# Patient Record
Sex: FEMALE | Race: BLACK OR AFRICAN AMERICAN | NOT HISPANIC OR LATINO | Employment: FULL TIME
[De-identification: names, ages, dates, MRNs, and addresses within clinical notes are randomized per-mention and may not be internally consistent; named-entity substitution may affect disease eponyms.]

---

## 2024-03-01 ENCOUNTER — TRANSCRIBE ORDERS (OUTPATIENT)
Dept: OTHER | Age: 54
End: 2024-03-01

## 2024-03-01 DIAGNOSIS — N87.9 CERVICAL DYSPLASIA: Primary | ICD-10-CM

## 2024-03-01 DIAGNOSIS — N95.0 POST-MENOPAUSAL BLEEDING: ICD-10-CM

## 2024-03-04 ENCOUNTER — PATIENT OUTREACH (OUTPATIENT)
Dept: ONCOLOGY | Facility: CLINIC | Age: 54
End: 2024-03-04
Payer: COMMERCIAL

## 2024-03-04 NOTE — PROGRESS NOTES
"New Patient Hematology / Oncology Nurse Navigator Note     Referral Date: 3/1/24    Referring provider:   Brianna Salgado MD   [address] [address]   [phone] (Work)   [phone] (Fax)     Referring Clinic/Organization: [facility]      Referred to: GynOn    Requested provider (if applicable): First available - did not specify     Evaluation for : Cervical dysplasia, High concern for cervical cancer (3/1 path still pending)     Clinical History (per Nurse review of records provided):    3/1/24 Office Visit with OBGYN:  \"Follow-Up   - High concern for cervical cancer regardless of results of colposcopy. Very limited evaluation. Vagina full of blood after placement of speculum. Biopsies taken from sites that appeared abnormal. Discussed by concern with patient and her daughter. Gyn-onc referral placed. Aware of expected phone call  - Given appearance of cervix, friable tissue. ?necrotic tissue, ppx abx.   - Would not attempt office procedures, 500 ml EBL. Advised holding NSAIDs for 6-12 hours. Silver nitrate and lugols solution used if patient presents with bleeding. Bleeding precautions discussed  - Probed into smoking cessation. States she'll be ready soon, but not now. Counseled on tobacco cessation clinic \"  1/26/24 PAP/HPV-- BOOKMARKED  2/12/24 Pelvic US:  Impression:   1. Abnormal thickness of the endometrial stripe for a postmenopausal patient measuring 9 mm. Correlation with tissue sampling is recommended.   2. Conspicuous hypoechoic thickening of the cervix with hyperemia on color flow Doppler evaluation. Recommend correlation with cervical cancer screening. -- BOOKMARKED      Clinical Assessment / Barriers to Care (Per Nurse):  Pt lives in [place], [place]    Records Location: Care Everywhere     Records Needed:   Records from [facility] per protocol (including 3/1 path which is still in process)    Additional testing needed prior to consult:   Path from 3/1 colpo still " pending    Referral updates and Plan:   Referral received, chart reviewed, scheduling instructions updated, patient contacted and routed to NPS for urgent scheduling.     Mellisa Reddy, BSN, RN, PHN, OCN  Hematology/Oncology Nurse Navigator  [facility]  [phone]

## 2024-03-11 NOTE — TELEPHONE ENCOUNTER
RECORDS STATUS - ALL OTHER DIAGNOSIS      RECORDS RECEIVED FROM: [facility] [place] [place]   DATE RECEIVED: 3/11   NOTES STATUS DETAILS   OFFICE NOTE from referring provider CE - [facility] [place] [place] Dr. Brianna Salgado: 3/1/24   MEDICATION LIST CE [facility] [place] [place]   LABS     PATHOLOGY REPORTS [facility] [place] [place], report in CE, slides requested 3/11  [employer] 3/1/24: [id number]   ANYTHING RELATED TO DIAGNOSIS Epic/CE 24   IMAGING (NEED IMAGES & REPORT)     ULTRASOUND PACS 24: [facility] [place] [place]

## 2024-03-11 NOTE — PROGRESS NOTES
Path finalized showing:  Final Diagnosis:   A. Endometrium, biopsy - Multiple superficial fragments of squamous epithelium with severe   dysplasia. Essentially no endometrial tissue present for evaluation.   B. Cervix, 12 o'clock, biopsy -   High-grade squamous intraepithelial lesion (NAJMA 3, severe   dysplasia). Sampling includes ectocervix and endocervix. Transformation zone identified.   Negative for dysplasia.   C. Cervix, 1 o'clock, biopsy -   Squamous cell carcinoma, at least in situ, extending to the   resection margin. See Comment.   D. Endocervix, curettage -   High-grade squamous intraepithelial lesion (NAJMA 3, severe dysplasia).     Intake team already working to obtain slides per protocol     Mellisa Reddy, KATHERINEN, RN, PHN, OCN  Hematology/Oncology Nurse NavigFormerly [facility], [facility]  [phone]

## 2024-03-13 NOTE — TELEPHONE ENCOUNTER
Action March 13, 2024 3:05 PM    Action Taken Sl[place] [facility] [place] and taken to 5th floor path lab for review.

## 2024-03-13 NOTE — PROGRESS NOTES
Patient called NPS with questions regarding her upcoming appointment.     Returned call. Discussed what to expect at her appointment tomorrow with Dr. Edward. No further questions or concerns.     Mellisa Reddy, BSN, RN, PHN, OCN  Hematology/Oncology Nurse Navigator  [facility]  [phone]

## 2024-03-14 ENCOUNTER — ONCOLOGY VISIT (OUTPATIENT)
Dept: ONCOLOGY | Facility: CLINIC | Age: 54
End: 2024-03-14
Attending: OBSTETRICS & GYNECOLOGY
Payer: COMMERCIAL

## 2024-03-14 ENCOUNTER — PRE VISIT (OUTPATIENT)
Dept: ONCOLOGY | Facility: CLINIC | Age: 54
End: 2024-03-14
Payer: COMMERCIAL

## 2024-03-14 ENCOUNTER — LAB REQUISITION (OUTPATIENT)
Dept: LAB | Facility: CLINIC | Age: 54
End: 2024-03-14
Payer: COMMERCIAL

## 2024-03-14 ENCOUNTER — LAB (OUTPATIENT)
Dept: LAB | Facility: CLINIC | Age: 54
End: 2024-03-14
Attending: OBSTETRICS & GYNECOLOGY
Payer: COMMERCIAL

## 2024-03-14 VITALS
RESPIRATION RATE: 20 BRPM | TEMPERATURE: 98.3 F | WEIGHT: 186.7 LBS | HEIGHT: 60 IN | BODY MASS INDEX: 36.66 KG/M2 | SYSTOLIC BLOOD PRESSURE: 122 MMHG | DIASTOLIC BLOOD PRESSURE: 85 MMHG | OXYGEN SATURATION: 99 % | HEART RATE: 103 BPM

## 2024-03-14 DIAGNOSIS — N95.0 POST-MENOPAUSAL BLEEDING: ICD-10-CM

## 2024-03-14 DIAGNOSIS — C53.8 MALIGNANT NEOPLASM OF OVERLAPPING SITES OF CERVIX (H): ICD-10-CM

## 2024-03-14 DIAGNOSIS — N87.9 CERVICAL DYSPLASIA: ICD-10-CM

## 2024-03-14 DIAGNOSIS — C53.1 MALIGNANT NEOPLASM OF EXOCERVIX (H): ICD-10-CM

## 2024-03-14 DIAGNOSIS — C53.1 MALIGNANT NEOPLASM OF EXOCERVIX (H): Primary | ICD-10-CM

## 2024-03-14 LAB
ALBUMIN SERPL BCG-MCNC: 3.6 G/DL (ref 3.5–5.2)
ALP SERPL-CCNC: 108 U/L (ref 40–150)
ALT SERPL W P-5'-P-CCNC: 19 U/L (ref 0–50)
ANION GAP SERPL CALCULATED.3IONS-SCNC: 10 MMOL/L (ref 7–15)
AST SERPL W P-5'-P-CCNC: 34 U/L (ref 0–45)
BASOPHILS # BLD AUTO: 0.1 10E3/UL (ref 0–0.2)
BASOPHILS NFR BLD AUTO: 1 %
BILIRUB SERPL-MCNC: 0.4 MG/DL
BUN SERPL-MCNC: 7.7 MG/DL (ref 6–20)
CALCIUM SERPL-MCNC: 9.3 MG/DL (ref 8.6–10)
CHLORIDE SERPL-SCNC: 98 MMOL/L (ref 98–107)
CREAT SERPL-MCNC: 0.62 MG/DL (ref 0.51–0.95)
DEPRECATED HCO3 PLAS-SCNC: 31 MMOL/L (ref 22–29)
EGFRCR SERPLBLD CKD-EPI 2021: >90 ML/MIN/1.73M2
EOSINOPHIL # BLD AUTO: 0.4 10E3/UL (ref 0–0.7)
EOSINOPHIL NFR BLD AUTO: 6 %
ERYTHROCYTE [DISTWIDTH] IN BLOOD BY AUTOMATED COUNT: 19.6 % (ref 10–15)
GLUCOSE SERPL-MCNC: 108 MG/DL (ref 70–99)
HCT VFR BLD AUTO: 35.5 % (ref 35–47)
HGB BLD-MCNC: 11.5 G/DL (ref 11.7–15.7)
IMM GRANULOCYTES # BLD: 0 10E3/UL
IMM GRANULOCYTES NFR BLD: 0 %
LYMPHOCYTES # BLD AUTO: 1.9 10E3/UL (ref 0.8–5.3)
LYMPHOCYTES NFR BLD AUTO: 28 %
MCH RBC QN AUTO: 29.2 PG (ref 26.5–33)
MCHC RBC AUTO-ENTMCNC: 32.4 G/DL (ref 31.5–36.5)
MCV RBC AUTO: 90 FL (ref 78–100)
MONOCYTES # BLD AUTO: 0.5 10E3/UL (ref 0–1.3)
MONOCYTES NFR BLD AUTO: 7 %
NEUTROPHILS # BLD AUTO: 3.9 10E3/UL (ref 1.6–8.3)
NEUTROPHILS NFR BLD AUTO: 58 %
NRBC # BLD AUTO: 0 10E3/UL
NRBC BLD AUTO-RTO: 0 /100
PLATELET # BLD AUTO: 309 10E3/UL (ref 150–450)
POTASSIUM SERPL-SCNC: 2.9 MMOL/L (ref 3.4–5.3)
PROT SERPL-MCNC: 7.8 G/DL (ref 6.4–8.3)
RBC # BLD AUTO: 3.94 10E6/UL (ref 3.8–5.2)
SODIUM SERPL-SCNC: 139 MMOL/L (ref 135–145)
WBC # BLD AUTO: 6.7 10E3/UL (ref 4–11)

## 2024-03-14 PROCEDURE — 88321 CONSLTJ&REPRT SLD PREP ELSWR: CPT | Performed by: PATHOLOGY

## 2024-03-14 PROCEDURE — 99205 OFFICE O/P NEW HI 60 MIN: CPT | Performed by: OBSTETRICS & GYNECOLOGY

## 2024-03-14 PROCEDURE — 36415 COLL VENOUS BLD VENIPUNCTURE: CPT | Performed by: PATHOLOGY

## 2024-03-14 PROCEDURE — 80053 COMPREHEN METABOLIC PANEL: CPT | Performed by: PATHOLOGY

## 2024-03-14 PROCEDURE — 99213 OFFICE O/P EST LOW 20 MIN: CPT | Performed by: OBSTETRICS & GYNECOLOGY

## 2024-03-14 PROCEDURE — 85025 COMPLETE CBC W/AUTO DIFF WBC: CPT | Performed by: PATHOLOGY

## 2024-03-14 RX ORDER — ERGOCALCIFEROL 1.25 MG/1
CAPSULE, LIQUID FILLED ORAL
COMMUNITY

## 2024-03-14 RX ORDER — NAPROXEN 500 MG/1
TABLET ORAL
COMMUNITY
Start: 2022-10-06 | End: 2024-05-09

## 2024-03-14 RX ORDER — CHLORTHALIDONE 25 MG/1
TABLET ORAL
COMMUNITY
Start: 2024-01-23 | End: 2024-05-09

## 2024-03-14 ASSESSMENT — PAIN SCALES - GENERAL: PAINLEVEL: MODERATE PAIN (4)

## 2024-03-14 NOTE — NURSING NOTE
"Oncology Rooming Note    March 14, 2024 1:41 PM   Michelle Mills is a 53 year old female who presents for:    Chief Complaint   Patient presents with    Oncology Clinic Visit     Cervical dysplasia     Initial Vitals: /85 (BP Location: Right arm, Patient Position: Sitting, Cuff Size: Adult Large)   Pulse 103   Temp 98.3  F (36.8  C) (Oral)   Resp 20   Ht 1.52 m (4' 11.84\")   Wt 84.7 kg (186 lb 11.2 oz)   SpO2 99%   BMI 36.65 kg/m   Estimated body mass index is 36.65 kg/m  as calculated from the following:    Height as of this encounter: 1.52 m (4' 11.84\").    Weight as of this encounter: 84.7 kg (186 lb 11.2 oz). Body surface area is 1.89 meters squared.  Moderate Pain (4) Comment: Data Unavailable   No LMP recorded. Patient is postmenopausal.  Allergies reviewed: Yes  Medications reviewed: Yes    Medications: Medication refills not needed today.  [facility] [employer]  [address]    Frailty Screening:   Is the patient here for a new oncology consult visit in cancer care? 1. Yes. Over the past month, have you experienced difficulty or required a caregiver to assist with:   1. Balance, walking or general mobility (including any falls)? YES-arthritis in legs; otherwise no difficulty   2. Completion of self-care tasks such as bathing, dressing, toileting, grooming/hygiene?  NO  3. Concentration or memory that affects your daily life?  NO       Clinical concerns: none    Domonique Gonzalez"

## 2024-03-14 NOTE — LETTER
3/14/2024         RE: Michelle Mills  [address] [facility] [address]        Dear Colleague,    Thank you for referring your patient, Michelle Mills, to the [facility]. Please see a copy of my visit note below.    GYNECOLOGIC  ONCOLOGY CONSULT    Referring provider:    Brianna Salgado MD  [address]  [place] [address]   RE: Michelle Mills  : 1970  HELGA: 3/14/2024    CC: Squamous Cell Carcinoma of Cervix    HPI: Ms Michelle Mills is a 53 year old female who presents for consultation regarding new diagnosis of cervical cancer.    She presents with her daughter today. She works as  and is able to attend appointments in the afternoon. Reports irregular vaginal bleeding for about 6 months or maybe longer, she thinks she went through menopause about 3 years ago. She also noted increasing vaginal discharge. Reports more fatigue. She was treated for vaginal bacterial infection but the discharge continues to be a problem. She has significant vaginal bleeding during the recent cervical biopsy and she is concerned that a pelvic exam will trigger another bleeding episode. Minimal pelvic pain, no back pain or lower extremity edema or pain.    She reported these symptoms to her primary care routine visit on 24 and was then referred [place] [facility] [place] [facility].      Work up to date  24 noted a friable cervix  Pap: ASC-US, hrHPV 16 positive    24 Pelvic US - endometrial stripe 9 mm    3/1/24 Colposcopy - anterior cervix enlarged, cx biopsy taken and endometrial biopsy  Pathology cervix at 1 o'clock squamous cell carcinoma extending to resection margin  ECC: High grade squamous intraepithelial lesion  EMBx: fragments of HSIL    OBGYN history and Health Maintenance:    Last Pap Smear: see above  Last Mammogram: never  Last Colonoscopy:  never         Past Medical History:   Diagnosis Date     HTN (hypertension)        No past surgical history on  "file.       Current Outpatient Medications   Medication Sig Dispense Refill     chlorthalidone (HYGROTON) 25 MG tablet 1 tablet in the morning with food Orally       naproxen (NAPROSYN) 500 MG tablet 1 tablet with food or milk as needed, do not take along with aleve, aspirin, ibuprofen or motrin Orally every 12 hrs for 15 days       vitamin D2 (ERGOCALCIFEROL) 43657 units (1250 mcg) capsule 1 capsule with meals Orally Once weekly for 90 days           Allergies   Allergen Reactions     Codeine      Other Reaction(s): Unknown       Social History:  Social History     Tobacco Use     Smoking status: Every Day     Types: Cigarettes     Smokeless tobacco: Never   Substance Use Topics     Alcohol use: Not on file       Family History:   The patient's family history is notable for no breast or ovarian cancer.  No family history on file.      Physical Exam:     /85 (BP Location: Right arm, Patient Position: Sitting, Cuff Size: Adult Large)   Pulse 103   Temp 98.3  F (36.8  C) (Oral)   Resp 20   Ht 1.52 m (4' 11.84\")   Wt 84.7 kg (186 lb 11.2 oz)   SpO2 99%   BMI 36.65 kg/m    Body mass index is 36.65 kg/m .    General: Alert and oriented, no acute distress  Psych: Mood stable  Cardiovascular: RRR, no murmors  Pulmonary: Lungs clear . Normal respiratory effort  GI: No distention. No masses. No hernia.   Lymph: No enlarge lymph nodes in neck or groin  : Normal external genitalia. 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     She is a smoker    Recently " established a primary health care    Plan:     1.)    Cervical cancer - I have reviewed recent pathology and today's exam which together support a new diagnosis of cervical cancer. Due to the size of the tumor and location, primary surgical management is not recommended.    We discussed the recommendation would include pelvic radiation therapy which includes cisplatin chemotherapy potentiation. Ahead of the visit will obtain pathology review, PET CT scan for staging and referral to radiation oncology for consult.    Will discuss additional support services to help her with transportation, okay to request social work support       2.) Labs and/or tests ordered include:  CBC, CMP, evaluate renal function and risk of anemia.    3.) Vagina discharge is due to the cancer and will improve once treatment starts     4.) Will discuss smoking cessation program in follow up visits    Katiuska Edward M.D., MPH,  F.A.C.O.G.  Professor  Department of Ob/Gyn and Women's Health  Division of Gynecologic Oncology  [facility]  [phone]       Time: total time spent today, 60 , including preparation, review of outside records, face to face counseling, and documentation.

## 2024-03-14 NOTE — PATIENT INSTRUCTIONS
PET scan and follow up with Dr Edward  Rad/Onc new patient consult    Scheduling will reach out and assist with these appointments

## 2024-03-19 LAB
PATH REPORT.COMMENTS IMP SPEC: ABNORMAL
PATH REPORT.COMMENTS IMP SPEC: YES
PATH REPORT.FINAL DX SPEC: ABNORMAL
PATH REPORT.GROSS SPEC: ABNORMAL
PATH REPORT.RELEVANT HX SPEC: ABNORMAL
PATH REPORT.RELEVANT HX SPEC: ABNORMAL
PATH REPORT.SITE OF ORIGIN SPEC: ABNORMAL

## 2024-03-21 ENCOUNTER — PATIENT OUTREACH (OUTPATIENT)
Dept: ONCOLOGY | Facility: CLINIC | Age: 54
End: 2024-03-21
Payer: COMMERCIAL

## 2024-03-21 DIAGNOSIS — C53.1 MALIGNANT NEOPLASM OF EXOCERVIX (H): Primary | ICD-10-CM

## 2024-03-21 NOTE — PROGRESS NOTES
MD reviewed pathology and discussed this with patient     MD reviewed the plan with patient   PET/MRI/RADOnc appt     MD answered all questions and patient agrees to the plan    Patient appreciative of the call     Xiao Boateng RN

## 2024-03-21 NOTE — TELEPHONE ENCOUNTER
RECORDS STATUS - ALL OTHER DIAGNOSIS      RECORDS RECEIVED FROM: [facility] (Please see pre visit 3/14 - Dr. Edward for Previous records gathering)   DATE RECEIVED: 3/21

## 2024-03-22 ENCOUNTER — ANCILLARY PROCEDURE (OUTPATIENT)
Dept: PET IMAGING | Facility: CLINIC | Age: 54
End: 2024-03-22
Attending: OBSTETRICS & GYNECOLOGY
Payer: COMMERCIAL

## 2024-03-22 DIAGNOSIS — C53.8 MALIGNANT NEOPLASM OF OVERLAPPING SITES OF CERVIX (H): ICD-10-CM

## 2024-03-22 LAB — GLUCOSE SERPL-MCNC: 137 MG/DL (ref 70–99)

## 2024-03-22 RX ORDER — IOPAMIDOL 755 MG/ML
50-125 INJECTION, SOLUTION INTRAVASCULAR ONCE
Status: COMPLETED | OUTPATIENT
Start: 2024-03-22 | End: 2024-03-22

## 2024-03-22 RX ORDER — FUROSEMIDE 10 MG/ML
40 INJECTION INTRAMUSCULAR; INTRAVENOUS ONCE
Status: COMPLETED | OUTPATIENT
Start: 2024-03-22 | End: 2024-03-22

## 2024-03-22 RX ADMIN — FUROSEMIDE 40 MG: 10 INJECTION INTRAMUSCULAR; INTRAVENOUS at 08:01

## 2024-03-22 RX ADMIN — IOPAMIDOL 100 ML: 755 INJECTION, SOLUTION INTRAVASCULAR at 08:01

## 2024-03-22 NOTE — DISCHARGE INSTRUCTIONS

## 2024-03-22 NOTE — PROGRESS NOTES
RADIATION ONCOLOGY CONSULTATION  DATE OF VISIT: Mar 26, 2024    Michelle Mills  MRN: [id number]    PROBLEM:    was seen for initial consultation in the Department of Radiation Oncology on 3/22/2024 at the request of Dr. Edward for cervical cancer      HISTORY OF PRESENT ILLNESS:   53F postmenopausal patient presented for Pap ASCUS with HPV 16 positivity, and postmenopausal bleeding. Her last Pap smear over a decade ago showed no abnormalities, and she has no hx of STDs. The patient has vaginal discharge, daily smoker, consumes alcohol x4 beers weekly, and is on HTN medication.    US showed a 9mm endometrial stripe. Upon examination prior to an attempted colposcopy, the perineal area appeared normal but the anterior cervix was enlarged and vascular, with significant bleeding noted upon speculum insertion, leading to an aborted colposcopy due to poor visualization on 3/1/2024. However, biopsies were obtained from the cervix and endocervical curettage and endometrial biopsy were performed, revealing superficial fragments of squamous cell carcinoma, papillary variant in the endometrium, high-grade squamous intraepithelial lesion (NAJMA-3) with glandular involvement at the 12 o'clock position of the cervix, and squamous cell carcinoma in situ at the 1 o'clock position.    MR of the pelvis and a PET CT ordered, PET-CT performed 3/22/2024, indicated a large cervical mass without denilson involvement or distant metastases, classifying the disease as at least FIGO stage IB3 >4cm.     During the interview, the patient was accompanied to the clinic today by her daughter. She initiated the conversation by expressing hesitancy regarding a repeat speculum exam due to significant bleeding and discomfort from prior exams. Consequently, we communicated with her gynecologic oncologist, Dr. Edward, regarding her previous examination. Dr. Edward noted a 5 cm cervical lesion, involvement of the left parametrium, and no vaginal  involvement. The patient reports that she is currently experiencing daily vaginal discharge, no bleeding in the last 2 weeks except during speculum exams, normal urination without urgency or frequency, and normal bowel movements. She has no history of diabetes or recent diarrhea. She denies experiencing fevers, chills, pelvic pain, or abdominal fullness/discomfort. Despite living 10 to 15 minutes away from this location, she expressed a desire for assistance with transportation to and from appointments due to her reliance on ride-sharing services.    We reviewed her medical history. She noted a significant history of cancer in her extended family but no history of cancer in her immediate family. However, she does have a cousin with cervical cancer and a granddaughter with lymphoma. She has had no prior surgeries except for a tubal ligation. Her obstetric history is G7, P7, with no history of hormone replacement therapy. Additionally, she does not have a pacemaker, has not undergone any prior radiation treatments, and does not have any autoimmune diseases. Currently, she is taking vitamin D and blood pressure medications. She has a history of arthritis in her knees, which is not rheumatoid.    CHEMOTHERAPY HISTORY: No     IMPLANTABLE CARDIAC DEVICE: No     MEDICATIONS:  Current Outpatient Medications   Medication Sig Dispense Refill    chlorthalidone (HYGROTON) 25 MG tablet 1 tablet in the morning with food Orally      naproxen (NAPROSYN) 500 MG tablet 1 tablet with food or milk as needed, do not take along with aleve, aspirin, ibuprofen or motrin Orally every 12 hrs for 15 days      vitamin D2 (ERGOCALCIFEROL) 69647 units (1250 mcg) capsule 1 capsule with meals Orally Once weekly for 90 days          ALLERGIES:   Allergies as of 03/26/2024 - Reviewed 03/14/2024   Allergen Reaction Noted    Codeine  03/14/2024     SOCIAL HISTORY:      Social History     Socioeconomic History    Marital status: Single     Spouse name:  Not on file    Number of children: Not on file    Years of education: Not on file    Highest education level: Not on file   Occupational History    Not on file   Tobacco Use    Smoking status: Every Day     Types: Cigarettes    Smokeless tobacco: Never   Substance and Sexual Activity    Alcohol use: Not on file    Drug use: Not on file    Sexual activity: Not on file   Other Topics Concern    Not on file   Social History Narrative    Not on file     Social Determinants of Health     Financial Resource Strain: Not on file   Food Insecurity: Not on file   Transportation Needs: Not on file   Physical Activity: Not on file   Stress: Not on file   Social Connections: Not on file   Interpersonal Safety: Not on file   Housing Stability: Not on file     FAMILY HISTORY:   No family history on file.    REVIEW OF SYMPTOMS:  A full 12-point review of systems was performed. All pertinent positives and negatives are noted in HPI.    FUNCTIONAL STATUS:  ECO    PHYSICAL EXAMINATION:    Wt 86.6 kg (191 lb)   BMI 37.50 kg/m    Exam:  Constitutional: healthy, alert, and no distress  Cardiovascular: WWP  Respiratory: normal WOB  Gastrointestinal: Abdomen soft, non-tender. BS normal. No masses, organomegaly  : Deferred  Musculoskeletal: extremities normal- no gross deformities noted, gait normal, and normal muscle tone  Skin: no suspicious lesions or rashes  Neurologic: Gait normal. Reflexes normal and symmetric. Sensation grossly WNL.  Psychiatric: mentation appears normal and affect normal/bright    IMAGING/PATH:   Imaging: per HPI, personally reviewed and in agreement    Path: per HPI, personally reviewed and in agreement    Labs: per HPI, personally reviewed and in agreement    ASSESSMENT AND PLAN:   Michelle Mills is a 53 year old woman with new diagnosis of squamous cell carcinoma of the cervix, FIGO IB3 but may be upstaged per MRI, PET-CT without evidence of positive nodes or distant metastases.  Her last gynecologic exam  was notable for left parametrial involvement, no vaginal involvement, 5 cm cervical lesion.  As such, we recommend external beam pelvic radiation to a dose of 45 Gy in 25 treatments (180 cGy/Fx)) to the pelvis and regional nodes with a simultaneous integrated boost of 52 Gray to the left parametria, with concurrent weekly cisplatin to be administered by gynecologic oncology.     The risks, benefits, alternatives, and logistics to radiation therapy were discussed in detail. Side effects of radiation include, but are not limited to, fatigue, skin reaction, drop in blood counts that could lead to infection or bleeding, nausea/vomiting, urinary or bowel symptoms, bowel/bladder obstruction or perforation, lymphedema, vaginal stenosis and dryness. She is aware that the side effects of radiation therapy may be severe and permanent, although we expect that such risks would be outweighed by the benefit of treatment. The patient will require brachytherapy boost following EBRT, interval MRI pelvis will be performed 5 weeks into RT to assess interval response. Further details regarding brachytherapy plan pending MRI pelvis, but we anticipate either tandem and ring brachytherapy versus hybrid applicator.     She was given the opportunity to ask questions, which were answered.    We appreciate the opportunity to participate in Ms. Mills's care.  Please call with questions.    Paola Oneal MD MS PGY2   Discussed with Dr. Henry    Physician Attestation  Ms. Mills was seen and examined by me. I agree with the findings and plan of care as documented in the note. Note above by Dr. Oneal was reviewed and edited by me and reflects our mutual findings and plan of care.    The overall time I spent on direct patient care including self review of records, labs, and radiographic studies, as well as, direct face-to-face interaction with the patient and coordination of care with other providers was 65 minutes.    Dede Henry,  MD  Department of Radiation Oncology  [facility]

## 2024-03-26 ENCOUNTER — PRE VISIT (OUTPATIENT)
Dept: RADIATION ONCOLOGY | Facility: CLINIC | Age: 54
End: 2024-03-26
Payer: COMMERCIAL

## 2024-03-26 ENCOUNTER — OFFICE VISIT (OUTPATIENT)
Dept: RADIATION ONCOLOGY | Facility: CLINIC | Age: 54
End: 2024-03-26
Attending: OBSTETRICS & GYNECOLOGY
Payer: COMMERCIAL

## 2024-03-26 VITALS — WEIGHT: 191 LBS | BODY MASS INDEX: 37.5 KG/M2

## 2024-03-26 DIAGNOSIS — C53.8 MALIGNANT NEOPLASM OF OVERLAPPING SITES OF CERVIX (H): ICD-10-CM

## 2024-03-26 PROCEDURE — 99213 OFFICE O/P EST LOW 20 MIN: CPT | Performed by: RADIOLOGY

## 2024-03-26 PROCEDURE — 99205 OFFICE O/P NEW HI 60 MIN: CPT | Mod: GC | Performed by: RADIOLOGY

## 2024-03-26 ASSESSMENT — ENCOUNTER SYMPTOMS
COUGH: 1
SHORTNESS OF BREATH: 1
CARDIOVASCULAR NEGATIVE: 1
EYES NEGATIVE: 1
GASTROINTESTINAL NEGATIVE: 1
PSYCHIATRIC NEGATIVE: 1
NEUROLOGICAL NEGATIVE: 1

## 2024-03-26 NOTE — PROGRESS NOTES
HPI    INITIAL PATIENT ASSESSMENT    Diagnosis: Cancer    Prior radiation therapy: None    Prior chemotherapy: None    Prior hormonal therapy:No    Pain Eval:  Denies    Psychosocial  Living arrangements: Lives with family  Fall Risk: independent   referral needs: Yes: Referral placed    Advanced Directive: No  Implantable Cardiac Device? No    Onset of menarche: 11  LMP: No LMP recorded. Patient is postmenopausal.  Onset of menopause: Now  Abnormal vaginal bleeding/discharge: No      Review of Systems   Constitutional:  Positive for malaise/fatigue.   HENT: Negative.     Eyes: Negative.    Respiratory:  Positive for cough and shortness of breath.    Cardiovascular: Negative.    Gastrointestinal: Negative.    Genitourinary: Negative.    Musculoskeletal:  Positive for joint pain.        Arthritidis   Skin: Negative.    Neurological: Negative.    Endo/Heme/Allergies: Negative.    Psychiatric/Behavioral: Negative.

## 2024-03-26 NOTE — LETTER
3/26/2024         RE: Michelle Mills  [address]  [facility] [address]        Dear Colleague,    Thank you for referring your patient, Michelle Mills, to the [facility] RADIATION ONCOLOGY. Please see a copy of my visit note below.    RADIATION ONCOLOGY CONSULTATION  DATE OF VISIT: Mar 26, 2024    Michelle Mills  MRN: [id number]    PROBLEM:    was seen for initial consultation in the Department of Radiation Oncology on 3/22/2024 at the request of Dr. Edward for cervical cancer      HISTORY OF PRESENT ILLNESS:   53F postmenopausal patient presented for Pap ASCUS with HPV 16 positivity, and postmenopausal bleeding. Her last Pap smear over a decade ago showed no abnormalities, and she has no hx of STDs. The patient has vaginal discharge, daily smoker, consumes alcohol x4 beers weekly, and is on HTN medication.    US showed a 9mm endometrial stripe. Upon examination prior to an attempted colposcopy, the perineal area appeared normal but the anterior cervix was enlarged and vascular, with significant bleeding noted upon speculum insertion, leading to an aborted colposcopy due to poor visualization on 3/1/2024. However, biopsies were obtained from the cervix and endocervical curettage and endometrial biopsy were performed, revealing superficial fragments of squamous cell carcinoma, papillary variant in the endometrium, high-grade squamous intraepithelial lesion (NAJMA-3) with glandular involvement at the 12 o'clock position of the cervix, and squamous cell carcinoma in situ at the 1 o'clock position.    MR of the pelvis and a PET CT ordered, PET-CT performed 3/22/2024, indicated a large cervical mass without denilson involvement or distant metastases, classifying the disease as at least FIGO stage IB3 >4cm.     During the interview, the patient was accompanied to the clinic today by her daughter. She initiated the conversation by expressing hesitancy regarding a repeat speculum exam due to  significant bleeding and discomfort from prior exams. Consequently, we communicated with her gynecologic oncologist, Dr. Edward, regarding her previous examination. Dr. Edward noted a 5 cm cervical lesion, involvement of the left parametrium, and no vaginal involvement. The patient reports that she is currently experiencing daily vaginal discharge, no bleeding in the last 2 weeks except during speculum exams, normal urination without urgency or frequency, and normal bowel movements. She has no history of diabetes or recent diarrhea. She denies experiencing fevers, chills, pelvic pain, or abdominal fullness/discomfort. Despite living 10 to 15 minutes away from this location, she expressed a desire for assistance with transportation to and from appointments due to her reliance on ride-sharing services.    We reviewed her medical history. She noted a significant history of cancer in her extended family but no history of cancer in her immediate family. However, she does have a cousin with cervical cancer and a granddaughter with lymphoma. She has had no prior surgeries except for a tubal ligation. Her obstetric history is G7, P7, with no history of hormone replacement therapy. Additionally, she does not have a pacemaker, has not undergone any prior radiation treatments, and does not have any autoimmune diseases. Currently, she is taking vitamin D and blood pressure medications. She has a history of arthritis in her knees, which is not rheumatoid.    CHEMOTHERAPY HISTORY: No     IMPLANTABLE CARDIAC DEVICE: No     MEDICATIONS:  Current Outpatient Medications   Medication Sig Dispense Refill    chlorthalidone (HYGROTON) 25 MG tablet 1 tablet in the morning with food Orally      naproxen (NAPROSYN) 500 MG tablet 1 tablet with food or milk as needed, do not take along with aleve, aspirin, ibuprofen or motrin Orally every 12 hrs for 15 days      vitamin D2 (ERGOCALCIFEROL) 32305 units (1250 mcg) capsule 1 capsule with meals  Orally Once weekly for 90 days          ALLERGIES:   Allergies as of 2024 - Reviewed 2024   Allergen Reaction Noted    Codeine  2024     SOCIAL HISTORY:      Social History     Socioeconomic History    Marital status: Single     Spouse name: Not on file    Number of children: Not on file    Years of education: Not on file    Highest education level: Not on file   Occupational History    Not on file   Tobacco Use    Smoking status: Every Day     Types: Cigarettes    Smokeless tobacco: Never   Substance and Sexual Activity    Alcohol use: Not on file    Drug use: Not on file    Sexual activity: Not on file   Other Topics Concern    Not on file   Social History Narrative    Not on file     Social Determinants of Health     Financial Resource Strain: Not on file   Food Insecurity: Not on file   Transportation Needs: Not on file   Physical Activity: Not on file   Stress: Not on file   Social Connections: Not on file   Interpersonal Safety: Not on file   Housing Stability: Not on file     FAMILY HISTORY:   No family history on file.    REVIEW OF SYMPTOMS:  A full 12-point review of systems was performed. All pertinent positives and negatives are noted in HPI.    FUNCTIONAL STATUS:  ECO    PHYSICAL EXAMINATION:    Wt 86.6 kg (191 lb)   BMI 37.50 kg/m    Exam:  Constitutional: healthy, alert, and no distress  Cardiovascular: WWP  Respiratory: normal WOB  Gastrointestinal: Abdomen soft, non-tender. BS normal. No masses, organomegaly  : Deferred  Musculoskeletal: extremities normal- no gross deformities noted, gait normal, and normal muscle tone  Skin: no suspicious lesions or rashes  Neurologic: Gait normal. Reflexes normal and symmetric. Sensation grossly WNL.  Psychiatric: mentation appears normal and affect normal/bright    IMAGING/PATH:   Imaging: per HPI, personally reviewed and in agreement    Path: per HPI, personally reviewed and in agreement    Labs: per HPI, personally reviewed and in  agreement    ASSESSMENT AND PLAN:   Michelle Mills is a 53 year old woman with new diagnosis of cervical cancer, FIGO IB3 but may be upstaged per MRI, PET-CT without evidence of positive nodes or distant metastases.  Her last gynecologic exam was notable for left parametrial involvement, no vaginal involvement, 5 cm cervical lesion.  As such, we recommend external beam pelvic radiation to a dose of 45 Gy in 25 treatments (180 cGy/Fx)) to the pelvis and regional nodes with a simultaneous integrated boost of 52 Gray to the left parametria, with concurrent weekly cisplatin to be administered by gynecologic oncology.     The risks, benefits, alternatives, and logistics to radiation therapy were discussed in detail. Side effects of radiation include, but are not limited to, fatigue, skin reaction, drop in blood counts that could lead to infection or bleeding, nausea/vomiting, urinary or bowel symptoms, bowel/bladder obstruction or perforation, lymphedema, vaginal stenosis and dryness. She is aware that the side effects of radiation therapy may be severe and permanent, although we expect that such risks would be outweighed by the benefit of treatment. The patient will require brachytherapy boost following EBRT, interval MRI pelvis will be performed 5 weeks into RT to assess interval response. Further details regarding brachytherapy plan pending MRI pelvis, but we anticipate either tandem and ring brachytherapy versus hybrid applicator.     She was given the opportunity to ask questions, which were answered.    We appreciate the opportunity to participate in Ms. Mills's care.  Please call with questions.    Paola Oneal MD MS PGY2   Discussed with Dr. Henry    Physician Attestation  Ms. Mills was seen and examined by me. I agree with the findings and plan of care as documented in the note. Note above by Dr. Oneal was reviewed and edited by me and reflects our mutual findings and plan of care.    The overall time I  spent on direct patient care including self review of records, labs, and radiographic studies, as well as, direct face-to-face interaction with the patient and coordination of care with other providers was 65 minutes.    Dede Henry MD  Department of Radiation Oncology  [facility]              HPI    INITIAL PATIENT ASSESSMENT    Diagnosis: Cancer    Prior radiation therapy: None    Prior chemotherapy: None    Prior hormonal therapy:No    Pain Eval:  Denies    Psychosocial  Living arrangements: Lives with family  Fall Risk: independent   referral needs: Yes: Referral placed    Advanced Directive: No  Implantable Cardiac Device? No    Onset of menarche: 11  LMP: No LMP recorded. Patient is postmenopausal.  Onset of menopause: Now  Abnormal vaginal bleeding/discharge: No      Review of Systems   Constitutional:  Positive for malaise/fatigue.   HENT: Negative.     Eyes: Negative.    Respiratory:  Positive for cough and shortness of breath.    Cardiovascular: Negative.    Gastrointestinal: Negative.    Genitourinary: Negative.    Musculoskeletal:  Positive for joint pain.        Arthritidis   Skin: Negative.    Neurological: Negative.    Endo/Heme/Allergies: Negative.    Psychiatric/Behavioral: Negative.       Again, thank you for allowing me to participate in the care of your patient.        Sincerely,        Dede Henry MD

## 2024-03-28 ENCOUNTER — PATIENT OUTREACH (OUTPATIENT)
Dept: CARE COORDINATION | Facility: CLINIC | Age: 54
End: 2024-03-28
Payer: COMMERCIAL

## 2024-03-28 ENCOUNTER — HOSPITAL ENCOUNTER (OUTPATIENT)
Dept: MRI IMAGING | Facility: CLINIC | Age: 54
Discharge: HOME | End: 2024-03-28
Attending: RADIOLOGY | Admitting: RADIOLOGY
Payer: COMMERCIAL

## 2024-03-28 DIAGNOSIS — C53.1 MALIGNANT NEOPLASM OF EXOCERVIX (H): ICD-10-CM

## 2024-03-28 PROCEDURE — 255N000002 HC RX 255 OP 636: Performed by: RADIOLOGY

## 2024-03-28 PROCEDURE — 72197 MRI PELVIS W/O & W/DYE: CPT | Mod: 26 | Performed by: STUDENT IN AN ORGANIZED HEALTH CARE EDUCATION/TRAINING PROGRAM

## 2024-03-28 PROCEDURE — 72197 MRI PELVIS W/O & W/DYE: CPT

## 2024-03-28 PROCEDURE — A9585 GADOBUTROL INJECTION: HCPCS | Performed by: RADIOLOGY

## 2024-03-28 RX ORDER — GADOBUTROL 604.72 MG/ML
10 INJECTION INTRAVENOUS ONCE
Status: COMPLETED | OUTPATIENT
Start: 2024-03-28 | End: 2024-03-28

## 2024-03-28 RX ADMIN — GADOBUTROL 10 ML: 604.72 INJECTION INTRAVENOUS at 13:09

## 2024-03-28 NOTE — PROGRESS NOTES
Social Work - Telephone/Scienionhart message  [facility]  Data:   Patient Name: Michelle Mills  Goes By: Michelle    /Age: 1970 (53 year old)      Referral Source: [facility]     Reason for Referral: Transportation    Intervention: Left voice message for patient on 2024 .   Plan:  will await patient's return phone call/message and provide assistance at that time.      CARLITO Bianchi,SW  Hematology/Oncology Social Worker  Phone:[phone] Pager: [phone]

## 2024-04-03 NOTE — PROGRESS NOTES
GYNECOLOGIC  ONCOLOGY CONSULT    Referring provider:    Brianna Salgado MD  [address] [address]   RE: Michelle Mills  : 1970  HELGA: 3/14/2024    CC: Squamous Cell Carcinoma of Cervix    HPI: Ms Michelle Mills is a 53 year old female who presents for consultation regarding new diagnosis of cervical cancer.    She presents with her daughter today. She works as  and is able to attend appointments in the afternoon. Reports irregular vaginal bleeding for about 6 months or maybe longer, she thinks she went through menopause about 3 years ago. She also noted increasing vaginal discharge. Reports more fatigue. She was treated for vaginal bacterial infection but the discharge continues to be a problem. She has significant vaginal bleeding during the recent cervical biopsy and she is concerned that a pelvic exam will trigger another bleeding episode. Minimal pelvic pain, no back pain or lower extremity edema or pain.    She reported these symptoms to her primary care routine visit on 24 and was then [place] [facility]      Work up to date  24 noted a friable cervix  Pap: ASC-US, hrHPV 16 positive    24 Pelvic US - endometrial stripe 9 mm    3/1/24 Colposcopy - anterior cervix enlarged, cx biopsy taken and endometrial biopsy  Pathology cervix at 1 o'clock squamous cell carcinoma extending to resection margin  ECC: High grade squamous intraepithelial lesion  EMBx: fragments of HSIL    OBGYN history and Health Maintenance:    Last Pap Smear: see above  Last Mammogram: never  Last Colonoscopy:  never         Past Medical History:   Diagnosis Date    HTN (hypertension)        No past surgical history on file.       Current Outpatient Medications   Medication Sig Dispense Refill    chlorthalidone (HYGROTON) 25 MG tablet 1 tablet in the morning with food Orally      naproxen (NAPROSYN) 500 MG tablet 1 tablet with food or milk as needed, do not take along with  "aleve, aspirin, ibuprofen or motrin Orally every 12 hrs for 15 days      vitamin D2 (ERGOCALCIFEROL) 85398 units (1250 mcg) capsule 1 capsule with meals Orally Once weekly for 90 days           Allergies   Allergen Reactions    Codeine      Other Reaction(s): Unknown       Social History:  Social History     Tobacco Use    Smoking status: Every Day     Types: Cigarettes    Smokeless tobacco: Never   Substance Use Topics    Alcohol use: Not on file       Family History:   The patient's family history is notable for no breast or ovarian cancer.  No family history on file.      Physical Exam:     /85 (BP Location: Right arm, Patient Position: Sitting, Cuff Size: Adult Large)   Pulse 103   Temp 98.3  F (36.8  C) (Oral)   Resp 20   Ht 1.52 m (4' 11.84\")   Wt 84.7 kg (186 lb 11.2 oz)   SpO2 99%   BMI 36.65 kg/m    Body mass index is 36.65 kg/m .    General: Alert and oriented, no acute distress  Psych: Mood stable  Cardiovascular: RRR, no murmors  Pulmonary: Lungs clear . Normal respiratory effort  GI: No distention. No masses. No hernia.   Lymph: No enlarge lymph nodes in neck or groin  : Normal external genitalia. Minimal blood in vain, vagina without lesions, Cervix is enlarged with erosive lesion anterior and nodular, lesion appears to be extending into the endocervical canal, on bimanual exam cervix measuring at 6 cm, deviated towards left, with possible left parametrial extension, no pelvic side wall extension  Minimal bleeding during exam, noted malodorous vaginal discharge    Assessment:samantha Mills is a 53 year old woman with a new diagnosis of likely Stage IIB squamous cell carcinoma of the cervix.     She is a smoker    Recently established a primary health care    Plan:     1.)    Cervical cancer - I have reviewed recent pathology and today's exam which together support a new diagnosis of cervical cancer. Due to the size of the tumor and location, primary surgical management is not " recommended.    We discussed the recommendation would include pelvic radiation therapy which includes cisplatin chemotherapy potentiation. Ahead of the visit will obtain pathology review, PET CT scan for staging and referral to radiation oncology for consult.    Will discuss additional support services to help her with transportation, okay to request social work support       2.) Labs and/or tests ordered include:  CBC, CMP, evaluate renal function and risk of anemia.    3.) Vagina discharge is due to the cancer and will improve once treatment starts     4.) Will discuss smoking cessation program in follow up visits    Katiuska Edward M.D., MPH,  F.A.C.O.G.  Professor  Department of Ob/Gyn and Women's Health  Division of Gynecologic Oncology  [facility]  [phone]       Time: total time spent today, 60 , including preparation, review of outside records, face to face counseling, and documentation.

## 2024-04-10 ENCOUNTER — OFFICE VISIT (OUTPATIENT)
Dept: RADIATION ONCOLOGY | Facility: CLINIC | Age: 54
End: 2024-04-10
Attending: RADIOLOGY
Payer: COMMERCIAL

## 2024-04-10 ENCOUNTER — HOSPITAL ENCOUNTER (OUTPATIENT)
Dept: CT IMAGING | Facility: CLINIC | Age: 54
Discharge: HOME | End: 2024-04-10
Attending: RADIOLOGY | Admitting: RADIOLOGY
Payer: COMMERCIAL

## 2024-04-10 ENCOUNTER — OFFICE VISIT (OUTPATIENT)
Dept: RADIATION ONCOLOGY | Facility: CLINIC | Age: 54
End: 2024-04-10
Attending: OBSTETRICS & GYNECOLOGY
Payer: COMMERCIAL

## 2024-04-10 DIAGNOSIS — C53.8 MALIGNANT NEOPLASM OF OVERLAPPING SITES OF CERVIX (H): ICD-10-CM

## 2024-04-10 DIAGNOSIS — C53.1 MALIGNANT NEOPLASM OF EXOCERVIX (H): Primary | ICD-10-CM

## 2024-04-10 PROCEDURE — 77014 CT THERAPY CORRELATE: CPT | Mod: TC

## 2024-04-10 PROCEDURE — 250N000011 HC RX IP 250 OP 636: Performed by: RADIOLOGY

## 2024-04-10 PROCEDURE — 77334 RADIATION TREATMENT AID(S): CPT | Performed by: RADIOLOGY

## 2024-04-10 PROCEDURE — 77334 RADIATION TREATMENT AID(S): CPT | Mod: 26 | Performed by: RADIOLOGY

## 2024-04-10 RX ORDER — IOPAMIDOL 755 MG/ML
118 INJECTION, SOLUTION INTRAVASCULAR ONCE
Status: COMPLETED | OUTPATIENT
Start: 2024-04-10 | End: 2024-04-10

## 2024-04-10 RX ADMIN — IOPAMIDOL 118 ML: 755 INJECTION, SOLUTION INTRAVENOUS at 15:10

## 2024-04-10 NOTE — LETTER
4/10/2024         RE: Michelle Mills  [address]  [facility] [address]        Dear Colleague,    Thank you for referring your patient, Michelle Mills, to the [facility] RADIATION ONCOLOGY. Please see a copy of my visit note below.    A radiation therapy treatment planning simulation was performed.  Please see the [facility] [employer] record for documentation.    Dede Henry MD  Radiation Oncology       Again, thank you for allowing me to participate in the care of your patient.        Sincerely,        Dede Henry MD

## 2024-04-10 NOTE — LETTER
4/10/2024         RE: Michelle Mills  [address]  [facility] [address]        Dear Colleague,    Thank you for referring your patient, Michelle Mills, to the [facility] RADIATION ONCOLOGY. Please see a copy of my visit note below.    A radiation therapy treatment planning simulation was performed.  Please see the [facility] [employer] record for documentation.     Dede Henry MD  Radiation Oncology         Again, thank you for allowing me to participate in the care of your patient.        Sincerely,        Dede Henry MD

## 2024-04-10 NOTE — PROGRESS NOTES
A radiation therapy treatment planning simulation was performed.  Please see the [facility] record for documentation.     Dede Henry MD  Radiation Oncology

## 2024-04-10 NOTE — PROGRESS NOTES
A radiation therapy treatment planning simulation was performed.  Please see the [employer] [employer] record for documentation.    Dede Henry MD  Radiation Oncology

## 2024-04-11 ENCOUNTER — PREP FOR PROCEDURE (OUTPATIENT)
Dept: RADIATION ONCOLOGY | Facility: CLINIC | Age: 54
End: 2024-04-11

## 2024-04-11 ENCOUNTER — PATIENT OUTREACH (OUTPATIENT)
Dept: ONCOLOGY | Facility: CLINIC | Age: 54
End: 2024-04-11
Payer: COMMERCIAL

## 2024-04-11 ENCOUNTER — ONCOLOGY VISIT (OUTPATIENT)
Dept: ONCOLOGY | Facility: CLINIC | Age: 54
End: 2024-04-11
Attending: OBSTETRICS & GYNECOLOGY
Payer: COMMERCIAL

## 2024-04-11 VITALS
SYSTOLIC BLOOD PRESSURE: 120 MMHG | RESPIRATION RATE: 18 BRPM | DIASTOLIC BLOOD PRESSURE: 79 MMHG | WEIGHT: 192 LBS | OXYGEN SATURATION: 98 % | TEMPERATURE: 98.1 F | BODY MASS INDEX: 37.69 KG/M2 | HEART RATE: 99 BPM

## 2024-04-11 DIAGNOSIS — Z51.11 ENCOUNTER FOR ANTINEOPLASTIC CHEMOTHERAPY: ICD-10-CM

## 2024-04-11 DIAGNOSIS — C53.1 MALIGNANT NEOPLASM OF EXOCERVIX (H): Primary | ICD-10-CM

## 2024-04-11 DIAGNOSIS — C53.8 MALIGNANT NEOPLASM OF OVERLAPPING SITES OF CERVIX (H): Primary | ICD-10-CM

## 2024-04-11 DIAGNOSIS — I10 BENIGN ESSENTIAL HYPERTENSION: ICD-10-CM

## 2024-04-11 PROCEDURE — 99213 OFFICE O/P EST LOW 20 MIN: CPT | Performed by: OBSTETRICS & GYNECOLOGY

## 2024-04-11 PROCEDURE — 99214 OFFICE O/P EST MOD 30 MIN: CPT | Performed by: OBSTETRICS & GYNECOLOGY

## 2024-04-11 RX ORDER — HEPARIN SODIUM,PORCINE 10 UNIT/ML
5-20 VIAL (ML) INTRAVENOUS DAILY PRN
Status: CANCELLED | OUTPATIENT
Start: 2024-05-20

## 2024-04-11 RX ORDER — EPINEPHRINE 1 MG/ML
0.3 INJECTION, SOLUTION INTRAMUSCULAR; SUBCUTANEOUS EVERY 5 MIN PRN
OUTPATIENT
Start: 2024-05-27

## 2024-04-11 RX ORDER — DIPHENHYDRAMINE HYDROCHLORIDE 50 MG/ML
50 INJECTION INTRAMUSCULAR; INTRAVENOUS
Status: CANCELLED
Start: 2024-05-06

## 2024-04-11 RX ORDER — METHYLPREDNISOLONE SODIUM SUCCINATE 125 MG/2ML
125 INJECTION, POWDER, LYOPHILIZED, FOR SOLUTION INTRAMUSCULAR; INTRAVENOUS
Status: CANCELLED
Start: 2024-05-13

## 2024-04-11 RX ORDER — DIPHENHYDRAMINE HYDROCHLORIDE 50 MG/ML
50 INJECTION INTRAMUSCULAR; INTRAVENOUS
Status: CANCELLED
Start: 2024-04-29

## 2024-04-11 RX ORDER — ALBUTEROL SULFATE 90 UG/1
1-2 AEROSOL, METERED RESPIRATORY (INHALATION)
Start: 2024-05-27

## 2024-04-11 RX ORDER — LORAZEPAM 2 MG/ML
0.5 INJECTION INTRAMUSCULAR EVERY 4 HOURS PRN
OUTPATIENT
Start: 2024-05-27

## 2024-04-11 RX ORDER — METHYLPREDNISOLONE SODIUM SUCCINATE 125 MG/2ML
125 INJECTION, POWDER, LYOPHILIZED, FOR SOLUTION INTRAMUSCULAR; INTRAVENOUS
Status: CANCELLED
Start: 2024-04-29

## 2024-04-11 RX ORDER — EPINEPHRINE 1 MG/ML
0.3 INJECTION, SOLUTION INTRAMUSCULAR; SUBCUTANEOUS EVERY 5 MIN PRN
Status: CANCELLED | OUTPATIENT
Start: 2024-05-13

## 2024-04-11 RX ORDER — DEXTROSE, SODIUM CHLORIDE, AND POTASSIUM CHLORIDE 5; .45; .15 G/100ML; G/100ML; G/100ML
2000 INJECTION INTRAVENOUS ONCE
Start: 2024-05-27 | End: 2024-05-27

## 2024-04-11 RX ORDER — LORAZEPAM 2 MG/ML
0.5 INJECTION INTRAMUSCULAR EVERY 4 HOURS PRN
Status: CANCELLED | OUTPATIENT
Start: 2024-04-22

## 2024-04-11 RX ORDER — LORAZEPAM 2 MG/ML
0.5 INJECTION INTRAMUSCULAR EVERY 4 HOURS PRN
Status: CANCELLED | OUTPATIENT
Start: 2024-04-29

## 2024-04-11 RX ORDER — DEXTROSE, SODIUM CHLORIDE, AND POTASSIUM CHLORIDE 5; .45; .15 G/100ML; G/100ML; G/100ML
2000 INJECTION INTRAVENOUS ONCE
Status: CANCELLED
Start: 2024-04-22 | End: 2024-04-22

## 2024-04-11 RX ORDER — HEPARIN SODIUM,PORCINE 10 UNIT/ML
5-20 VIAL (ML) INTRAVENOUS DAILY PRN
Status: CANCELLED | OUTPATIENT
Start: 2024-04-29

## 2024-04-11 RX ORDER — METHYLPREDNISOLONE SODIUM SUCCINATE 125 MG/2ML
125 INJECTION, POWDER, LYOPHILIZED, FOR SOLUTION INTRAMUSCULAR; INTRAVENOUS
Status: CANCELLED
Start: 2024-05-20

## 2024-04-11 RX ORDER — ALBUTEROL SULFATE 0.83 MG/ML
2.5 SOLUTION RESPIRATORY (INHALATION)
Status: CANCELLED | OUTPATIENT
Start: 2024-05-06

## 2024-04-11 RX ORDER — LORAZEPAM 2 MG/ML
0.5 INJECTION INTRAMUSCULAR EVERY 4 HOURS PRN
Status: CANCELLED | OUTPATIENT
Start: 2024-05-20

## 2024-04-11 RX ORDER — PALONOSETRON 0.05 MG/ML
0.25 INJECTION, SOLUTION INTRAVENOUS ONCE
Status: CANCELLED | OUTPATIENT
Start: 2024-05-06

## 2024-04-11 RX ORDER — HEPARIN SODIUM (PORCINE) LOCK FLUSH IV SOLN 100 UNIT/ML 100 UNIT/ML
5 SOLUTION INTRAVENOUS
Status: CANCELLED | OUTPATIENT
Start: 2024-05-13

## 2024-04-11 RX ORDER — DEXTROSE, SODIUM CHLORIDE, AND POTASSIUM CHLORIDE 5; .45; .15 G/100ML; G/100ML; G/100ML
2000 INJECTION INTRAVENOUS ONCE
Status: CANCELLED
Start: 2024-05-06 | End: 2024-05-06

## 2024-04-11 RX ORDER — LORAZEPAM 2 MG/ML
0.5 INJECTION INTRAMUSCULAR EVERY 4 HOURS PRN
Status: CANCELLED | OUTPATIENT
Start: 2024-05-13

## 2024-04-11 RX ORDER — HEPARIN SODIUM,PORCINE 10 UNIT/ML
5-20 VIAL (ML) INTRAVENOUS DAILY PRN
OUTPATIENT
Start: 2024-05-27

## 2024-04-11 RX ORDER — DEXTROSE, SODIUM CHLORIDE, AND POTASSIUM CHLORIDE 5; .45; .15 G/100ML; G/100ML; G/100ML
2000 INJECTION INTRAVENOUS ONCE
Status: CANCELLED
Start: 2024-04-29 | End: 2024-04-29

## 2024-04-11 RX ORDER — DIPHENHYDRAMINE HYDROCHLORIDE 50 MG/ML
50 INJECTION INTRAMUSCULAR; INTRAVENOUS
Status: CANCELLED
Start: 2024-05-20

## 2024-04-11 RX ORDER — HEPARIN SODIUM (PORCINE) LOCK FLUSH IV SOLN 100 UNIT/ML 100 UNIT/ML
5 SOLUTION INTRAVENOUS
Status: CANCELLED | OUTPATIENT
Start: 2024-05-20

## 2024-04-11 RX ORDER — DIPHENHYDRAMINE HYDROCHLORIDE 50 MG/ML
50 INJECTION INTRAMUSCULAR; INTRAVENOUS
Status: CANCELLED
Start: 2024-05-13

## 2024-04-11 RX ORDER — ALBUTEROL SULFATE 90 UG/1
1-2 AEROSOL, METERED RESPIRATORY (INHALATION)
Status: CANCELLED
Start: 2024-05-13

## 2024-04-11 RX ORDER — PALONOSETRON 0.05 MG/ML
0.25 INJECTION, SOLUTION INTRAVENOUS ONCE
Status: CANCELLED | OUTPATIENT
Start: 2024-04-29

## 2024-04-11 RX ORDER — METHYLPREDNISOLONE SODIUM SUCCINATE 125 MG/2ML
125 INJECTION, POWDER, LYOPHILIZED, FOR SOLUTION INTRAMUSCULAR; INTRAVENOUS
Status: CANCELLED
Start: 2024-04-22

## 2024-04-11 RX ORDER — MEPERIDINE HYDROCHLORIDE 25 MG/ML
25 INJECTION INTRAMUSCULAR; INTRAVENOUS; SUBCUTANEOUS EVERY 30 MIN PRN
OUTPATIENT
Start: 2024-05-27

## 2024-04-11 RX ORDER — METHYLPREDNISOLONE SODIUM SUCCINATE 125 MG/2ML
125 INJECTION, POWDER, LYOPHILIZED, FOR SOLUTION INTRAMUSCULAR; INTRAVENOUS
Start: 2024-05-27

## 2024-04-11 RX ORDER — MEPERIDINE HYDROCHLORIDE 25 MG/ML
25 INJECTION INTRAMUSCULAR; INTRAVENOUS; SUBCUTANEOUS EVERY 30 MIN PRN
Status: CANCELLED | OUTPATIENT
Start: 2024-04-29

## 2024-04-11 RX ORDER — HEPARIN SODIUM (PORCINE) LOCK FLUSH IV SOLN 100 UNIT/ML 100 UNIT/ML
5 SOLUTION INTRAVENOUS
Status: CANCELLED | OUTPATIENT
Start: 2024-04-22

## 2024-04-11 RX ORDER — ALBUTEROL SULFATE 90 UG/1
1-2 AEROSOL, METERED RESPIRATORY (INHALATION)
Status: CANCELLED
Start: 2024-04-29

## 2024-04-11 RX ORDER — DEXTROSE, SODIUM CHLORIDE, AND POTASSIUM CHLORIDE 5; .45; .15 G/100ML; G/100ML; G/100ML
2000 INJECTION INTRAVENOUS ONCE
Status: CANCELLED
Start: 2024-05-20 | End: 2024-05-20

## 2024-04-11 RX ORDER — ALBUTEROL SULFATE 0.83 MG/ML
2.5 SOLUTION RESPIRATORY (INHALATION)
Status: CANCELLED | OUTPATIENT
Start: 2024-04-22

## 2024-04-11 RX ORDER — ALBUTEROL SULFATE 90 UG/1
1-2 AEROSOL, METERED RESPIRATORY (INHALATION)
Status: CANCELLED
Start: 2024-05-06

## 2024-04-11 RX ORDER — DIPHENHYDRAMINE HYDROCHLORIDE 50 MG/ML
50 INJECTION INTRAMUSCULAR; INTRAVENOUS
Status: CANCELLED
Start: 2024-04-22

## 2024-04-11 RX ORDER — EPINEPHRINE 1 MG/ML
0.3 INJECTION, SOLUTION INTRAMUSCULAR; SUBCUTANEOUS EVERY 5 MIN PRN
Status: CANCELLED | OUTPATIENT
Start: 2024-04-29

## 2024-04-11 RX ORDER — PALONOSETRON 0.05 MG/ML
0.25 INJECTION, SOLUTION INTRAVENOUS ONCE
Status: CANCELLED | OUTPATIENT
Start: 2024-04-22

## 2024-04-11 RX ORDER — MEPERIDINE HYDROCHLORIDE 25 MG/ML
25 INJECTION INTRAMUSCULAR; INTRAVENOUS; SUBCUTANEOUS EVERY 30 MIN PRN
Status: CANCELLED | OUTPATIENT
Start: 2024-05-20

## 2024-04-11 RX ORDER — ALBUTEROL SULFATE 90 UG/1
1-2 AEROSOL, METERED RESPIRATORY (INHALATION)
Status: CANCELLED
Start: 2024-04-22

## 2024-04-11 RX ORDER — CHLORTHALIDONE 25 MG/1
25 TABLET ORAL DAILY
Qty: 60 TABLET | Refills: 0 | Status: SHIPPED | OUTPATIENT
Start: 2024-04-11

## 2024-04-11 RX ORDER — ALBUTEROL SULFATE 90 UG/1
1-2 AEROSOL, METERED RESPIRATORY (INHALATION)
Status: CANCELLED
Start: 2024-05-20

## 2024-04-11 RX ORDER — METHYLPREDNISOLONE SODIUM SUCCINATE 125 MG/2ML
125 INJECTION, POWDER, LYOPHILIZED, FOR SOLUTION INTRAMUSCULAR; INTRAVENOUS
Status: CANCELLED
Start: 2024-05-06

## 2024-04-11 RX ORDER — EPINEPHRINE 1 MG/ML
0.3 INJECTION, SOLUTION INTRAMUSCULAR; SUBCUTANEOUS EVERY 5 MIN PRN
Status: CANCELLED | OUTPATIENT
Start: 2024-05-06

## 2024-04-11 RX ORDER — ALBUTEROL SULFATE 0.83 MG/ML
2.5 SOLUTION RESPIRATORY (INHALATION)
OUTPATIENT
Start: 2024-05-27

## 2024-04-11 RX ORDER — MEPERIDINE HYDROCHLORIDE 25 MG/ML
25 INJECTION INTRAMUSCULAR; INTRAVENOUS; SUBCUTANEOUS EVERY 30 MIN PRN
Status: CANCELLED | OUTPATIENT
Start: 2024-04-22

## 2024-04-11 RX ORDER — EPINEPHRINE 1 MG/ML
0.3 INJECTION, SOLUTION INTRAMUSCULAR; SUBCUTANEOUS EVERY 5 MIN PRN
Status: CANCELLED | OUTPATIENT
Start: 2024-05-20

## 2024-04-11 RX ORDER — ALBUTEROL SULFATE 0.83 MG/ML
2.5 SOLUTION RESPIRATORY (INHALATION)
Status: CANCELLED | OUTPATIENT
Start: 2024-04-29

## 2024-04-11 RX ORDER — HEPARIN SODIUM,PORCINE 10 UNIT/ML
5-20 VIAL (ML) INTRAVENOUS DAILY PRN
Status: CANCELLED | OUTPATIENT
Start: 2024-05-13

## 2024-04-11 RX ORDER — HEPARIN SODIUM (PORCINE) LOCK FLUSH IV SOLN 100 UNIT/ML 100 UNIT/ML
5 SOLUTION INTRAVENOUS
OUTPATIENT
Start: 2024-05-27

## 2024-04-11 RX ORDER — HEPARIN SODIUM (PORCINE) LOCK FLUSH IV SOLN 100 UNIT/ML 100 UNIT/ML
5 SOLUTION INTRAVENOUS
Status: CANCELLED | OUTPATIENT
Start: 2024-05-06

## 2024-04-11 RX ORDER — ALBUTEROL SULFATE 0.83 MG/ML
2.5 SOLUTION RESPIRATORY (INHALATION)
Status: CANCELLED | OUTPATIENT
Start: 2024-05-13

## 2024-04-11 RX ORDER — DIPHENHYDRAMINE HYDROCHLORIDE 50 MG/ML
50 INJECTION INTRAMUSCULAR; INTRAVENOUS
Start: 2024-05-27

## 2024-04-11 RX ORDER — MEPERIDINE HYDROCHLORIDE 25 MG/ML
25 INJECTION INTRAMUSCULAR; INTRAVENOUS; SUBCUTANEOUS EVERY 30 MIN PRN
Status: CANCELLED | OUTPATIENT
Start: 2024-05-06

## 2024-04-11 RX ORDER — PALONOSETRON 0.05 MG/ML
0.25 INJECTION, SOLUTION INTRAVENOUS ONCE
Status: CANCELLED | OUTPATIENT
Start: 2024-05-13

## 2024-04-11 RX ORDER — DEXTROSE, SODIUM CHLORIDE, AND POTASSIUM CHLORIDE 5; .45; .15 G/100ML; G/100ML; G/100ML
2000 INJECTION INTRAVENOUS ONCE
Status: CANCELLED
Start: 2024-05-13 | End: 2024-05-13

## 2024-04-11 RX ORDER — EPINEPHRINE 1 MG/ML
0.3 INJECTION, SOLUTION INTRAMUSCULAR; SUBCUTANEOUS EVERY 5 MIN PRN
Status: CANCELLED | OUTPATIENT
Start: 2024-04-22

## 2024-04-11 RX ORDER — ALBUTEROL SULFATE 0.83 MG/ML
2.5 SOLUTION RESPIRATORY (INHALATION)
Status: CANCELLED | OUTPATIENT
Start: 2024-05-20

## 2024-04-11 RX ORDER — HEPARIN SODIUM,PORCINE 10 UNIT/ML
5-20 VIAL (ML) INTRAVENOUS DAILY PRN
Status: CANCELLED | OUTPATIENT
Start: 2024-04-22

## 2024-04-11 RX ORDER — LORAZEPAM 2 MG/ML
0.5 INJECTION INTRAMUSCULAR EVERY 4 HOURS PRN
Status: CANCELLED | OUTPATIENT
Start: 2024-05-06

## 2024-04-11 RX ORDER — PALONOSETRON 0.05 MG/ML
0.25 INJECTION, SOLUTION INTRAVENOUS ONCE
Status: CANCELLED | OUTPATIENT
Start: 2024-05-27

## 2024-04-11 RX ORDER — HEPARIN SODIUM,PORCINE 10 UNIT/ML
5-20 VIAL (ML) INTRAVENOUS DAILY PRN
Status: CANCELLED | OUTPATIENT
Start: 2024-05-06

## 2024-04-11 RX ORDER — MEPERIDINE HYDROCHLORIDE 25 MG/ML
25 INJECTION INTRAMUSCULAR; INTRAVENOUS; SUBCUTANEOUS EVERY 30 MIN PRN
Status: CANCELLED | OUTPATIENT
Start: 2024-05-13

## 2024-04-11 RX ORDER — HEPARIN SODIUM (PORCINE) LOCK FLUSH IV SOLN 100 UNIT/ML 100 UNIT/ML
5 SOLUTION INTRAVENOUS
Status: CANCELLED | OUTPATIENT
Start: 2024-04-29

## 2024-04-11 RX ORDER — PALONOSETRON 0.05 MG/ML
0.25 INJECTION, SOLUTION INTRAVENOUS ONCE
Status: CANCELLED | OUTPATIENT
Start: 2024-05-20

## 2024-04-11 ASSESSMENT — PAIN SCALES - GENERAL: PAINLEVEL: MODERATE PAIN (5)

## 2024-04-11 NOTE — PROGRESS NOTES
GYNECOLOGIC  ONCOLOGY CLINIC NOTE    Referring provider:    Brianna Salgado MD  [address] [address]   RE: Michelle Mills  : 1970  HELGA: 24     CC: Stage IB3 Squamous Cell Carcinoma of Cervix    HPI: Ms Michelle Mills is a 53 year old female who presents for treatment planning for newly diagnosed cervical cancer.    She has completed consult with radiation therapy with scheduled treatment start day of 24. She reports one episode of vaginal bleeding, and ongoing vaginal discharge. Requesting to transfer primary care, today she needs to refill HTN medication.She has cut down on smoking and wants help to quit.     She reported these symptoms to her primary care routine visit on 24 and was then referred to [facility].    Work up to date  24 noted a friable cervix  Pap: ASC-US, hrHPV 16 positive    24 Pelvic US - endometrial stripe 9 mm    3/1/24 Colposcopy - anterior cervix enlarged, cx biopsy taken and endometrial biopsy  Pathology cervix at 1 o'clock squamous cell carcinoma extending to resection margin  ECC:fragments of squamous cell carcinoma, papillary variant  EMBx: special fragment of squamous cell carcinoma    3/22/24 PET CT  CERVIX 7.4 X 4.6 cm, SUV 19.8, right common vivian 1.3 cm x 1.1 cm, low level SUV 3.1 cm,    3/28/24 MRI Pelvic  Large mass confined to the cervix, without extension to parametrial or lower vagina    OBGYN history and Health Maintenance:    Last Pap Smear: see above  Last Mammogram: never  Last Colonoscopy:  never       Past Medical History:   Diagnosis Date    HTN (hypertension)        No past surgical history on file.       Current Outpatient Medications   Medication Sig Dispense Refill    chlorthalidone (HYGROTON) 25 MG tablet 1 tablet in the morning with food Orally      naproxen (NAPROSYN) 500 MG tablet 1 tablet with food or milk as needed, do not take along with aleve, aspirin, ibuprofen or motrin Orally every 12 hrs for 15  days      vitamin D2 (ERGOCALCIFEROL) 68134 units (1250 mcg) capsule 1 capsule with meals Orally Once weekly for 90 days           Allergies   Allergen Reactions    Codeine      Other Reaction(s): Unknown       Social History:  Social History     Tobacco Use    Smoking status: Every Day     Types: Cigarettes    Smokeless tobacco: Never   Substance Use Topics    Alcohol use: Not on file       Family History:   The patient's family history is notable for no breast or ovarian cancer.  No family history on file.      Physical Exam:     /79   Pulse 99   Temp 98.1  F (36.7  C) (Oral)   Resp 18   Wt 87.1 kg (192 lb)   SpO2 98%   BMI 37.69 kg/m    Body mass index is 37.69 kg/m .    General: Alert and oriented, no acute distress  Psych: Mood stable  Cardiovascular: RRR, no murmors  Pulmonary: Lungs clear . Normal respiratory effort    Assessment:samantha Mills is a 53 year old woman with a new diagnosis of likely Stage IB3 squamous cell carcinoma of the cervix.     She is a smoker, trying to quit, wants to discuss this with PCP     HTN    Plan:     1.)    Cervical cancer -  reviewed ongoing plan, to start chemoradiation therapy on 4/22/24. Reviewed the role of Cisplatin at 40 mg/m2 every week during the course of radiation therapy.  Benefits and side effects of treatment reviewed. She agrees to chemotherapy.    2.) Vagina discharge is due to the cancer and will improve once treatment starts     3.) Smoking cessation - plans to discuss with PCP, referral sent    4.)  HTN - refilled medication for 2 months until she establishes primary care clinic      Katiuska Edward M.D., MPH,  F.A.C.O.G.  Professor  Department of Ob/Gyn and Women's Health  Division of Gynecologic Oncology  [facility]  [phone]     Time: total time spent today, 25 , including preparation, review of outside records, face to face counseling, and documentation.

## 2024-04-11 NOTE — PROGRESS NOTES
[facility]: Cancer Care Initial Note                                    Discussion with Patient:                                                      Cisplatin Education            Assessment:                                                      Initial  Patient will have a virtual visit with NP prior to starting Infusion. Symptoms management/concerns will be reviewed fully at that visit     Plan of Care Education Review:   Assessment completed with:: Patient    Plan of Care Education   Diagnosis:: Cervical Cancer  Does patient understand diagnosis?: Yes  Tx plan/regimen:: CISplatin + radiation  Does patient understand treatment plan/regimen?: Yes  Side effect education:: Diarrhea/Constipation;Lab value monitoring (anemia, neutropenia, thrombocytopenia);Nausea/Vomiting;Infection (Education Folder given and will be reviewed at NP visit)  Plan of Care:: NIKKO follow-up appointment;Treatment schedule (Treatment schedule requested)    Evaluation of Learning  Patient Education Provided: Yes  Readiness:: Eager  Method:: Booklet/Handout;Literature  Response:: Verbalizes understanding           Intervention/Education provided during outreach:                                                       All needed appointments requested     Follow up call within one week of starting treatment     Signature:  Xiao Boateng RN

## 2024-04-11 NOTE — LETTER
2024         RE: Michelle Mills  [address] [facility] [address]        Dear Colleague,    Thank you for referring your patient, Michelle Mills, to the [facility] CANCER CLINIC. Please see a copy of my visit note below.    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    She has completed consult with radiation therapy with scheduled treatment start day of 24. She reports one episode of vaginal bleeding, and ongoing vaginal discharge. Requesting to transfer primary care, today she needs to refill HTN medication.She has cut down on smoking and wants help to quit.     She reported these symptoms to her primary care routine visit on 24 and was then referred to [facility].    Work up to date  24 noted a friable cervix  Pap: ASC-US, hrHPV 16 positive    24 Pelvic US - endometrial stripe 9 mm    3/1/24 Colposcopy - anterior cervix enlarged, cx biopsy taken and endometrial biopsy  Pathology cervix at 1 o'clock squamous cell carcinoma extending to resection margin  ECC:fragments of squamous cell carcinoma, papillary variant  EMBx: special fragment of squamous cell carcinoma    3/22/24 PET CT  CERVIX 7.4 X 4.6 cm, SUV 19.8, right common vivian 1.3 cm x 1.1 cm, low level SUV 3.1 cm,    3/28/24 MRI Pelvic  Large mass confined to the cervix, without extension to parametrial or lower vagina    OBGYN history and Health Maintenance:    Last Pap Smear: see above  Last Mammogram: never  Last Colonoscopy:  never       Past Medical History:   Diagnosis Date     HTN (hypertension)        No past surgical history on file.       Current Outpatient Medications   Medication Sig  Dispense Refill     chlorthalidone (HYGROTON) 25 MG tablet 1 tablet in the morning with food Orally       naproxen (NAPROSYN) 500 MG tablet 1 tablet with food or milk as needed, do not take along with aleve, aspirin, ibuprofen or motrin Orally every 12 hrs for 15 days       vitamin D2 (ERGOCALCIFEROL) 53060 units (1250 mcg) capsule 1 capsule with meals Orally Once weekly for 90 days           Allergies   Allergen Reactions     Codeine      Other Reaction(s): Unknown       Social History:  Social History     Tobacco Use     Smoking status: Every Day     Types: Cigarettes     Smokeless tobacco: Never   Substance Use Topics     Alcohol use: Not on file       Family History:   The patient's family history is notable for no breast or ovarian cancer.  No family history on file.      Physical Exam:     /79   Pulse 99   Temp 98.1  F (36.7  C) (Oral)   Resp 18   Wt 87.1 kg (192 lb)   SpO2 98%   BMI 37.69 kg/m    Body mass index is 37.69 kg/m .    General: Alert and oriented, no acute distress  Psych: Mood stable  Cardiovascular: RRR, no murmors  Pulmonary: Lungs clear . Normal respiratory effort    Assessment:samantha Michelle Mills is a 53 year old woman with a new diagnosis of likely Stage IB3 squamous cell carcinoma of the cervix.     She is a smoker, trying to quit, wants to discuss this with PCP     HTN    Plan:     1.)    Cervical cancer -  reviewed ongoing plan, to start chemoradiation therapy on 4/22/24. Reviewed the role of Cisplatin at 40 mg/m2 every week during the course of radiation therapy.  Benefits and side effects of treatment reviewed. She agrees to chemotherapy.    2.) Vagina discharge is due to the cancer and will improve once treatment starts     3.) Smoking cessation - plans to discuss with PCP, referral sent    4.)  HTN - refilled medication for 2 months until she establishes primary care clinic      Katiuska Edward M.D., MPH,  F.A.C.O.G.  Professor  Department of Ob/Gyn and Women's Health  Division  of Gynecologic Oncology  [facility]  [phone]     Time: total time spent today, 25 , including preparation, review of outside records, face to face counseling, and documentation.

## 2024-04-12 DIAGNOSIS — C53.1 MALIGNANT NEOPLASM OF EXOCERVIX (H): Primary | ICD-10-CM

## 2024-04-12 RX ORDER — KETOROLAC TROMETHAMINE 30 MG/ML
30 INJECTION, SOLUTION INTRAMUSCULAR; INTRAVENOUS ONCE
Status: CANCELLED | OUTPATIENT
Start: 2024-04-12 | End: 2024-04-12

## 2024-04-18 ENCOUNTER — VIRTUAL VISIT (OUTPATIENT)
Dept: ONCOLOGY | Facility: CLINIC | Age: 54
End: 2024-04-18
Attending: OBSTETRICS & GYNECOLOGY
Payer: COMMERCIAL

## 2024-04-18 DIAGNOSIS — C53.1 MALIGNANT NEOPLASM OF EXOCERVIX (H): Primary | ICD-10-CM

## 2024-04-18 PROCEDURE — 99214 OFFICE O/P EST MOD 30 MIN: CPT | Mod: 95 | Performed by: NURSE PRACTITIONER

## 2024-04-18 NOTE — LETTER
2024         RE: Michelle Mills  [address]  [facility] [address]        Dear Colleague,    Thank you for referring your patient, Michelle Mills, to the [facility] CANCER CLINIC. Please see a copy of my visit note below.    Virtual Visit Details    Type of service:  Video Visit   Originating Location (pt. Location): Home  Distant Location (provider location):  On-site  Platform used for Video Visit: [facility]    Gynecologic Oncology Follow Up Note    RE: Michelle Mills   : 1970  PRONOUNS: she/her/hers  HELGA: 2024  GYNECOLOGIC ONCOLOGIST: Katiuska Edward MD    CC: Michelle Mills is a 53 year old female with stage IB3 cervical cancer presenting for chemotherapy education prior starting cisplatin 40mg/m2 IV weekly as radiotherapy sensitizer    INTERVAL HISTORY:    Michelle is feeling generally well today without any new concerns. She has received written information regarding her chemotherapy. She feels ready to proceed and is scheduled to start radiation on Monday, 24 and chemotherapy on Tuesday, 24.    She is the  at [employer]. She does have some student facing interaction, but she states that she is able to avoid this if she needs to. She wears a mask outside the home.    Venous access: peripheral  Take home medications: ondansetron, prochlorperazine, dexamethasone    ONCOLOGY HISTORY:  24 noted a friable cervix  Pap: ASC-US, hrHPV 16 positive     24 Pelvic US - endometrial stripe 9 mm     3/1/24 Colposcopy - anterior cervix enlarged, cx biopsy taken and endometrial biopsy  Pathology cervix at 1 o'clock squamous cell carcinoma extending to resection margin  ECC:fragments of squamous cell carcinoma, papillary variant  EMBx: special fragment of squamous cell carcinoma     3/22/24 PET CT  CERVIX 7.4 X 4.6 cm, SUV 19.8, right common vviian 1.3 cm x 1.1 cm, low level SUV 3.1 cm,     3/28/24 MRI Pelvic  Large mass confined to the cervix,  without extension to parametrial or lower vagina        Past Medical History:   Diagnosis Date     HTN (hypertension)       No past surgical history on file.  Social History     Socioeconomic History     Marital status: Single   Tobacco Use     Smoking status: Every Day     Types: Cigarettes     Passive exposure: Current     Smokeless tobacco: Never      No family history on file.   Current Outpatient Medications   Medication Sig Dispense Refill     chlorthalidone (HYGROTON) 25 MG tablet Take 1 tablet (25 mg) by mouth daily 60 tablet 0     chlorthalidone (HYGROTON) 25 MG tablet 1 tablet in the morning with food Orally       naproxen (NAPROSYN) 500 MG tablet 1 tablet with food or milk as needed, do not take along with aleve, aspirin, ibuprofen or motrin Orally every 12 hrs for 15 days       vitamin D2 (ERGOCALCIFEROL) 71723 units (1250 mcg) capsule 1 capsule with meals Orally Once weekly for 90 days       No current facility-administered medications for this visit.      Allergies   Allergen Reactions     Codeine      Other Reaction(s): Unknown          OBJECTIVE:    PHYSICAL EXAM:  No vitals taken- virtual visit  Constitutional: Alert and oriented non-toxic appearing female in no acute distress  HEENT: No periorbital edema or perioral cyanosis  Resp: Respirations unlabored, speaking in full sentences without apparent dyspnea, wheezing, or cough  Psych: Pleasant and interactive, affect euthymic, makes appropriate eye contact, answers questions appropriately, thought content logical        ASSESSMENT/PLAN:    1) Stage IB3 squamous cell carcinoma of the cervix: Generally doing well and prepared to start treatment. Plan to begin treatment with cisplatin sensitizer 40mg/m2 IV weekly x5 weeks per Dr. Edward, to be given in conjunction with pelvic radiation. Reviewed that there may be the possibility of adding on another dose of chemotherapy pending her course. We reviewed rationale for treatment, timing and duration of  treatments, home medications, and numbers to call for concerns. Reviewed potential toxicities of proposed regimen, including but not limited to GI effects, renal effects, hematologic effects, fatigue, ototoxicity, electrolyte disturbances, risks of infection and bleeding. Given written information on resources for support. Reviewed take home medications- will be picked up day of infusion. Given written information on cisplatin per [facility]. Return to clinic on 4/23/24 for C1D1 cisplatin, to have follow up with me on 4/25/24. Reviewed alarm signs and symptoms and when to seek further care.   2) Patient verbalized understanding of and agreement with plan    MDM:  38 minutes spent on date of service on visit, including chart review, face to face visit, documentation, and coordination of care.    ANDREW Brown, CNP (she/her)  Division of Gynecologic Oncology           Again, thank you for allowing me to participate in the care of your patient.        Sincerely,        ANDREW Brown CNP

## 2024-04-18 NOTE — PATIENT INSTRUCTIONS
Your visit today was with Jayda Calvo CNP for chemotherapy education.    Plan:  Your proposed regimen: cisplatin 40mg/m2 IV weekly while undergoing radiation  I will send a [employer] message with information regarding cisplatin as well  Return to clinic on Tuesday as scheduled for chemotherapy- the pharmacist will review the take home medications for nausea at that time and these will be brought to you then      Your team:  Gynecologic oncologist: Dr. Wagner HOPKINS care coordinator: Sonia Boateng RN- [phone]   Nurse practitioner: Jayda Calvo CNP      For urgent questions or concerns, please call rather than send a medical message.   M-F 8-4:30: [phone]  After hours, weekends, and holidays: [phone] and ask to speak to the gynecologic oncology resident on call     Make sure to stay well hydrated- if you are having difficulty keeping down fluids or are feeling lightheaded, weak, dizzy, etc., please let us know.    It is important to control nausea before it spikes- you will be given an oral steroid (dexamethasone) to take once daily in the mornings for three days in a row after chemo (if you are having your chemo on Tuesday, you would take this Wednesday, Thursday, and Friday). Please take it with food and a glass of water. If after the first two doses of chemotherapy you are feeling minimal nausea, then we could consider stopping the steroid with the third and subsequent cycles. You can take prochlorperazine (Compazine) every 8 hours as needed right away with starting treatment. You can take ondansetron (Zofran) every six hours as needed starting 72 hours after chemo.  Salt and soda rinses can be used to keep the mouth clean, moist, and healthy as well as decrease the risk of mouth sores with chemotherapy. The solution can be made in advance and kept in the fridge for 72 hours at a time.    Recipe:  One liter of water  1/2 tsp salt  1/2 tsp baking soda    Swish and spit at least 3-4 times per  day- this can be done after meals and before bed, but also more frequently if you desire.    SUPPORTIVE SERVICES AND RESOURCES    [employer]: Support community in [place] offering a variety of free programs to cancer survivors and their loved ones    Www.[email or web address]    Look Good, Feel Better: Free program offered by the [employer] providing in-person and virtual workshops on beauty techniques while going through cancer treatment.    Www.lookgoodfeelbetter.org    [employer]: Non-profit organization in [place] offering a wide range of holistic services (meditation, energy work, nutrition, art, grief work, etc.) to those experiencing life-altering illness. Services available in-person and virtually.    www.[place]ways[place].org    [employer]erk: Free program with locations throughout the metro area offering strength training sessions to cancer survivors.    [email or web address]

## 2024-04-18 NOTE — PROGRESS NOTES
Virtual Visit Details    Type of service:  Video Visit   Originating Location (pt. Location): Home  Distant Location (provider location):  On-site  Platform used for Video Visit: [facility]    Gynecologic Oncology Follow Up Note    RE: Michelle Mills   : 1970  PRONOUNS: she/her/hers  HELGA: 2024  GYNECOLOGIC ONCOLOGIST: Katiuska Edward MD    CC: Michelle Mills is a 53 year old female with stage IB3 cervical cancer presenting for chemotherapy education prior starting cisplatin 40mg/m2 IV weekly as radiotherapy sensitizer    INTERVAL HISTORY:    Michelle is feeling generally well today without any new concerns. She has received written information regarding her chemotherapy. She feels ready to proceed and is scheduled to start radiation on Monday, 24 and chemotherapy on Tuesday, 24.    She is the  at [employer]. She does have some student facing interaction, but she states that she is able to avoid this if she needs to. She wears a mask outside the home.    Venous access: peripheral  Take home medications: ondansetron, prochlorperazine, dexamethasone    ONCOLOGY HISTORY:  24 noted a friable cervix  Pap: ASC-US, hrHPV 16 positive     24 Pelvic US - endometrial stripe 9 mm     3/1/24 Colposcopy - anterior cervix enlarged, cx biopsy taken and endometrial biopsy  Pathology cervix at 1 o'clock squamous cell carcinoma extending to resection margin  ECC:fragments of squamous cell carcinoma, papillary variant  EMBx: special fragment of squamous cell carcinoma     3/22/24 PET CT  CERVIX 7.4 X 4.6 cm, SUV 19.8, right common vivian 1.3 cm x 1.1 cm, low level SUV 3.1 cm,     3/28/24 MRI Pelvic  Large mass confined to the cervix, without extension to parametrial or lower vagina        Past Medical History:   Diagnosis Date    HTN (hypertension)       No past surgical history on file.  Social History     Socioeconomic History    Marital status: Single   Tobacco Use    Smoking  status: Every Day     Types: Cigarettes     Passive exposure: Current    Smokeless tobacco: Never      No family history on file.   Current Outpatient Medications   Medication Sig Dispense Refill    chlorthalidone (HYGROTON) 25 MG tablet Take 1 tablet (25 mg) by mouth daily 60 tablet 0    chlorthalidone (HYGROTON) 25 MG tablet 1 tablet in the morning with food Orally      naproxen (NAPROSYN) 500 MG tablet 1 tablet with food or milk as needed, do not take along with aleve, aspirin, ibuprofen or motrin Orally every 12 hrs for 15 days      vitamin D2 (ERGOCALCIFEROL) 62934 units (1250 mcg) capsule 1 capsule with meals Orally Once weekly for 90 days       No current facility-administered medications for this visit.      Allergies   Allergen Reactions    Codeine      Other Reaction(s): Unknown          OBJECTIVE:    PHYSICAL EXAM:  No vitals taken- virtual visit  Constitutional: Alert and oriented non-toxic appearing female in no acute distress  HEENT: No periorbital edema or perioral cyanosis  Resp: Respirations unlabored, speaking in full sentences without apparent dyspnea, wheezing, or cough  Psych: Pleasant and interactive, affect euthymic, makes appropriate eye contact, answers questions appropriately, thought content logical        ASSESSMENT/PLAN:    1) Stage IB3 squamous cell carcinoma of the cervix: Generally doing well and prepared to start treatment. Plan to begin treatment with cisplatin sensitizer 40mg/m2 IV weekly x5 weeks per Dr. Edward, to be given in conjunction with pelvic radiation. Reviewed that there may be the possibility of adding on another dose of chemotherapy pending her course. We reviewed rationale for treatment, timing and duration of treatments, home medications, and numbers to call for concerns. Reviewed potential toxicities of proposed regimen, including but not limited to GI effects, renal effects, hematologic effects, fatigue, ototoxicity, electrolyte disturbances, risks of infection and  bleeding. Given written information on resources for support. Reviewed take home medications- will be picked up day of infusion. Given written information on cisplatin per [facility]. Return to clinic on 4/23/24 for C1D1 cisplatin, to have follow up with me on 4/25/24. Reviewed alarm signs and symptoms and when to seek further care.   2) Patient verbalized understanding of and agreement with plan    MDM:  38 minutes spent on date of service on visit, including chart review, face to face visit, documentation, and coordination of care.    ANDREW Brown, CNP (she/her)  Division of Gynecologic Oncology

## 2024-04-19 DIAGNOSIS — Z51.11 ENCOUNTER FOR ANTINEOPLASTIC CHEMOTHERAPY: Primary | ICD-10-CM

## 2024-04-22 ENCOUNTER — TELEPHONE (OUTPATIENT)
Dept: ONCOLOGY | Facility: CLINIC | Age: 54
End: 2024-04-22

## 2024-04-22 ENCOUNTER — APPOINTMENT (OUTPATIENT)
Dept: LAB | Facility: CLINIC | Age: 54
End: 2024-04-22
Attending: PHYSICAL MEDICINE & REHABILITATION
Payer: COMMERCIAL

## 2024-04-22 ENCOUNTER — OFFICE VISIT (OUTPATIENT)
Dept: RADIATION ONCOLOGY | Facility: CLINIC | Age: 54
End: 2024-04-22
Attending: RADIOLOGY
Payer: COMMERCIAL

## 2024-04-22 VITALS
BODY MASS INDEX: 37.69 KG/M2 | HEART RATE: 82 BPM | WEIGHT: 192 LBS | DIASTOLIC BLOOD PRESSURE: 75 MMHG | SYSTOLIC BLOOD PRESSURE: 122 MMHG

## 2024-04-22 DIAGNOSIS — C53.1 MALIGNANT NEOPLASM OF EXOCERVIX (H): Primary | ICD-10-CM

## 2024-04-22 LAB
ALBUMIN SERPL BCG-MCNC: 4 G/DL (ref 3.5–5.2)
ALP SERPL-CCNC: 102 U/L (ref 40–150)
ALT SERPL W P-5'-P-CCNC: 24 U/L (ref 0–50)
ANION GAP SERPL CALCULATED.3IONS-SCNC: 13 MMOL/L (ref 7–15)
AST SERPL W P-5'-P-CCNC: 27 U/L (ref 0–45)
BASOPHILS # BLD AUTO: 0.1 10E3/UL (ref 0–0.2)
BASOPHILS NFR BLD AUTO: 1 %
BILIRUB SERPL-MCNC: 0.5 MG/DL
BUN SERPL-MCNC: 6.6 MG/DL (ref 6–20)
CALCIUM SERPL-MCNC: 9.6 MG/DL (ref 8.6–10)
CHLORIDE SERPL-SCNC: 98 MMOL/L (ref 98–107)
CREAT SERPL-MCNC: 0.67 MG/DL (ref 0.51–0.95)
DEPRECATED HCO3 PLAS-SCNC: 28 MMOL/L (ref 22–29)
EGFRCR SERPLBLD CKD-EPI 2021: >90 ML/MIN/1.73M2
EOSINOPHIL # BLD AUTO: 0.3 10E3/UL (ref 0–0.7)
EOSINOPHIL NFR BLD AUTO: 5 %
ERYTHROCYTE [DISTWIDTH] IN BLOOD BY AUTOMATED COUNT: 17.7 % (ref 10–15)
GLUCOSE SERPL-MCNC: 114 MG/DL (ref 70–99)
HCT VFR BLD AUTO: 35.1 % (ref 35–47)
HGB BLD-MCNC: 11.4 G/DL (ref 11.7–15.7)
IMM GRANULOCYTES # BLD: 0 10E3/UL
IMM GRANULOCYTES NFR BLD: 1 %
LYMPHOCYTES # BLD AUTO: 1.7 10E3/UL (ref 0.8–5.3)
LYMPHOCYTES NFR BLD AUTO: 28 %
MAGNESIUM SERPL-MCNC: 1.8 MG/DL (ref 1.7–2.3)
MCH RBC QN AUTO: 30 PG (ref 26.5–33)
MCHC RBC AUTO-ENTMCNC: 32.5 G/DL (ref 31.5–36.5)
MCV RBC AUTO: 92 FL (ref 78–100)
MONOCYTES # BLD AUTO: 0.7 10E3/UL (ref 0–1.3)
MONOCYTES NFR BLD AUTO: 11 %
NEUTROPHILS # BLD AUTO: 3.5 10E3/UL (ref 1.6–8.3)
NEUTROPHILS NFR BLD AUTO: 54 %
NRBC # BLD AUTO: 0 10E3/UL
NRBC BLD AUTO-RTO: 0 /100
PLATELET # BLD AUTO: 256 10E3/UL (ref 150–450)
POTASSIUM SERPL-SCNC: 3.4 MMOL/L (ref 3.4–5.3)
PROT SERPL-MCNC: 8.1 G/DL (ref 6.4–8.3)
RBC # BLD AUTO: 3.8 10E6/UL (ref 3.8–5.2)
SODIUM SERPL-SCNC: 139 MMOL/L (ref 135–145)
WBC # BLD AUTO: 6.2 10E3/UL (ref 4–11)

## 2024-04-22 PROCEDURE — 77386 HC IMRT TREATMENT DELIVERY, COMPLEX: CPT | Performed by: RADIOLOGY

## 2024-04-22 PROCEDURE — 82374 ASSAY BLOOD CARBON DIOXIDE: CPT | Performed by: OBSTETRICS & GYNECOLOGY

## 2024-04-22 PROCEDURE — 36415 COLL VENOUS BLD VENIPUNCTURE: CPT | Performed by: OBSTETRICS & GYNECOLOGY

## 2024-04-22 PROCEDURE — 85025 COMPLETE CBC W/AUTO DIFF WBC: CPT | Performed by: OBSTETRICS & GYNECOLOGY

## 2024-04-22 PROCEDURE — 83735 ASSAY OF MAGNESIUM: CPT | Performed by: OBSTETRICS & GYNECOLOGY

## 2024-04-22 RX ORDER — ONDANSETRON 8 MG/1
8 TABLET, FILM COATED ORAL EVERY 8 HOURS PRN
Qty: 30 TABLET | Refills: 2 | Status: SHIPPED | OUTPATIENT
Start: 2024-04-22

## 2024-04-22 RX ORDER — PROCHLORPERAZINE MALEATE 10 MG
10 TABLET ORAL EVERY 6 HOURS PRN
Qty: 30 TABLET | Refills: 2 | Status: SHIPPED | OUTPATIENT
Start: 2024-04-22

## 2024-04-22 RX ORDER — DEXAMETHASONE 4 MG/1
8 TABLET ORAL DAILY
Qty: 12 TABLET | Refills: 2 | Status: SHIPPED | OUTPATIENT
Start: 2024-04-22 | End: 2024-05-13

## 2024-04-22 NOTE — LETTER
2024         RE: Michelle Mills  [address]  [facility] [address]        Dear Colleague,    Thank you for referring your patient, Michelle Mills, to the [facility] RADIATION ONCOLOGY. Please see a copy of my visit note below.    RADIATION ONCOLOGY WEEKLY ON TREATMENT VISIT   Encounter Date: 2024    Patient Name: Michelle Mills  MRN: [id number]  : 1970     Disease and Stage: Squamous cell carcinoma of the cervix, FIGO IB3   Treatment Site: Pelvis  Current Dose/Planned Total Dose: [180] cGy / [4500] cGy followed by HDR brachytherapy    Concurrent Chemotherapy: Yes  Drug and Frequency: Weekly cisplatin    Gynecologic Oncologist: Katiuska Edward MD    Subjective: Ms. Mills presents to clinic today for her weekly on-treatment visit. She is tolerating radiation well. She has her first cycle of chemotherapy tomorrow.    Nursing ROS:   Nutrition Alteration  Diet Type: Patient's Preference  Skin  Skin Reaction: 0 - No changes     ENT and Mouth Exam  Mucositis - Current: 0 - None      Gastrointestinal  Nausea: 0 - None  Diarrhea: 0 - None  Genitourinary  Urinary Status: 0 - Normal     Pain Assessment  0-10 Pain Scale: 0      PEG Tube: No  Electronic Cardiac Implant: No     Objective:   There were no vitals taken for this visit.  /75   Pulse 82   Wt 87.1 kg (192 lb)   BMI 37.69 kg/m    Gen: Appears well, NAD  Abdomen: Nondistended  Skin: No erythema    Laboratory:  Lab Results   Component Value Date    WBC 6.2 2024    HGB 11.4 (L) 2024    HCT 35.1 2024    MCV 92 2024     2024     Lab Results   Component Value Date    CR 0.67 2024     Lab Results   Component Value Date     2024    POTASSIUM 3.4 2024    CHLORIDE 98 2024    CO2 28 2024     (H) 2024     Lab Results   Component Value Date    AST 27 2024    ALT 24 2024    ALKPHOS 102 2024    BILITOTAL 0.5 2024     Magnesium    Date Value Ref Range Status   04/22/2024 1.8 1.7 - 2.3 mg/dL Final     Treatment-related toxicities (CTCAE v5.0):  Anorexia: Grade 0: No toxicity  Fatigue: Grade 0: No toxicity  Nausea: Grade 0: No toxicity  Pain: Grade 0: No toxicity  Diarrhea: Grade 0: No toxicity  Dermatitis: Grade 0: No toxicity    ED visits/Hospitalizations:  None    Missed Treatments:  None    Mosaiq chart and setup information reviewed  IGRT images reviewed    Assessment:    Ms. Mills is a 53 year old female with a squamous cell carcinoma of the cervix, FIGO IB3, with tumor size greater than 4 cm, who is receiving definitive chemoradiation. She is tolerating radiation well.    Plan:   Continue radiation as planned      Dede Henry  Department of Radiation Oncology  [facility]                 Again, thank you for allowing me to participate in the care of your patient.        Sincerely,        eDde Henry MD

## 2024-04-22 NOTE — LETTER
4/22/2024         RE: Michelle Mills  [address]  [facility] [address]        Dear Colleague,    Thank you for referring your patient, Michelle Mills, to the [facility] RADIATION ONCOLOGY. Please see a copy of my visit note below.    Treatment planning verification      Again, thank you for allowing me to participate in the care of your patient.        Sincerely,        Dede Henry MD

## 2024-04-22 NOTE — TELEPHONE ENCOUNTER
PA Initiation    Medication: ONDANSETRON 8 MG PO TBDP  Insurance Company: [employer] - Phone [phone] Fax [phone]  Pharmacy Filling the Rx:    Filling Pharmacy Phone:    Filling Pharmacy Fax:    Start Date: 4/22/2024

## 2024-04-23 ENCOUNTER — APPOINTMENT (OUTPATIENT)
Dept: RADIATION ONCOLOGY | Facility: CLINIC | Age: 54
End: 2024-04-23
Attending: RADIOLOGY
Payer: COMMERCIAL

## 2024-04-23 ENCOUNTER — INFUSION THERAPY VISIT (OUTPATIENT)
Dept: ONCOLOGY | Facility: CLINIC | Age: 54
End: 2024-04-23
Attending: OBSTETRICS & GYNECOLOGY
Payer: COMMERCIAL

## 2024-04-23 VITALS
HEIGHT: 60 IN | BODY MASS INDEX: 38.28 KG/M2 | DIASTOLIC BLOOD PRESSURE: 84 MMHG | RESPIRATION RATE: 17 BRPM | OXYGEN SATURATION: 99 % | WEIGHT: 195 LBS | TEMPERATURE: 98.2 F | SYSTOLIC BLOOD PRESSURE: 125 MMHG | HEART RATE: 98 BPM

## 2024-04-23 DIAGNOSIS — C53.1 MALIGNANT NEOPLASM OF EXOCERVIX (H): ICD-10-CM

## 2024-04-23 DIAGNOSIS — C53.8 MALIGNANT NEOPLASM OF OVERLAPPING SITES OF CERVIX (H): Primary | ICD-10-CM

## 2024-04-23 DIAGNOSIS — Z51.11 ENCOUNTER FOR ANTINEOPLASTIC CHEMOTHERAPY: ICD-10-CM

## 2024-04-23 PROCEDURE — 96413 CHEMO IV INFUSION 1 HR: CPT

## 2024-04-23 PROCEDURE — 96375 TX/PRO/DX INJ NEW DRUG ADDON: CPT

## 2024-04-23 PROCEDURE — 96367 TX/PROPH/DG ADDL SEQ IV INF: CPT

## 2024-04-23 PROCEDURE — 999N000127 HC STATISTIC PERIPHERAL IV START W US GUIDANCE

## 2024-04-23 PROCEDURE — 77386 HC IMRT TREATMENT DELIVERY, COMPLEX: CPT | Performed by: RADIOLOGY

## 2024-04-23 PROCEDURE — 250N000011 HC RX IP 250 OP 636: Performed by: OBSTETRICS & GYNECOLOGY

## 2024-04-23 PROCEDURE — 999N000202 HC STATISTICAL VASC ACCESS NURSE TIME, 1-15 MINUTES

## 2024-04-23 PROCEDURE — 258N000003 HC RX IP 258 OP 636: Performed by: OBSTETRICS & GYNECOLOGY

## 2024-04-23 PROCEDURE — 77014 PR CT GUIDE FOR PLACEMENT RADIATION THERAPY FIELDS: CPT | Mod: 26 | Performed by: RADIOLOGY

## 2024-04-23 RX ORDER — DEXTROSE, SODIUM CHLORIDE, AND POTASSIUM CHLORIDE 5; .45; .15 G/100ML; G/100ML; G/100ML
2000 INJECTION INTRAVENOUS ONCE
Status: COMPLETED | OUTPATIENT
Start: 2024-04-23 | End: 2024-04-23

## 2024-04-23 RX ORDER — PALONOSETRON 0.05 MG/ML
0.25 INJECTION, SOLUTION INTRAVENOUS ONCE
Status: COMPLETED | OUTPATIENT
Start: 2024-04-23 | End: 2024-04-23

## 2024-04-23 RX ADMIN — DEXAMETHASONE SODIUM PHOSPHATE: 10 INJECTION, SOLUTION INTRAMUSCULAR; INTRAVENOUS at 09:55

## 2024-04-23 RX ADMIN — POTASSIUM CHLORIDE, DEXTROSE MONOHYDRATE AND SODIUM CHLORIDE 2000 ML: 150; 5; 450 INJECTION, SOLUTION INTRAVENOUS at 10:26

## 2024-04-23 RX ADMIN — PALONOSETRON HYDROCHLORIDE 0.25 MG: 0.25 INJECTION INTRAVENOUS at 09:51

## 2024-04-23 RX ADMIN — CISPLATIN 80 MG: 1 INJECTION, SOLUTION INTRAVENOUS at 11:37

## 2024-04-23 ASSESSMENT — PAIN SCALES - GENERAL: PAINLEVEL: NO PAIN (0)

## 2024-04-23 NOTE — PROGRESS NOTES
RADIATION ONCOLOGY WEEKLY ON TREATMENT VISIT   Encounter Date: 2024    Patient Name: Michelle Mills  MRN: [id number]  : 1970     Disease and Stage: Squamous cell carcinoma of the cervix, FIGO IB3   Treatment Site: Pelvis  Current Dose/Planned Total Dose: [180] cGy / [4500] cGy followed by HDR brachytherapy    Concurrent Chemotherapy: Yes  Drug and Frequency: Weekly cisplatin    Gynecologic Oncologist: Katiuska Edward MD    Subjective: Ms. Mills presents to clinic today for her weekly on-treatment visit. She is tolerating radiation well. She has her first cycle of chemotherapy tomorrow.    Nursing ROS:   Nutrition Alteration  Diet Type: Patient's Preference  Skin  Skin Reaction: 0 - No changes     ENT and Mouth Exam  Mucositis - Current: 0 - None      Gastrointestinal  Nausea: 0 - None  Diarrhea: 0 - None  Genitourinary  Urinary Status: 0 - Normal     Pain Assessment  0-10 Pain Scale: 0      PEG Tube: No  Electronic Cardiac Implant: No     Objective:   There were no vitals taken for this visit.  /75   Pulse 82   Wt 87.1 kg (192 lb)   BMI 37.69 kg/m    Gen: Appears well, NAD  Abdomen: Nondistended  Skin: No erythema    Laboratory:  Lab Results   Component Value Date    WBC 6.2 2024    HGB 11.4 (L) 2024    HCT 35.1 2024    MCV 92 2024     2024     Lab Results   Component Value Date    CR 0.67 2024     Lab Results   Component Value Date     2024    POTASSIUM 3.4 2024    CHLORIDE 98 2024    CO2 28 2024     (H) 2024     Lab Results   Component Value Date    AST 27 2024    ALT 24 2024    ALKPHOS 102 2024    BILITOTAL 0.5 2024     Magnesium   Date Value Ref Range Status   2024 1.8 1.7 - 2.3 mg/dL Final     Treatment-related toxicities (CTCAE v5.0):  Anorexia: Grade 0: No toxicity  Fatigue: Grade 0: No toxicity  Nausea: Grade 0: No toxicity  Pain: Grade 0: No toxicity  Diarrhea: Grade 0:  No toxicity  Dermatitis: Grade 0: No toxicity    ED visits/Hospitalizations:  None    Missed Treatments:  None    Mosaiq chart and setup information reviewed  IGRT images reviewed    Assessment:    Ms. Mills is a 53 year old female with a squamous cell carcinoma of the cervix, FIGO IB3, with tumor size greater than 4 cm, who is receiving definitive chemoradiation. She is tolerating radiation well.    Plan:   Continue radiation as planned      Dede Henry  Department of Radiation Oncology  [facility]

## 2024-04-23 NOTE — PROGRESS NOTES
Infusion Nursing Note:  Michelle Mills presents today for Cycle 1 Day 1 Cisplatin.    Patient seen by provider today: No: Virtual visit with Jayda Calvo CNP on 04/18/24.   present during visit today: Not Applicable.    Note: Patient denies any signs or symptoms of infection. Patient offers no complaints or concerns. Patient was seen and assessed by Jayda Calvo CNP on 04/18/24. Patient agreeable to treatment plan today.    Pt new to infusion today. Teaching completed previously by Jayda Calvo CNP and reinforced by this RN. All medications reviewed and questions answered. Pt oriented to infusion room, call light, bathrooms and unit routines. Pt aware to call [facility] with chills and/or temperature >100.4F, uncontrolled nausea/vomiting/diarrhea, shortness of breath, chest pain, bleeding or any questions/concerns. Sent home with thermometer.    Patient voided before and after Cisplatin infusion.    Intravenous Access:  Peripheral IV placed by vascular access.    Treatment Conditions:  Lab Results   Component Value Date    HGB 11.4 (L) 04/22/2024    WBC 6.2 04/22/2024    ANEUTAUTO 3.5 04/22/2024     04/22/2024        Lab Results   Component Value Date     04/22/2024    POTASSIUM 3.4 04/22/2024    MAG 1.8 04/22/2024    CR 0.67 04/22/2024    IVON 9.6 04/22/2024    BILITOTAL 0.5 04/22/2024    ALBUMIN 4.0 04/22/2024    ALT 24 04/22/2024    AST 27 04/22/2024     Results reviewed, labs MET treatment parameters, ok to proceed with treatment.    Post Infusion Assessment:  Patient tolerated infusion without incident.  Blood return noted pre and post infusion.  Site patent and intact, free from redness, edema or discomfort.  No evidence of extravasations.  Access discontinued per protocol.     Discharge Plan:   Prescription refills given for Dexamethasone, Zofran, and Compazine.  Discharge instructions reviewed with: Patient.  Patient and/or family verbalized understanding of discharge  instructions and all questions answered.  Copy of AVS reviewed with patient and/or family.  Patient will return 04/29/24 for next appointment.  Patient discharged in stable condition accompanied by: self.  Departure Mode: Ambulatory.      Cristine Cole RN

## 2024-04-23 NOTE — PATIENT INSTRUCTIONS
You received an anti-nausea medication called Aloxi today. Aloxi is in the same drug class as one of your take home as needed anti-nausea medications called Ondanestron or Zofran. Do not take your Zofran prescription for the next three days because you are covered by the Aloxi you received today. You can begin to take your Zofran prescription as needed starting on Friday, April 26th. If you need coverage for your nausea before then, feel free to use your compazine prescription.     [facility] Triage and after hours / weekends / holidays:  [phone]    Please call the triage or after hours line if you experience a temperature greater than or equal to 100.4, shaking chills, have uncontrolled nausea, vomiting and/or diarrhea, dizziness, shortness of breath, chest pain, bleeding, unexplained bruising, or if you have any other new/concerning symptoms, questions or concerns.      If you are having any concerning symptoms or wish to speak to a provider before your next infusion visit, please call triage to notify them so we can adequately serve you.     If you need a refill on a narcotic prescription or other medication, please call before your infusion appointment.

## 2024-04-23 NOTE — TELEPHONE ENCOUNTER
Prior Authorization Approval    Medication: ONDANSETRON 8 MG PO TBDP  Authorization Effective Date: 3/23/2024  Authorization Expiration Date: 10/19/2024  Approved Dose/Quantity: 30 per 10 Ds  Reference #: [id number]   Insurance Company: [employer] - Phone [phone] Fax [phone]  Expected CoPay: $    CoPay Card Available:      Financial Assistance Needed:   Which Pharmacy is filling the prescription: [facility] [address]  Pharmacy Notified: yes  Patient Notified: yes            Thank you,    Lizbeht Live  Oncology Pharmacy Liaison II  [email or web address]  Phone: [phone]  Fax: [phone]

## 2024-04-24 ENCOUNTER — APPOINTMENT (OUTPATIENT)
Dept: RADIATION ONCOLOGY | Facility: CLINIC | Age: 54
End: 2024-04-24
Attending: RADIOLOGY
Payer: COMMERCIAL

## 2024-04-24 PROCEDURE — 77386 HC IMRT TREATMENT DELIVERY, COMPLEX: CPT | Performed by: RADIOLOGY

## 2024-04-24 PROCEDURE — 77014 PR CT GUIDE FOR PLACEMENT RADIATION THERAPY FIELDS: CPT | Mod: 26 | Performed by: RADIOLOGY

## 2024-04-25 ENCOUNTER — APPOINTMENT (OUTPATIENT)
Dept: RADIATION ONCOLOGY | Facility: CLINIC | Age: 54
End: 2024-04-25
Attending: RADIOLOGY
Payer: COMMERCIAL

## 2024-04-25 ENCOUNTER — VIRTUAL VISIT (OUTPATIENT)
Dept: ONCOLOGY | Facility: CLINIC | Age: 54
End: 2024-04-25
Attending: OBSTETRICS & GYNECOLOGY
Payer: COMMERCIAL

## 2024-04-25 ENCOUNTER — HOSPITAL ENCOUNTER (OUTPATIENT)
Facility: CLINIC | Age: 54
End: 2024-04-25
Payer: COMMERCIAL

## 2024-04-25 VITALS — WEIGHT: 196 LBS | HEIGHT: 60 IN | BODY MASS INDEX: 38.48 KG/M2

## 2024-04-25 DIAGNOSIS — G89.3 NEOPLASM RELATED PAIN: ICD-10-CM

## 2024-04-25 DIAGNOSIS — C53.1 MALIGNANT NEOPLASM OF EXOCERVIX (H): Primary | ICD-10-CM

## 2024-04-25 PROCEDURE — 77386 HC IMRT TREATMENT DELIVERY, COMPLEX: CPT | Performed by: RADIOLOGY

## 2024-04-25 PROCEDURE — 99213 OFFICE O/P EST LOW 20 MIN: CPT | Mod: 95 | Performed by: NURSE PRACTITIONER

## 2024-04-25 PROCEDURE — 77014 PR CT GUIDE FOR PLACEMENT RADIATION THERAPY FIELDS: CPT | Mod: 26 | Performed by: RADIOLOGY

## 2024-04-25 RX ORDER — OXYCODONE HYDROCHLORIDE 5 MG/1
5 TABLET ORAL EVERY 6 HOURS PRN
Qty: 10 TABLET | Refills: 0 | Status: SHIPPED | OUTPATIENT
Start: 2024-04-25 | End: 2024-04-28

## 2024-04-25 ASSESSMENT — PAIN SCALES - GENERAL: PAINLEVEL: SEVERE PAIN (6)

## 2024-04-25 NOTE — LETTER
2024         RE: Michelle Mills  [address]  [place] [address]        Dear Colleague,    Thank you for referring your patient, Michelle Mills, to the [facility]. Please see a copy of my visit note below.    Virtual Visit Details    Type of service:  Video Visit   Originating Location (pt. Location): Home  Distant Location (provider location):  On-site  Platform used for Video Visit: Well    Gynecologic Oncology Follow Up Note    RE: Michelle Mills   : 1970  PRONOUNS: she/her/hers  HELGA: 2024  GYNECOLOGIC ONCOLOGIST: Katiuska Edward MD    CC: Michelle Mills is a 53 year old female with stage IB3 cervical cancer presenting for a toxicity check while undergoing cisplatin 40mg/m2 IV weekly as radiotherapy sensitizer    INTERVAL HISTORY:    Michelle is feeling fair- she tolerated her first cisplatin infusion on 24 without adverse effects. She denies fevers, chills, tinnitus, neuropathy, nausea/vomiting, bowel concerns, or bladder concerns. No fevers or chills. Eating and drinking well.     Tends to have pelvic pain after radiation, typically worse in the evenings and sometimes in the mornings. Taking Tylenol 1000mg twice daily which helps for the most part, but there are times when Tylenol does not touch the pain. She took a muscle relaxer per her report a few months ago for pelvic pain without relief. Codeine allergy on her chart- she does not remember the reaction, states it was at least 30 years ago. Has had Percocet in the past and tolerated this without incident.    Still having some vaginal discharge- stable over time. No vaginal bleeding.        ONCOLOGY HISTORY:  24 noted a friable cervix  Pap: ASC-US, hrHPV 16 positive     24 Pelvic US - endometrial stripe 9 mm     3/1/24 Colposcopy - anterior cervix enlarged, cx biopsy taken and endometrial biopsy  Pathology cervix at 1 o'clock squamous cell carcinoma extending to resection  margin  ECC:fragments of squamous cell carcinoma, papillary variant  EMBx: special fragment of squamous cell carcinoma     3/22/24 PET CT  CERVIX 7.4 X 4.6 cm, SUV 19.8, right common vivian 1.3 cm x 1.1 cm, low level SUV 3.1 cm,     3/28/24 MRI Pelvic  Large mass confined to the cervix, without extension to parametrial or lower vagina        Past Medical History:   Diagnosis Date     HTN (hypertension)       No past surgical history on file.  Social History     Socioeconomic History     Marital status: Single   Tobacco Use     Smoking status: Every Day     Types: Cigarettes     Passive exposure: Current     Smokeless tobacco: Never      No family history on file.   Current Outpatient Medications   Medication Sig Dispense Refill     chlorthalidone (HYGROTON) 25 MG tablet Take 1 tablet (25 mg) by mouth daily 60 tablet 0     dexAMETHasone (DECADRON) 4 MG tablet Take 2 tablets (8 mg) by mouth daily Take for 3 days, starting the day after chemo on day 2 and 9. Take with food. If no nausea and vomiting with first cisplatin doses, may stop dexamethasone. 12 tablet 2     naproxen (NAPROSYN) 500 MG tablet 1 tablet with food or milk as needed, do not take along with aleve, aspirin, ibuprofen or motrin Orally every 12 hrs for 15 days       vitamin D2 (ERGOCALCIFEROL) 05788 units (1250 mcg) capsule 1 capsule with meals Orally Once weekly for 90 days       chlorthalidone (HYGROTON) 25 MG tablet 1 tablet in the morning with food Orally       ondansetron (ZOFRAN) 8 MG tablet Take 1 tablet (8 mg) by mouth every 8 hours as needed for nausea (vomiting) 30 tablet 2     prochlorperazine (COMPAZINE) 10 MG tablet Take 1 tablet (10 mg) by mouth every 6 hours as needed for nausea or vomiting 30 tablet 2     No current facility-administered medications for this visit.      Allergies   Allergen Reactions     Codeine      Other Reaction(s): Unknown          OBJECTIVE:    PHYSICAL EXAM:  No vitals taken- virtual visit  Constitutional: Alert and  oriented non-toxic appearing female in no acute distress  HEENT: No periorbital edema or perioral cyanosis  Resp: Respirations unlabored, speaking in full sentences without apparent dyspnea, wheezing, or cough  Psych: Pleasant and interactive, affect euthymic, makes appropriate eye contact, answers questions appropriately, thought content logical        ASSESSMENT/PLAN:    1) Stage IB3 squamous cell carcinoma of the cervix: Generally doing well, tolerated her first dose of cisplatin without incident. Continue treatment with cisplatin sensitizer 40mg/m2 IV weekly x5 weeks per Dr. Edward's plan, to be given in conjunction with pelvic radiation. Return to clinic on 4/29/24 for C1D8 cisplatin and labs. Reviewed alarm signs and symptoms and when to seek further care.   2) Pelvic pain: Worse with radiation. Low suspicion for an infection, likely secondary to cancer treatment. Continue Tylenol 1000mg BID. Discussed that I would prefer to avoid NSAIDs with her cisplatin- will trial oxycodone 5mg q6h PRN for severe pain that is not controlled with scheduled Tylenol. #10 ordered today, to be re-evaluated at her next clinic visit. Reviewed [place]  and reviewed safety with opioids, side effects, risks, and benefits. Has tolerated Percocet, low suspicion that she will have a reaction to oxycodone but reviewed s/sxs of this and when to seek further care.  3) Patient verbalized understanding of and agreement with plan    MDM:  28 minutes spent on date of service on visit, including chart review, face to face visit, documentation, and coordination of care.    ANDREW Brown, CNP (she/her)  Division of Gynecologic Oncology           Again, thank you for allowing me to participate in the care of your patient.        Sincerely,        ANDREW Brown CNP

## 2024-04-25 NOTE — PROGRESS NOTES
Virtual Visit Details    Type of service:  Video Visit   Originating Location (pt. Location): Home  Distant Location (provider location):  On-site  Platform used for Video Visit: [facility]    Gynecologic Oncology Follow Up Note    RE: Michelle Mills   : 1970  PRONOUNS: she/her/hers  HELGA: 2024  GYNECOLOGIC ONCOLOGIST: Katiuska Edward MD    CC: Michelle Mills is a 53 year old female with stage IB3 cervical cancer presenting for a toxicity check while undergoing cisplatin 40mg/m2 IV weekly as radiotherapy sensitizer    INTERVAL HISTORY:    Michelle is feeling fair- she tolerated her first cisplatin infusion on 24 without adverse effects. She denies fevers, chills, tinnitus, neuropathy, nausea/vomiting, bowel concerns, or bladder concerns. No fevers or chills. Eating and drinking well.     Tends to have pelvic pain after radiation, typically worse in the evenings and sometimes in the mornings. Taking Tylenol 1000mg twice daily which helps for the most part, but there are times when Tylenol does not touch the pain. She took a muscle relaxer per her report a few months ago for pelvic pain without relief. Codeine allergy on her chart- she does not remember the reaction, states it was at least 30 years ago. Has had Percocet in the past and tolerated this without incident.    Still having some vaginal discharge- stable over time. No vaginal bleeding.        ONCOLOGY HISTORY:  24 noted a friable cervix  Pap: ASC-US, hrHPV 16 positive     24 Pelvic US - endometrial stripe 9 mm     3/1/24 Colposcopy - anterior cervix enlarged, cx biopsy taken and endometrial biopsy  Pathology cervix at 1 o'clock squamous cell carcinoma extending to resection margin  ECC:fragments of squamous cell carcinoma, papillary variant  EMBx: special fragment of squamous cell carcinoma     3/22/24 PET CT  CERVIX 7.4 X 4.6 cm, SUV 19.8, right common vivian 1.3 cm x 1.1 cm, low level SUV 3.1 cm,     3/28/24 MRI Pelvic  Large mass  confined to the cervix, without extension to parametrial or lower vagina        Past Medical History:   Diagnosis Date    HTN (hypertension)       No past surgical history on file.  Social History     Socioeconomic History    Marital status: Single   Tobacco Use    Smoking status: Every Day     Types: Cigarettes     Passive exposure: Current    Smokeless tobacco: Never      No family history on file.   Current Outpatient Medications   Medication Sig Dispense Refill    chlorthalidone (HYGROTON) 25 MG tablet Take 1 tablet (25 mg) by mouth daily 60 tablet 0    dexAMETHasone (DECADRON) 4 MG tablet Take 2 tablets (8 mg) by mouth daily Take for 3 days, starting the day after chemo on day 2 and 9. Take with food. If no nausea and vomiting with first cisplatin doses, may stop dexamethasone. 12 tablet 2    naproxen (NAPROSYN) 500 MG tablet 1 tablet with food or milk as needed, do not take along with aleve, aspirin, ibuprofen or motrin Orally every 12 hrs for 15 days      vitamin D2 (ERGOCALCIFEROL) 36437 units (1250 mcg) capsule 1 capsule with meals Orally Once weekly for 90 days      chlorthalidone (HYGROTON) 25 MG tablet 1 tablet in the morning with food Orally      ondansetron (ZOFRAN) 8 MG tablet Take 1 tablet (8 mg) by mouth every 8 hours as needed for nausea (vomiting) 30 tablet 2    prochlorperazine (COMPAZINE) 10 MG tablet Take 1 tablet (10 mg) by mouth every 6 hours as needed for nausea or vomiting 30 tablet 2     No current facility-administered medications for this visit.      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 Continue treatment with cisplatin sensitizer 40mg/m2 IV weekly x5 weeks per Dr. Edward's plan, to be given in conjunction with pelvic radiation. Return to clinic on 4/29/24 for C1D8 cisplatin and labs. Reviewed alarm signs and symptoms and when to seek further care.   2) Pelvic pain: Worse with radiation. Low suspicion for an infection, likely secondary to cancer treatment. Continue Tylenol 1000mg BID. Discussed that I would prefer to avoid NSAIDs with her cisplatin- will trial oxycodone 5mg q6h PRN for severe pain that is not controlled with scheduled Tylenol. #10 ordered today, to be re-evaluated at her next clinic visit. Reviewed [place]  and reviewed safety with opioids, side effects, risks, and benefits. Has tolerated Percocet, low suspicion that she will have a reaction to oxycodone but reviewed s/sxs of this and when to seek further care.  3) Patient verbalized understanding of and agreement with plan    MDM:  28 minutes spent on date of service on visit, including chart review, face to face visit, documentation, and coordination of care.    ANDREW Brown, CNP (she/her)  Division of Gynecologic Oncology

## 2024-04-25 NOTE — NURSING NOTE
Is the patient currently in the state of [place]? YES    Visit mode:VIDEO    If the visit is dropped, the patient can be reconnected by: VIDEO VISIT: Text to cell phone:   Telephone Information:   Mobile [phone]       Will anyone else be joining the visit? NO  (If patient encounters technical issues they should call [phone]    How would you like to obtain your AVS? Mail a copy    Are changes needed to the allergy or medication list? No, Pt stated no changes to allergies, and Pt stated no med changes    Are refills needed on medications prescribed by this physician? NO    Reason for visit: MECHE [id number]

## 2024-04-26 ENCOUNTER — APPOINTMENT (OUTPATIENT)
Dept: RADIATION ONCOLOGY | Facility: CLINIC | Age: 54
End: 2024-04-26
Attending: RADIOLOGY
Payer: COMMERCIAL

## 2024-04-26 PROCEDURE — 77014 PR CT GUIDE FOR PLACEMENT RADIATION THERAPY FIELDS: CPT | Mod: 26 | Performed by: RADIOLOGY

## 2024-04-26 PROCEDURE — 77386 HC IMRT TREATMENT DELIVERY, COMPLEX: CPT | Performed by: RADIOLOGY

## 2024-04-26 PROCEDURE — 77336 RADIATION PHYSICS CONSULT: CPT | Performed by: RADIOLOGY

## 2024-04-26 PROCEDURE — 77427 RADIATION TX MANAGEMENT X5: CPT | Performed by: RADIOLOGY

## 2024-04-29 ENCOUNTER — LAB (OUTPATIENT)
Dept: LAB | Facility: CLINIC | Age: 54
End: 2024-04-29
Attending: OBSTETRICS & GYNECOLOGY
Payer: COMMERCIAL

## 2024-04-29 ENCOUNTER — APPOINTMENT (OUTPATIENT)
Dept: RADIATION ONCOLOGY | Facility: CLINIC | Age: 54
End: 2024-04-29
Attending: RADIOLOGY
Payer: COMMERCIAL

## 2024-04-29 ENCOUNTER — INFUSION THERAPY VISIT (OUTPATIENT)
Dept: ONCOLOGY | Facility: CLINIC | Age: 54
End: 2024-04-29
Attending: OBSTETRICS & GYNECOLOGY
Payer: COMMERCIAL

## 2024-04-29 VITALS
SYSTOLIC BLOOD PRESSURE: 110 MMHG | BODY MASS INDEX: 38.2 KG/M2 | DIASTOLIC BLOOD PRESSURE: 77 MMHG | OXYGEN SATURATION: 98 % | HEART RATE: 96 BPM | TEMPERATURE: 98.7 F | WEIGHT: 195.6 LBS | RESPIRATION RATE: 16 BRPM

## 2024-04-29 VITALS — BODY MASS INDEX: 38.08 KG/M2 | WEIGHT: 195 LBS

## 2024-04-29 DIAGNOSIS — Z51.11 ENCOUNTER FOR ANTINEOPLASTIC CHEMOTHERAPY: ICD-10-CM

## 2024-04-29 DIAGNOSIS — C53.1 MALIGNANT NEOPLASM OF EXOCERVIX (H): Primary | ICD-10-CM

## 2024-04-29 LAB
ALBUMIN SERPL BCG-MCNC: 3.7 G/DL (ref 3.5–5.2)
ALP SERPL-CCNC: 82 U/L (ref 40–150)
ALT SERPL W P-5'-P-CCNC: 10 U/L (ref 0–50)
ANION GAP SERPL CALCULATED.3IONS-SCNC: 12 MMOL/L (ref 7–15)
AST SERPL W P-5'-P-CCNC: 17 U/L (ref 0–45)
BASOPHILS # BLD AUTO: 0 10E3/UL (ref 0–0.2)
BASOPHILS NFR BLD AUTO: 0 %
BILIRUB SERPL-MCNC: 0.5 MG/DL
BUN SERPL-MCNC: 18.1 MG/DL (ref 6–20)
CALCIUM SERPL-MCNC: 9.5 MG/DL (ref 8.6–10)
CHLORIDE SERPL-SCNC: 95 MMOL/L (ref 98–107)
CREAT SERPL-MCNC: 0.69 MG/DL (ref 0.51–0.95)
DEPRECATED HCO3 PLAS-SCNC: 30 MMOL/L (ref 22–29)
EGFRCR SERPLBLD CKD-EPI 2021: >90 ML/MIN/1.73M2
EOSINOPHIL # BLD AUTO: 0.1 10E3/UL (ref 0–0.7)
EOSINOPHIL NFR BLD AUTO: 1 %
ERYTHROCYTE [DISTWIDTH] IN BLOOD BY AUTOMATED COUNT: 16.9 % (ref 10–15)
GLUCOSE SERPL-MCNC: 116 MG/DL (ref 70–99)
HCT VFR BLD AUTO: 35.9 % (ref 35–47)
HGB BLD-MCNC: 11.9 G/DL (ref 11.7–15.7)
IMM GRANULOCYTES # BLD: 0.1 10E3/UL
IMM GRANULOCYTES NFR BLD: 1 %
LYMPHOCYTES # BLD AUTO: 0.8 10E3/UL (ref 0.8–5.3)
LYMPHOCYTES NFR BLD AUTO: 11 %
MAGNESIUM SERPL-MCNC: 1.9 MG/DL (ref 1.7–2.3)
MCH RBC QN AUTO: 29.5 PG (ref 26.5–33)
MCHC RBC AUTO-ENTMCNC: 33.1 G/DL (ref 31.5–36.5)
MCV RBC AUTO: 89 FL (ref 78–100)
MONOCYTES # BLD AUTO: 0.5 10E3/UL (ref 0–1.3)
MONOCYTES NFR BLD AUTO: 6 %
NEUTROPHILS # BLD AUTO: 6 10E3/UL (ref 1.6–8.3)
NEUTROPHILS NFR BLD AUTO: 81 %
NRBC # BLD AUTO: 0 10E3/UL
NRBC BLD AUTO-RTO: 0 /100
PLATELET # BLD AUTO: 283 10E3/UL (ref 150–450)
POTASSIUM SERPL-SCNC: 3.2 MMOL/L (ref 3.4–5.3)
PROT SERPL-MCNC: 7.3 G/DL (ref 6.4–8.3)
RBC # BLD AUTO: 4.04 10E6/UL (ref 3.8–5.2)
SODIUM SERPL-SCNC: 137 MMOL/L (ref 135–145)
WBC # BLD AUTO: 7.5 10E3/UL (ref 4–11)

## 2024-04-29 PROCEDURE — 999N000127 HC STATISTIC PERIPHERAL IV START W US GUIDANCE

## 2024-04-29 PROCEDURE — 80053 COMPREHEN METABOLIC PANEL: CPT | Performed by: OBSTETRICS & GYNECOLOGY

## 2024-04-29 PROCEDURE — 96375 TX/PRO/DX INJ NEW DRUG ADDON: CPT

## 2024-04-29 PROCEDURE — 83735 ASSAY OF MAGNESIUM: CPT | Performed by: OBSTETRICS & GYNECOLOGY

## 2024-04-29 PROCEDURE — 96367 TX/PROPH/DG ADDL SEQ IV INF: CPT

## 2024-04-29 PROCEDURE — 36415 COLL VENOUS BLD VENIPUNCTURE: CPT | Performed by: OBSTETRICS & GYNECOLOGY

## 2024-04-29 PROCEDURE — 96413 CHEMO IV INFUSION 1 HR: CPT

## 2024-04-29 PROCEDURE — 85025 COMPLETE CBC W/AUTO DIFF WBC: CPT | Performed by: OBSTETRICS & GYNECOLOGY

## 2024-04-29 PROCEDURE — 999N000285 HC STATISTIC VASC ACCESS LAB DRAW WITH PIV START

## 2024-04-29 PROCEDURE — 77386 HC IMRT TREATMENT DELIVERY, COMPLEX: CPT | Performed by: RADIOLOGY

## 2024-04-29 PROCEDURE — 258N000003 HC RX IP 258 OP 636: Performed by: OBSTETRICS & GYNECOLOGY

## 2024-04-29 PROCEDURE — 250N000011 HC RX IP 250 OP 636: Performed by: OBSTETRICS & GYNECOLOGY

## 2024-04-29 PROCEDURE — 250N000013 HC RX MED GY IP 250 OP 250 PS 637: Performed by: OBSTETRICS & GYNECOLOGY

## 2024-04-29 PROCEDURE — 96361 HYDRATE IV INFUSION ADD-ON: CPT

## 2024-04-29 RX ORDER — DEXTROSE, SODIUM CHLORIDE, AND POTASSIUM CHLORIDE 5; .45; .15 G/100ML; G/100ML; G/100ML
2000 INJECTION INTRAVENOUS ONCE
Status: COMPLETED | OUTPATIENT
Start: 2024-04-29 | End: 2024-04-29

## 2024-04-29 RX ORDER — MULTIVITAMIN,THERAPEUTIC
1 TABLET ORAL DAILY
COMMUNITY

## 2024-04-29 RX ORDER — POTASSIUM CHLORIDE 1500 MG/1
40 TABLET, EXTENDED RELEASE ORAL ONCE
Status: COMPLETED | OUTPATIENT
Start: 2024-04-29 | End: 2024-04-29

## 2024-04-29 RX ORDER — PALONOSETRON 0.05 MG/ML
0.25 INJECTION, SOLUTION INTRAVENOUS ONCE
Status: COMPLETED | OUTPATIENT
Start: 2024-04-29 | End: 2024-04-29

## 2024-04-29 RX ORDER — ACETAMINOPHEN 500 MG
500-1000 TABLET ORAL EVERY 6 HOURS PRN
COMMUNITY

## 2024-04-29 RX ADMIN — PALONOSETRON HYDROCHLORIDE 0.25 MG: 0.25 INJECTION INTRAVENOUS at 08:09

## 2024-04-29 RX ADMIN — POTASSIUM CHLORIDE, DEXTROSE MONOHYDRATE AND SODIUM CHLORIDE 2000 ML: 150; 5; 450 INJECTION, SOLUTION INTRAVENOUS at 08:40

## 2024-04-29 RX ADMIN — POTASSIUM CHLORIDE 40 MEQ: 1500 TABLET, EXTENDED RELEASE ORAL at 09:42

## 2024-04-29 RX ADMIN — CISPLATIN 80 MG: 1 INJECTION, SOLUTION INTRAVENOUS at 09:49

## 2024-04-29 RX ADMIN — DEXAMETHASONE SODIUM PHOSPHATE: 10 INJECTION, SOLUTION INTRAMUSCULAR; INTRAVENOUS at 08:12

## 2024-04-29 ASSESSMENT — PAIN SCALES - GENERAL: PAINLEVEL: MODERATE PAIN (5)

## 2024-04-29 NOTE — PROGRESS NOTES
Infusion Nursing Note:  Michelle Mills presents today for cycle 1, day 8 cisplatin.    Patient seen by provider today: No   present during visit today: Not Applicable.    Note: Patient denies any fevers or chills. States she has been a little fatigued. Denies any nausea or vomiting. Patient has dexamethasone at home and was reminded to start taking it again tomorrow for the next 3 days. Having some pain 5/10 today. Taking tylenol and using hot packs prn. Declined any hot packs or intervention for pain while in infusion today.     4/29/2024 Written Communication: Jayda Calvo,FERNANDA/Glenis Goyal,RN  -Please replace potassium per protocol on top of the potassium she is getting in her IV fluids today     Intravenous Access:  Peripheral IV placed.    Treatment Conditions:  Lab Results   Component Value Date    HGB 11.9 04/29/2024    WBC 7.5 04/29/2024    ANEUTAUTO 6.0 04/29/2024     04/29/2024        Lab Results   Component Value Date     04/29/2024    POTASSIUM 3.2 (L) 04/29/2024    MAG 1.9 04/29/2024    CR 0.69 04/29/2024    IVON 9.5 04/29/2024    BILITOTAL 0.5 04/29/2024    ALBUMIN 3.7 04/29/2024    ALT 10 04/29/2024    AST 17 04/29/2024       Results reviewed, labs MET treatment parameters, ok to proceed with treatment.  Potassium replaced orally per protocol    Post Infusion Assessment:  Patient tolerated infusion without incident.  Blood return noted pre and post infusion.  Site patent and intact, free from redness, edema or discomfort.  No evidence of extravasations.  Access discontinued per protocol.       Discharge Plan:   Patient declined prescription refills.  Discharge instructions reviewed with: Patient.  Patient and/or family verbalized understanding of discharge instructions and all questions answered.  Copy of AVS reviewed with patient and/or family.  Patient will return 5/3 for next provider appointment and 5/6 for next infusion  Patient discharged in stable condition accompanied  by: self.  Departure Mode: Ambulatory.      Glenis Goyal RN

## 2024-04-29 NOTE — PATIENT INSTRUCTIONS
[facility] Triage and after hours / weekends / holidays:  [phone]    Please call the triage or after hours line if you experience a temperature greater than or equal to 100.4, shaking chills, have uncontrolled nausea, vomiting and/or diarrhea, dizziness, shortness of breath, chest pain, bleeding, unexplained bruising, or if you have any other new/concerning symptoms, questions or concerns.      If you are having any concerning symptoms or wish to speak to a provider before your next infusion visit, please call triage to notify them so we can adequately serve you.     If you need a refill on a narcotic prescription or other medication, please call before your infusion appointment.           April 2024 Sunday Monday Tuesday Wednesday Thursday Friday Saturday        1     2     3     4     5     6       7     8     9     10    RETURN RADIATION ONCOLOGY   1:30 PM   (30 min.)   Dede Henry MD   [facility] Radiation Oncology    CT SIM   2:00 PM   (60 min.)   Dede Henry MD   [facility] Radiation Oncology    CT THERAPY CORRELATE   2:45 PM   (20 min.)   [id number]   [facility] 11    RETURN CCSL   3:35 PM   (20 min.)   Katiuska Edward MD   [facility] 12     13       14    [place] PLANNING BILLING ONLY   7:00 AM   (30 min.)   Dede Henry MD   [facility] Radiation Oncology 15     16     17     18    RETURN CCSL   3:45 PM   (45 min.)   Jayda Calvo APRN CNP   [facility] 19    [place] PLANNING BILLING ONLY   7:00 AM   (30 min.)   Dede Henry MD   [facility] Radiation Oncology 20       21     22    LAB   1:30 PM   (15 min.)   [facility] LAB GOLD WAITING   [facility] [place]    ADVANCED TREATMENT   2:00 PM   (45 min.)   Dede Henry MD   [facility] Radiation Oncology    OTV   2:30 PM   (30 min.)   Dede Henry MD M  [facility] Radiation Oncology 23    ONC INFUSION 5 HR (300 MIN)   8:30 AM   (300 min.)   [facility] ONC INFUSION NURSE   [facility]    TREATMENT  10:30 AM   (45 min.)   [facility] RAD ONC VARIAN1   [facility] Radiation Oncology 24    TREATMENT   2:00 PM   (45 min.)   [facility] RAD ONC VARIAN1   [facility] Radiation Oncology 25    TREATMENT   2:00 PM   (45 min.)   [facility] RAD ONC VARIAN1   [facility] Radiation Oncology    RETURN CCSL   3:45 PM   (45 min.)   Jayda Calvo APRN CNP   [facility] 26    TREATMENT   2:00 PM   (45 min.)   [facility] RAD ONC VARIAN1   [facility] Radiation Oncology 27       28     29    LAB PERIPHERAL   7:15 AM   (15 min.)   [facility] DRAW   [facility]    ONC INFUSION 5 HR (300 MIN)   8:00 AM   (300 min.)   [facility] ONC INFUSION NURSE   [facility]    TREATMENT   2:00 PM   (45 min.)   [facility] RAD ONC VARIAN1   [facility] Radiation Oncology    OTV   2:30 PM   (30 min.)   Dede Henry MD   [facility] Radiation Oncology 30    TREATMENT   2:00 PM   (45 min.)   [facility] RAD ONC VARIAN1   [facility] Radiation Oncology                                 May 2024      Matteo Monday Tuesday Wednesday Thursday Friday Saturday                  1    TREATMENT   2:00 PM   (45 min.)   [facility] RAD ONC VARIAN1   [facility] Radiation Oncology 2    TREATMENT   2:00 PM   (45 min.)   [facility] RAD ONC VARIAN1   [facility] Radiation Oncology 3    TREATMENT   2:00 PM   (45 min.)   [facility] RAD ONC VARIAN1   [facility] Radiation Oncology    RETURN CCSL   2:45 PM   (45 min.)   Hannah Cervantes APRN CNP   [facility] 4       5     6    LAB PERIPHERAL   7:00 AM   (15 min.)   [facility] DRAW   [facility]    ONC INFUSION 5 HR (300 MIN)   7:30 AM   (300 min.)   E[facility]RA  ONC INFUSION NURSE   [facility]    TREATMENT   2:00 PM   (45 min.)   [facility] RAD ONC VARIAN1   [facility] Radiation Oncology    OTV   2:30 PM   (30 min.)   Dede Henry MD   [facility] Radiation Oncology 7    TREATMENT   2:00 PM   (45 min.)   [facility] RAD ONC VARIAN1   [facility] Radiation Oncology 8    TREATMENT   2:00 PM   (45 min.)   [facility] RAD ONC VARIAN1   [facility] Radiation Oncology 9    TREATMENT   2:00 PM   (45 min.)   [facility] RAD ONC VARIAN1   [facility] Radiation Oncology    RETURN CCSL   3:45 PM   (45 min.)   Jayda Calvo APRN CNP   [facility] 10    TREATMENT   2:00 PM   (45 min.)   [facility] RAD ONC VARIAN1   [facility] Radiation Oncology 11       12     13    LAB PERIPHERAL   6:30 AM   (15 min.)    [facility] LAB DRAW   [facility]    ONC INFUSION 5 HR (300 MIN)   7:00 AM   (300 min.)   [facility] ONC INFUSION NURSE   [facility]    TREATMENT   2:00 PM   (45 min.)   [facility] RAD ONC VARIAN1   [facility] Radiation Oncology 14    TREATMENT   2:00 PM   (45 min.)   [facility] RAD ONC VARIAN1   [facility]iation [facility]cology 15    TREATMENT   2:00 PM   (45 min.)   [facility] RAD ONC VARIAN1   [facility] Radiation Oncology    OTV   2:30 PM   (30 min.)   Dede Henry MD   [facility] Radiation Oncology 16    TREATMENT   2:00 PM   (45 min.)   [facility] RAD ONC VARIAN1   [facility] Radiation Oncology    RETURN CCSL   3:45 PM   (45 min.)   Jayda Calvo APRN CNP   [facility] 17    MR PELVIS (GYN) WWO CONTRAST   9:45 AM   (45 min.)   [id number]   [facility]    TREATMENT   2:00 PM   (45 min.)   [facility] RAD ONC VARIAN1   [facility] Radiation Oncology 18       19     20    LAB PERIPHERAL   7:00 AM   (15 min.)    [facility] DRAW   [facility]  [facility]    ONC INFUSION 5 HR (300 MIN)   7:30 AM   (300 min.)   UC ONC INFUSION NURSE   [facility]    TREATMENT   2:00 PM   (45 min.)   [facility] RAD ONC VARIAN[address] [place] [address] Radiation Oncology    OTV   2:30 PM   (30 min.)   Dede Henry MD   [facility] Radiation Oncology 21    EXAM UNDER ANESTHESIA, GYNECOLOGIC, WITH INSERTION OF KENDY SLEEVE, UTERINE RING, AND TANDEM, WITH ULTRASOUND GUIDANCE   8:00 AM   Dede Henry MD   [facility] OR    TREATMENT   2:00 PM   (45 min.)   [facility] RAD ONC [address] Radiation Oncology 22    TREATMENT   2:00 PM   (45 min.)   [facility] RAD ONC [address] Radiation Oncology 23    TREATMENT   2:00 PM   (45 min.)   [facility] RAD ONC VARIAN[address] [place] [address] Radiation Oncology 24    TREATMENT   2:00 PM   (45 min.)   [facility] RAD ONC [address] Radiation Oncology 25       26     27     28     29    MR PELVIS (GYN) WO CONTRAST   7:30 AM   (45 min.)   [address] Imaging    ANESTHESIA OUT OF OR RAD THERAPY (30)   7:30 AM   GENERIC ANESTHESIA PROVIDER   [facility] 30     31    MR LIMITED SCAN  10:30 AM   (45 min.)   [address] Imaging    ANESTHESIA OUT OF OR RAD THERAPY (30)  10:30 AM   GENERIC ANESTHESIA PROVIDER   [facility]                     Recent Results (from the past 24 hour(s))   Comprehensive metabolic panel    Collection Time: 04/29/24  7:39 AM   Result Value Ref Range    Sodium 137 135 - 145 mmol/L    Potassium 3.2 (L) 3.4 - 5.3 mmol/L    Carbon Dioxide (CO2) 30 (H) 22 - 29 mmol/L    Anion Gap 12 7 - 15 mmol/L    Urea Nitrogen 18.1 6.0 - 20.0 mg/dL    Creatinine 0.69 0.51 - 0.95 mg/dL    GFR Estimate >90 >60 mL/min/1.73m2    Calcium 9.5 8.6 - 10.0 mg/dL    Chloride 95 (L) 98 - 107 mmol/L    Glucose 116 (H) 70 - 99 mg/dL    Alkaline Phosphatase 82 40 - 150 U/L    AST 17 0 - 45 U/L    ALT 10 0 - 50 U/L    Protein Total 7.3 6.4 -  8.3 g/dL    Albumin 3.7 3.5 - 5.2 g/dL    Bilirubin Total 0.5 <=1.2 mg/dL   Magnesium    Collection Time: 04/29/24  7:39 AM   Result Value Ref Range    Magnesium 1.9 1.7 - 2.3 mg/dL   CBC with platelets and differential    Collection Time: 04/29/24  7:39 AM   Result Value Ref Range    WBC Count 7.5 4.0 - 11.0 10e3/uL    RBC Count 4.04 3.80 - 5.20 10e6/uL    Hemoglobin 11.9 11.7 - 15.7 g/dL    Hematocrit 35.9 35.0 - 47.0 %    MCV 89 78 - 100 fL    MCH 29.5 26.5 - 33.0 pg    MCHC 33.1 31.5 - 36.5 g/dL    RDW 16.9 (H) 10.0 - 15.0 %    Platelet Count 283 150 - 450 10e3/uL    % Neutrophils 81 %    % Lymphocytes 11 %    % Monocytes 6 %    % Eosinophils 1 %    % Basophils 0 %    % Immature Granulocytes 1 %    NRBCs per 100 WBC 0 <1 /100    Absolute Neutrophils 6.0 1.6 - 8.3 10e3/uL    Absolute Lymphocytes 0.8 0.8 - 5.3 10e3/uL    Absolute Monocytes 0.5 0.0 - 1.3 10e3/uL    Absolute Eosinophils 0.1 0.0 - 0.7 10e3/uL    Absolute Basophils 0.0 0.0 - 0.2 10e3/uL    Absolute Immature Granulocytes 0.1 <=0.4 10e3/uL    Absolute NRBCs 0.0 10e3/uL

## 2024-04-29 NOTE — PROGRESS NOTES
RADIATION ONCOLOGY WEEKLY ON TREATMENT VISIT   Encounter Date: 2024    Patient Name: Michelle Mills  MRN: [id number]  : 1970     Disease and Stage: Squamous cell carcinoma of the cervix, FIGO IB3   Treatment Site: Pelvis  Current Dose/Planned Total Dose: [1080] cGy / [4500] cGy followed by HDR brachytherapy    Concurrent Chemotherapy: Yes  Drug and Frequency: Weekly cisplatin    Gynecologic Oncologist: Katiuska Edward MD    Subjective: Ms. Mills presents to clinic today for her weekly on-treatment visit. She is tolerating radiation well. She is s/p her 2nd cycle of chemotherapy.    She reports no significant side effects currently. She is having a band like sensation of pain in her lower abdomen. She had some discharge recently, bloody with clots, only when going to the bathroom. She is not requiring a pad currently. She is taking Tylenol for pain     PEG Tube: No  Electronic Cardiac Implant: No     Objective:   Wt 88.5 kg (195 lb)   BMI 38.08 kg/m    /75   Pulse 82   Wt 87.1 kg (192 lb)   BMI 37.69 kg/m    Gen: Appears well, NAD  Abdomen: Nondistended  Skin: No erythema    Laboratory:  Lab Results   Component Value Date    WBC 7.5 2024    HGB 11.9 2024    HCT 35.9 2024    MCV 89 2024     2024     Lab Results   Component Value Date    CR 0.69 2024     Lab Results   Component Value Date     2024    POTASSIUM 3.2 (L) 2024    CHLORIDE 95 (L) 2024    CO2 30 (H) 2024     (H) 2024     Lab Results   Component Value Date    AST 17 2024    ALT 10 2024    ALKPHOS 82 2024    BILITOTAL 0.5 2024     Magnesium   Date Value Ref Range Status   2024 1.9 1.7 - 2.3 mg/dL Final     Treatment-related toxicities (CTCAE v5.0):  Anorexia: Grade 0: No toxicity  Fatigue: Grade 1: Fatigue relieved by rest  Nausea: Grade 0: No toxicity  Pain: Grade 1: Mild pain  Diarrhea: Grade 0: No toxicity  Urinary  frequency: Grade 0: No toxicity  Urinary tract pain: Grade 0: No toxicity  Urinary urgency: Grade 0: No toxicity  Dermatitis: Grade 0: No toxicity    ED visits/Hospitalizations:  None    Missed Treatments:  None    Mosaiq chart and setup information reviewed  IGRT images reviewed    Assessment:    Ms. Mills is a 53 year old female with a squamous cell carcinoma of the cervix, FIGO IB3, with tumor size greater than 4 cm, who is receiving definitive chemoradiation. She is tolerating radiation well.    Plan:   Continue radiation as planned  Discussed imodium, recommended the patient purchase loperamide  Reviewed skin hygiene, moisturizer  Patient's pain is controlled with Tylenol alone, discussed options for additional pain control but patient will defer for now  Vaginal discharge is expected with early chemoRT, will ctm    Paola Oneal MD     Physician Attestation  Ms. Mills was seen and examined by me. I agree with the findings and plan of care as documented in the note. Note above by Dr. Oneal was reviewed and edited by me and reflects our mutual findings and plan of care.  I personally reviewed the available treatment setup images and vital signs listed.     Dede Henry MD  Department of Radiation Oncology  [facility]

## 2024-04-29 NOTE — LETTER
2024         RE: Michelle Mills  [address]  [place] [address]        Dear Colleague,    Thank you for referring your patient, Michelle Mills, to the [facility] RADIATION ONCOLOGY. Please see a copy of my visit note below.    RADIATION ONCOLOGY WEEKLY ON TREATMENT VISIT   Encounter Date: 2024    Patient Name: Michelle Mills  MRN: [id number]  : 1970     Disease and Stage: Squamous cell carcinoma of the cervix, FIGO IB3   Treatment Site: Pelvis  Current Dose/Planned Total Dose: [1080] cGy / [4500] cGy followed by HDR brachytherapy    Concurrent Chemotherapy: Yes  Drug and Frequency: Weekly cisplatin    Gynecologic Oncologist: Katiuska Edward MD    Subjective: Ms. Mills presents to clinic today for her weekly on-treatment visit. She is tolerating radiation well. She is s/p her 2nd cycle of chemotherapy.    She reports no significant side effects currently. She is having a band like sensation of pain in her lower abdomen. She had some discharge recently, bloody with clots, only when going to the bathroom. She is not requiring a pad currently. She is taking Tylenol for pain     PEG Tube: No  Electronic Cardiac Implant: No     Objective:   Wt 88.5 kg (195 lb)   BMI 38.08 kg/m    /75   Pulse 82   Wt 87.1 kg (192 lb)   BMI 37.69 kg/m    Gen: Appears well, NAD  Abdomen: Nondistended  Skin: No erythema    Laboratory:  Lab Results   Component Value Date    WBC 7.5 2024    HGB 11.9 2024    HCT 35.9 2024    MCV 89 2024     2024     Lab Results   Component Value Date    CR 0.69 2024     Lab Results   Component Value Date     2024    POTASSIUM 3.2 (L) 2024    CHLORIDE 95 (L) 2024    CO2 30 (H) 2024     (H) 2024     Lab Results   Component Value Date    AST 17 2024    ALT 10 2024    ALKPHOS 82 2024    BILITOTAL 0.5 2024     Magnesium   Date Value Ref Range Status    04/29/2024 1.9 1.7 - 2.3 mg/dL Final     Treatment-related toxicities (CTCAE v5.0):  Anorexia: Grade 0: No toxicity  Fatigue: Grade 1: Fatigue relieved by rest  Nausea: Grade 0: No toxicity  Pain: Grade 1: Mild pain  Diarrhea: Grade 0: No toxicity  Urinary frequency: Grade 0: No toxicity  Urinary tract pain: Grade 0: No toxicity  Urinary urgency: Grade 0: No toxicity  Dermatitis: Grade 0: No toxicity    ED visits/Hospitalizations:  None    Missed Treatments:  None    Mosaiq chart and setup information reviewed  IGRT images reviewed    Assessment:    Ms. Mills is a 53 year old female with a squamous cell carcinoma of the cervix, FIGO IB3, with tumor size greater than 4 cm, who is receiving definitive chemoradiation. She is tolerating radiation well.    Plan:   Continue radiation as planned  Discussed imodium, recommended the patient purchase loperamide  Reviewed skin hygiene, moisturizer  Patient's pain is controlled with Tylenol alone, discussed options for additional pain control but patient will defer for now  Vaginal discharge is expected with early chemoRT, will ctm    Paola Oneal MD     Physician Attestation  Ms. Mills was seen and examined by me. I agree with the findings and plan of care as documented in the note. Note above by Dr. Oneal was reviewed and edited by me and reflects our mutual findings and plan of care.  I personally reviewed the available treatment setup images and vital signs listed.     Dede Henry MD  Department of Radiation Oncology  [facility]

## 2024-04-29 NOTE — NURSING NOTE
Chief Complaint   Patient presents with    Blood Draw     Labs drawn via PIV placed by Vascular Access Team in lab     Labs drawn from PIV placed by Vascular Access Team. Line flushed with saline. Vitals taken. Pt checked in for appointment(s).     Jayda Florian RN

## 2024-04-30 ENCOUNTER — APPOINTMENT (OUTPATIENT)
Dept: RADIATION ONCOLOGY | Facility: CLINIC | Age: 54
End: 2024-04-30
Attending: RADIOLOGY
Payer: COMMERCIAL

## 2024-04-30 PROCEDURE — 77386 HC IMRT TREATMENT DELIVERY, COMPLEX: CPT | Performed by: RADIOLOGY

## 2024-04-30 PROCEDURE — 77014 PR CT GUIDE FOR PLACEMENT RADIATION THERAPY FIELDS: CPT | Mod: 26 | Performed by: RADIOLOGY

## 2024-05-01 ENCOUNTER — APPOINTMENT (OUTPATIENT)
Dept: RADIATION ONCOLOGY | Facility: CLINIC | Age: 54
End: 2024-05-01
Attending: RADIOLOGY
Payer: COMMERCIAL

## 2024-05-01 PROCEDURE — 77386 HC IMRT TREATMENT DELIVERY, COMPLEX: CPT | Performed by: RADIOLOGY

## 2024-05-01 PROCEDURE — 77014 PR CT GUIDE FOR PLACEMENT RADIATION THERAPY FIELDS: CPT | Mod: 26 | Performed by: RADIOLOGY

## 2024-05-02 ENCOUNTER — APPOINTMENT (OUTPATIENT)
Dept: RADIATION ONCOLOGY | Facility: CLINIC | Age: 54
End: 2024-05-02
Attending: RADIOLOGY
Payer: COMMERCIAL

## 2024-05-02 PROCEDURE — 77014 PR CT GUIDE FOR PLACEMENT RADIATION THERAPY FIELDS: CPT | Mod: 26 | Performed by: RADIOLOGY

## 2024-05-02 PROCEDURE — 77386 HC IMRT TREATMENT DELIVERY, COMPLEX: CPT | Performed by: RADIOLOGY

## 2024-05-03 ENCOUNTER — APPOINTMENT (OUTPATIENT)
Dept: RADIATION ONCOLOGY | Facility: CLINIC | Age: 54
End: 2024-05-03
Attending: RADIOLOGY
Payer: COMMERCIAL

## 2024-05-03 ENCOUNTER — TELEPHONE (OUTPATIENT)
Dept: ONCOLOGY | Facility: CLINIC | Age: 54
End: 2024-05-03
Payer: COMMERCIAL

## 2024-05-03 PROCEDURE — 77014 PR CT GUIDE FOR PLACEMENT RADIATION THERAPY FIELDS: CPT | Mod: 26 | Performed by: RADIOLOGY

## 2024-05-03 PROCEDURE — 77427 RADIATION TX MANAGEMENT X5: CPT | Mod: GC | Performed by: RADIOLOGY

## 2024-05-03 PROCEDURE — 77386 HC IMRT TREATMENT DELIVERY, COMPLEX: CPT | Performed by: RADIOLOGY

## 2024-05-03 PROCEDURE — 77336 RADIATION PHYSICS CONSULT: CPT | Performed by: RADIOLOGY

## 2024-05-03 NOTE — TELEPHONE ENCOUNTER
Patient needs to be rescheduled for their virtual visit due to Reason for Reschedule: Patient Request  Called to check patient in for appt and she stated she needs to reschedule.    Appointment mode: Video  Provider: ANDREW Rodrigues/OLY Sullivan/YOLANDEN

## 2024-05-06 ENCOUNTER — APPOINTMENT (OUTPATIENT)
Dept: RADIATION ONCOLOGY | Facility: CLINIC | Age: 54
End: 2024-05-06
Attending: RADIOLOGY
Payer: COMMERCIAL

## 2024-05-06 ENCOUNTER — APPOINTMENT (OUTPATIENT)
Dept: LAB | Facility: CLINIC | Age: 54
End: 2024-05-06
Attending: OBSTETRICS & GYNECOLOGY
Payer: COMMERCIAL

## 2024-05-06 ENCOUNTER — INFUSION THERAPY VISIT (OUTPATIENT)
Dept: ONCOLOGY | Facility: CLINIC | Age: 54
End: 2024-05-06
Attending: OBSTETRICS & GYNECOLOGY
Payer: COMMERCIAL

## 2024-05-06 VITALS
DIASTOLIC BLOOD PRESSURE: 81 MMHG | WEIGHT: 196.4 LBS | SYSTOLIC BLOOD PRESSURE: 115 MMHG | RESPIRATION RATE: 16 BRPM | HEART RATE: 97 BPM | TEMPERATURE: 98.4 F | OXYGEN SATURATION: 99 % | BODY MASS INDEX: 38.36 KG/M2

## 2024-05-06 DIAGNOSIS — C53.1 MALIGNANT NEOPLASM OF EXOCERVIX (H): Primary | ICD-10-CM

## 2024-05-06 DIAGNOSIS — Z51.11 ENCOUNTER FOR ANTINEOPLASTIC CHEMOTHERAPY: ICD-10-CM

## 2024-05-06 LAB
ALBUMIN SERPL BCG-MCNC: 3.6 G/DL (ref 3.5–5.2)
ALP SERPL-CCNC: 67 U/L (ref 40–150)
ALT SERPL W P-5'-P-CCNC: 17 U/L (ref 0–50)
ANION GAP SERPL CALCULATED.3IONS-SCNC: 12 MMOL/L (ref 7–15)
AST SERPL W P-5'-P-CCNC: 15 U/L (ref 0–45)
BASOPHILS # BLD AUTO: 0 10E3/UL (ref 0–0.2)
BASOPHILS NFR BLD AUTO: 0 %
BILIRUB SERPL-MCNC: 0.4 MG/DL
BUN SERPL-MCNC: 15.6 MG/DL (ref 6–20)
CALCIUM SERPL-MCNC: 9.3 MG/DL (ref 8.6–10)
CHLORIDE SERPL-SCNC: 97 MMOL/L (ref 98–107)
CREAT SERPL-MCNC: 0.65 MG/DL (ref 0.51–0.95)
DEPRECATED HCO3 PLAS-SCNC: 29 MMOL/L (ref 22–29)
EGFRCR SERPLBLD CKD-EPI 2021: >90 ML/MIN/1.73M2
EOSINOPHIL # BLD AUTO: 0.1 10E3/UL (ref 0–0.7)
EOSINOPHIL NFR BLD AUTO: 1 %
ERYTHROCYTE [DISTWIDTH] IN BLOOD BY AUTOMATED COUNT: 17.8 % (ref 10–15)
GLUCOSE SERPL-MCNC: 134 MG/DL (ref 70–99)
HCT VFR BLD AUTO: 32.3 % (ref 35–47)
HGB BLD-MCNC: 10.7 G/DL (ref 11.7–15.7)
IMM GRANULOCYTES # BLD: 0.1 10E3/UL
IMM GRANULOCYTES NFR BLD: 1 %
LYMPHOCYTES # BLD AUTO: 0.4 10E3/UL (ref 0.8–5.3)
LYMPHOCYTES NFR BLD AUTO: 5 %
MAGNESIUM SERPL-MCNC: 1.5 MG/DL (ref 1.7–2.3)
MCH RBC QN AUTO: 29.5 PG (ref 26.5–33)
MCHC RBC AUTO-ENTMCNC: 33.1 G/DL (ref 31.5–36.5)
MCV RBC AUTO: 89 FL (ref 78–100)
MONOCYTES # BLD AUTO: 0.5 10E3/UL (ref 0–1.3)
MONOCYTES NFR BLD AUTO: 6 %
NEUTROPHILS # BLD AUTO: 6.8 10E3/UL (ref 1.6–8.3)
NEUTROPHILS NFR BLD AUTO: 87 %
NRBC # BLD AUTO: 0 10E3/UL
NRBC BLD AUTO-RTO: 0 /100
PLATELET # BLD AUTO: 203 10E3/UL (ref 150–450)
POTASSIUM SERPL-SCNC: 3.6 MMOL/L (ref 3.4–5.3)
PROT SERPL-MCNC: 6.8 G/DL (ref 6.4–8.3)
RBC # BLD AUTO: 3.63 10E6/UL (ref 3.8–5.2)
SODIUM SERPL-SCNC: 138 MMOL/L (ref 135–145)
WBC # BLD AUTO: 7.9 10E3/UL (ref 4–11)

## 2024-05-06 PROCEDURE — 83735 ASSAY OF MAGNESIUM: CPT | Performed by: OBSTETRICS & GYNECOLOGY

## 2024-05-06 PROCEDURE — 999N000248 HC STATISTIC IV INSERT WITH US BY RN

## 2024-05-06 PROCEDURE — 80053 COMPREHEN METABOLIC PANEL: CPT | Performed by: OBSTETRICS & GYNECOLOGY

## 2024-05-06 PROCEDURE — 96361 HYDRATE IV INFUSION ADD-ON: CPT

## 2024-05-06 PROCEDURE — 258N000003 HC RX IP 258 OP 636: Performed by: OBSTETRICS & GYNECOLOGY

## 2024-05-06 PROCEDURE — 96413 CHEMO IV INFUSION 1 HR: CPT

## 2024-05-06 PROCEDURE — 250N000011 HC RX IP 250 OP 636: Performed by: OBSTETRICS & GYNECOLOGY

## 2024-05-06 PROCEDURE — 250N000013 HC RX MED GY IP 250 OP 250 PS 637: Performed by: NURSE PRACTITIONER

## 2024-05-06 PROCEDURE — 77386 HC IMRT TREATMENT DELIVERY, COMPLEX: CPT | Performed by: RADIOLOGY

## 2024-05-06 PROCEDURE — 96367 TX/PROPH/DG ADDL SEQ IV INF: CPT

## 2024-05-06 PROCEDURE — 85004 AUTOMATED DIFF WBC COUNT: CPT | Performed by: OBSTETRICS & GYNECOLOGY

## 2024-05-06 PROCEDURE — 36415 COLL VENOUS BLD VENIPUNCTURE: CPT | Performed by: OBSTETRICS & GYNECOLOGY

## 2024-05-06 RX ORDER — ACETAMINOPHEN 500 MG
500 TABLET ORAL ONCE
Status: COMPLETED | OUTPATIENT
Start: 2024-05-06 | End: 2024-05-06

## 2024-05-06 RX ORDER — PALONOSETRON 0.05 MG/ML
0.25 INJECTION, SOLUTION INTRAVENOUS ONCE
Status: COMPLETED | OUTPATIENT
Start: 2024-05-06 | End: 2024-05-06

## 2024-05-06 RX ORDER — DEXTROSE, SODIUM CHLORIDE, AND POTASSIUM CHLORIDE 5; .45; .15 G/100ML; G/100ML; G/100ML
2000 INJECTION INTRAVENOUS ONCE
Status: COMPLETED | OUTPATIENT
Start: 2024-05-06 | End: 2024-05-06

## 2024-05-06 RX ORDER — MAGNESIUM SULFATE HEPTAHYDRATE 40 MG/ML
2 INJECTION, SOLUTION INTRAVENOUS ONCE
Status: COMPLETED | OUTPATIENT
Start: 2024-05-06 | End: 2024-05-06

## 2024-05-06 RX ADMIN — CISPLATIN 80 MG: 1 INJECTION, SOLUTION INTRAVENOUS at 10:33

## 2024-05-06 RX ADMIN — ACETAMINOPHEN 500 MG: 500 TABLET ORAL at 14:06

## 2024-05-06 RX ADMIN — PALONOSETRON HYDROCHLORIDE 0.25 MG: 0.25 INJECTION INTRAVENOUS at 09:51

## 2024-05-06 RX ADMIN — ACETAMINOPHEN 500 MG: 500 TABLET ORAL at 09:04

## 2024-05-06 RX ADMIN — DEXAMETHASONE SODIUM PHOSPHATE: 10 INJECTION, SOLUTION INTRAMUSCULAR; INTRAVENOUS at 09:47

## 2024-05-06 RX ADMIN — MAGNESIUM SULFATE 2 G: 2 INJECTION INTRAVENOUS at 08:51

## 2024-05-06 RX ADMIN — POTASSIUM CHLORIDE, DEXTROSE MONOHYDRATE AND SODIUM CHLORIDE 2000 ML: 150; 5; 450 INJECTION, SOLUTION INTRAVENOUS at 08:50

## 2024-05-06 ASSESSMENT — PAIN SCALES - GENERAL: PAINLEVEL: WORST PAIN (10)

## 2024-05-06 NOTE — PATIENT INSTRUCTIONS
[facility] Triage and after hours / weekends / holidays:  [phone] option 5, option 2    Please call the triage or after hours line if you experience a temperature greater than or equal to 100.4, shaking chills, have uncontrolled nausea, vomiting and/or diarrhea, dizziness, shortness of breath, chest pain, bleeding, unexplained bruising, or if you have any other new/concerning symptoms, questions or concerns.      If you are having any concerning symptoms or wish to speak to a provider before your next infusion visit, please call triage to notify your care team so we can adequately serve you.     If you need a refill on a narcotic prescription or other medication, please call before your infusion appointment.

## 2024-05-06 NOTE — PROGRESS NOTES
RADIATION ONCOLOGY WEEKLY ON TREATMENT VISIT   Encounter Date: May 6, 2024    Patient Name: Michelle Mills  MRN: [id number]  : 1970     Disease and Stage: Squamous cell carcinoma of the cervix, FIGO IB3   Treatment Site: Pelvis  Current Dose/Planned Total Dose: [1980] cGy / [4500] cGy followed by HDR brachytherapy    Concurrent Chemotherapy: Yes  Drug and Frequency: Weekly cisplatin    Gynecologic Oncologist: Katiuska Edward MD    Subjective: Ms. Mills presents to clinic today for her weekly on-treatment visit.  She is having pain with defecation which she describes as sharp and stabbing.  She is not having any bleeding when she wipes.  While she has had diarrheal stools the past few days, she is currently not having diarrhea and she is not constipated.  She is also having some burning with urination.  Preparation H is not helpful for perianal pain.    Previous OTV's  She is tolerating radiation well. She is s/p her 2nd cycle of chemotherapy.    She reports no significant side effects currently. She is having a band like sensation of pain in her lower abdomen. She had some discharge recently, bloody with clots, only when going to the bathroom. She is not requiring a pad currently. She is taking Tylenol for pain     PEG Tube: No  Electronic Cardiac Implant: No     Objective:   There were no vitals taken for this visit.  /75   Pulse 82   Wt 87.1 kg (192 lb)   BMI 37.69 kg/m    Gen: Appears well, NAD  Abdomen: Nondistended  Skin: No erythema    Laboratory:  Lab Results   Component Value Date    WBC 7.9 2024    HGB 10.7 (L) 2024    HCT 32.3 (L) 2024    MCV 89 2024     2024     Lab Results   Component Value Date    CR 0.65 2024     Lab Results   Component Value Date     2024    POTASSIUM 3.6 2024    CHLORIDE 97 (L) 2024    CO2 29 2024     (H) 2024     Lab Results   Component Value Date    AST 15 2024    ALT 17  05/06/2024    ALKPHOS 67 05/06/2024    BILITOTAL 0.4 05/06/2024     Magnesium   Date Value Ref Range Status   05/06/2024 1.5 (L) 1.7 - 2.3 mg/dL Final     Treatment-related toxicities (CTCAE v5.0):  Anorexia: Grade 0: No toxicity  Fatigue: Grade 1: Fatigue relieved by rest  Nausea: Grade 0: No toxicity  Pain: Grade 2: Moderate pain; limiting instrumental ADL  Diarrhea: Grade 1: Increase of <4 stools per day over baseline; mild increase in ostomy output compared to baseline  Urinary frequency: Grade 1: Present  Urinary tract pain: Grade 1: Mild pain  Urinary urgency: Grade 0: No toxicity  Dermatitis: Grade 0: No toxicity    ED visits/Hospitalizations:  None    Missed Treatments:  None    Mosaiq chart and setup information reviewed  IGRT images reviewed    Assessment:    Ms. Mills is a 53 year old female with a squamous cell carcinoma of the cervix, FIGO IB3, with tumor size greater than 4 cm, who is receiving definitive chemoradiation. She is tolerating radiation well.    Plan:   Continue radiation as planned  Discussed imodium, recommended the patient purchase loperamide  Reviewed skin hygiene, moisturizer  Discussed management of perianal pain which may be due to from either hemorrhoids or anal fissure.  Recommend Tucks wipes and barrier cream with either dimethicone or Cavilon (can be purchased online).  Patient was also given a squirt bottle to aid with urination burning.  Vaginal discharge is expected with early chemoRT, will ilir Henry MD  Department of Radiation Oncology  [facility]

## 2024-05-06 NOTE — LETTER
2024         RE: Michelle Mills  [address]  [facility] [address]        Dear Colleague,    Thank you for referring your patient, Michelle Mills, to the [facility] RADIATION ONCOLOGY. Please see a copy of my visit note below.    RADIATION ONCOLOGY WEEKLY ON TREATMENT VISIT   Encounter Date: May 6, 2024    Patient Name: Michelle Mills  MRN: [id number]  : 1970     Disease and Stage: Squamous cell carcinoma of the cervix, FIGO IB3   Treatment Site: Pelvis  Current Dose/Planned Total Dose: [1980] cGy / [4500] cGy followed by HDR brachytherapy    Concurrent Chemotherapy: Yes  Drug and Frequency: Weekly cisplatin    Gynecologic Oncologist: Katiuska Edward MD    Subjective: Ms. Mills presents to clinic today for her weekly on-treatment visit.  She is having pain with defecation which she describes as sharp and stabbing.  She is not having any bleeding when she wipes.  While she has had diarrheal stools the past few days, she is currently not having diarrhea and she is not constipated.  She is also having some burning with urination.  Preparation H is not helpful for perianal pain.    Previous OTV's  She is tolerating radiation well. She is s/p her 2nd cycle of chemotherapy.    She reports no significant side effects currently. She is having a band like sensation of pain in her lower abdomen. She had some discharge recently, bloody with clots, only when going to the bathroom. She is not requiring a pad currently. She is taking Tylenol for pain     PEG Tube: No  Electronic Cardiac Implant: No     Objective:   There were no vitals taken for this visit.  /75   Pulse 82   Wt 87.1 kg (192 lb)   BMI 37.69 kg/m    Gen: Appears well, NAD  Abdomen: Nondistended  Skin: No erythema    Laboratory:  Lab Results   Component Value Date    WBC 7.9 2024    HGB 10.7 (L) 2024    HCT 32.3 (L) 2024    MCV 89 2024     2024     Lab Results   Component Value Date    CR  0.65 05/06/2024     Lab Results   Component Value Date     05/06/2024    POTASSIUM 3.6 05/06/2024    CHLORIDE 97 (L) 05/06/2024    CO2 29 05/06/2024     (H) 05/06/2024     Lab Results   Component Value Date    AST 15 05/06/2024    ALT 17 05/06/2024    ALKPHOS 67 05/06/2024    BILITOTAL 0.4 05/06/2024     Magnesium   Date Value Ref Range Status   05/06/2024 1.5 (L) 1.7 - 2.3 mg/dL Final     Treatment-related toxicities (CTCAE v5.0):  Anorexia: Grade 0: No toxicity  Fatigue: Grade 1: Fatigue relieved by rest  Nausea: Grade 0: No toxicity  Pain: Grade 2: Moderate pain; limiting instrumental ADL  Diarrhea: Grade 1: Increase of <4 stools per day over baseline; mild increase in ostomy output compared to baseline  Urinary frequency: Grade 1: Present  Urinary tract pain: Grade 1: Mild pain  Urinary urgency: Grade 0: No toxicity  Dermatitis: Grade 0: No toxicity    ED visits/Hospitalizations:  None    Missed Treatments:  None    Mosaiq chart and setup information reviewed  IGRT images reviewed    Assessment:    Ms. Mills is a 53 year old female with a squamous cell carcinoma of the cervix, FIGO IB3, with tumor size greater than 4 cm, who is receiving definitive chemoradiation. She is tolerating radiation well.    Plan:   Continue radiation as planned  Discussed imodium, recommended the patient purchase loperamide  Reviewed skin hygiene, moisturizer  Discussed management of perianal pain which may be due to from either hemorrhoids or anal fissure.  Recommend Tucks wipes and barrier cream with either dimethicone or Cavilon (can be purchased online).  Patient was also given a squirt bottle to aid with urination burning.  Vaginal discharge is expected with early chemoRT, will ilir Henry MD  Department of Radiation Oncology  [facility]

## 2024-05-06 NOTE — PROGRESS NOTES
Infusion Nursing Note:  Michelle Mills presents today for C1D15 Cisplatin.  Patient seen by provider today: No   present during visit today: Not Applicable.    Note: Michelle denied fevers, chills, cough, SOB, and chest pain. She is eating and drinking well. Denied any nausea/vomiting.    She is having extreme discomfort of her perineum due to radiation. Reported it feels like coals. Burning with urination and bowel movements. She has been trying to avoid frequently using the bathroom d/t discomfort. Encouraged patient to discuss this with radiation later today. Provided patient with alina bottle today.    Requested tylenol dose during infusion as she forgot her dose at home.    Per written communication with Jayda Calvo CNP/Amena Morrow RN 05/06/24 @ 0839  - monie to give 500 mg tylenol    Magnesium 1.5 today. Replaced via IV per protocol.    Voided prior to and during cisplatin infusion.    Intravenous Access:  Peripheral IV placed.    Treatment Conditions:   Latest Reference Range & Units 05/06/24 08:00   Sodium 135 - 145 mmol/L 138   Potassium 3.4 - 5.3 mmol/L 3.6   Chloride 98 - 107 mmol/L 97 (L)   Carbon Dioxide (CO2) 22 - 29 mmol/L 29   Urea Nitrogen 6.0 - 20.0 mg/dL 15.6   Creatinine 0.51 - 0.95 mg/dL 0.65   GFR Estimate >60 mL/min/1.73m2 >90   Calcium 8.6 - 10.0 mg/dL 9.3   Anion Gap 7 - 15 mmol/L 12   Magnesium 1.7 - 2.3 mg/dL 1.5 (L)   Albumin 3.5 - 5.2 g/dL 3.6   Protein Total 6.4 - 8.3 g/dL 6.8   Alkaline Phosphatase 40 - 150 U/L 67   ALT 0 - 50 U/L 17   AST 0 - 45 U/L 15   Bilirubin Total <=1.2 mg/dL 0.4   Glucose 70 - 99 mg/dL 134 (H)   WBC 4.0 - 11.0 10e3/uL 7.9   Hemoglobin 11.7 - 15.7 g/dL 10.7 (L)   Hematocrit 35.0 - 47.0 % 32.3 (L)   Platelet Count 150 - 450 10e3/uL 203   RBC Count 3.80 - 5.20 10e6/uL 3.63 (L)   MCV 78 - 100 fL 89   MCH 26.5 - 33.0 pg 29.5   MCHC 31.5 - 36.5 g/dL 33.1   RDW 10.0 - 15.0 % 17.8 (H)   % Neutrophils % 87   % Lymphocytes % 5   % Monocytes % 6   %  Eosinophils % 1   % Basophils % 0   Absolute Basophils 0.0 - 0.2 10e3/uL 0.0   Absolute Eosinophils 0.0 - 0.7 10e3/uL 0.1   Absolute Immature Granulocytes <=0.4 10e3/uL 0.1   Absolute Lymphocytes 0.8 - 5.3 10e3/uL 0.4 (L)   Absolute Monocytes 0.0 - 1.3 10e3/uL 0.5   % Immature Granulocytes % 1   Absolute Neutrophils 1.6 - 8.3 10e3/uL 6.8   Absolute NRBCs 10e3/uL 0.0   NRBCs per 100 WBC <1 /100 0     Results reviewed, labs MET treatment parameters, ok to proceed with treatment.      Post Infusion Assessment:  Patient tolerated infusion without incident.  Blood return noted pre and post infusion.  Site patent and intact, free from redness, edema or discomfort.  No evidence of extravasations.  Access discontinued per protocol.       Discharge Plan:   Prescription refills given for decadron.  Discharge instructions reviewed with: Patient.  Patient and/or family verbalized understanding of discharge instructions and all questions answered.  Printed AVS.  Patient will return 5/13 for next appointment.   Patient discharged in stable condition accompanied by: self.  Departure Mode: Ambulatory.      Brianna Morrow RN

## 2024-05-06 NOTE — NURSING NOTE
Chief Complaint   Patient presents with    Blood Draw     Labs drawn with piv start by rn.  VS taken.     Labs drawn with PIV start by rn.  Pt tolerated well.  VS taken and pt checked in for next appt.    Jaja Gould RN

## 2024-05-07 ENCOUNTER — APPOINTMENT (OUTPATIENT)
Dept: RADIATION ONCOLOGY | Facility: CLINIC | Age: 54
End: 2024-05-07
Attending: RADIOLOGY
Payer: COMMERCIAL

## 2024-05-07 PROCEDURE — 77014 PR CT GUIDE FOR PLACEMENT RADIATION THERAPY FIELDS: CPT | Mod: 26 | Performed by: RADIOLOGY

## 2024-05-07 PROCEDURE — 77386 HC IMRT TREATMENT DELIVERY, COMPLEX: CPT | Performed by: RADIOLOGY

## 2024-05-07 NOTE — TELEPHONE ENCOUNTER
FUTURE VISIT INFORMATION      SURGERY INFORMATION:  Date: 5/21/24  Location: uu or  Surgeon:  Dede Henry MD   Anesthesia Type:  general  Procedure: EXAM UNDER ANESTHESIA, GYNECOLOGIC, WITH INSERTION OF KENDY SLEEVE, WITH ULTRASOUND GUIDANCE possible INSERTION, FIDUCIAL MARKER SEED, CERVIX, FOR RADIATION THERAPY   Consult: ov 5/6/24    RECORDS REQUESTED FROM:       Primary Care Provider: [facility]

## 2024-05-08 ENCOUNTER — APPOINTMENT (OUTPATIENT)
Dept: RADIATION ONCOLOGY | Facility: CLINIC | Age: 54
End: 2024-05-08
Attending: RADIOLOGY
Payer: COMMERCIAL

## 2024-05-08 PROCEDURE — 77014 PR CT GUIDE FOR PLACEMENT RADIATION THERAPY FIELDS: CPT | Mod: 26 | Performed by: RADIOLOGY

## 2024-05-08 PROCEDURE — 77386 HC IMRT TREATMENT DELIVERY, COMPLEX: CPT | Performed by: RADIOLOGY

## 2024-05-09 ENCOUNTER — APPOINTMENT (OUTPATIENT)
Dept: RADIATION ONCOLOGY | Facility: CLINIC | Age: 54
End: 2024-05-09
Attending: RADIOLOGY
Payer: COMMERCIAL

## 2024-05-09 ENCOUNTER — VIRTUAL VISIT (OUTPATIENT)
Dept: ONCOLOGY | Facility: CLINIC | Age: 54
End: 2024-05-09
Attending: OBSTETRICS & GYNECOLOGY
Payer: COMMERCIAL

## 2024-05-09 VITALS — WEIGHT: 196 LBS | BODY MASS INDEX: 38.48 KG/M2 | HEIGHT: 60 IN

## 2024-05-09 DIAGNOSIS — C53.1 MALIGNANT NEOPLASM OF EXOCERVIX (H): Primary | ICD-10-CM

## 2024-05-09 DIAGNOSIS — K62.89 ANAL PAIN: ICD-10-CM

## 2024-05-09 PROCEDURE — 99214 OFFICE O/P EST MOD 30 MIN: CPT | Mod: 95 | Performed by: NURSE PRACTITIONER

## 2024-05-09 PROCEDURE — 77014 PR CT GUIDE FOR PLACEMENT RADIATION THERAPY FIELDS: CPT | Mod: 26 | Performed by: STUDENT IN AN ORGANIZED HEALTH CARE EDUCATION/TRAINING PROGRAM

## 2024-05-09 PROCEDURE — 77386 HC IMRT TREATMENT DELIVERY, COMPLEX: CPT | Performed by: RADIOLOGY

## 2024-05-09 RX ORDER — HYDROCORTISONE ACETATE PRAMOXINE HCL 1; 1 G/100G; G/100G
CREAM TOPICAL 2 TIMES DAILY
Qty: 30 G | Refills: 1 | Status: SHIPPED | OUTPATIENT
Start: 2024-05-09

## 2024-05-09 ASSESSMENT — PAIN SCALES - GENERAL: PAINLEVEL: SEVERE PAIN (7)

## 2024-05-09 NOTE — PATIENT INSTRUCTIONS
I have sent a cream with hydrocortisone and pramoxine to the discharge pharmacy at the hospital- I am checking with the radiation oncology team to make sure this is okay

## 2024-05-09 NOTE — LETTER
"    2024         RE: Michelle Mills  [address]  [facility] [address]        Dear Colleague,    Thank you for referring your patient, Michelle Mills, to the [facility]. Please see a copy of my visit note below.        Virtual Visit Details    Type of service:  Video Visit   Originating Location (pt. Location): Home  Distant Location (provider location):  On-site  Platform used for Video Visit: [facility]    Gynecologic Oncology Follow Up Note    RE: Michelle Mills   : 1970  PRONOUNS: she/her/hers  HELGA: 2024  GYNECOLOGIC ONCOLOGIST: Katiuska Edward MD    CC: Michelle Mills is a 53 year old female with stage IB3 cervical cancer presenting for a toxicity check while undergoing cisplatin 40mg/m2 IV weekly as radiotherapy sensitizer    INTERVAL HISTORY:    Michelle is feeling fair- she completed D15 cisplatin on 24 without adverse effects. She denies fevers, chills, tinnitus, neuropathy, nausea/vomiting, or bladder concerns. No fevers or chills. Eating and drinking well.     Her biggest concern today is anorectal pain that has started during her course of treatment. She tends to feel a mild discomfort at rest, but spikes to a severe level with bowel movements (\"I nearly pull out the bathroom sink\"). She thinks she may have hemorrhoids- some occasional bright red spotting on toilet paper with wiping, feels her anal region is slightly swollen. She has been taking Tylenol without relief. Has tried oxycodone but this did not help. Using Preparation H and started using Tucks pads a few days ago. Had one loose stool for the first time yesterday, relieved with loperamide 2mg x1. She does not want an exam and declines to come in for evaluation.    Still having some light vaginal discharge, no vaginal bleeding.     Taking dexamethasone 8mg for three days after each chemo dose, denies any adverse effects with this and would like to continue this.        ONCOLOGY HISTORY:  24 " noted a friable cervix  Pap: ASC-US, hrHPV 16 positive     2/12/24 Pelvic US - endometrial stripe 9 mm     3/1/24 Colposcopy - anterior cervix enlarged, cx biopsy taken and endometrial biopsy  Pathology cervix at 1 o'clock squamous cell carcinoma extending to resection margin  ECC:fragments of squamous cell carcinoma, papillary variant  EMBx: special fragment of squamous cell carcinoma     3/22/24 PET CT  CERVIX 7.4 X 4.6 cm, SUV 19.8, right common vivian 1.3 cm x 1.1 cm, low level SUV 3.1 cm,     3/28/24 MRI Pelvic  Large mass confined to the cervix, without extension to parametrial or lower vagina        Past Medical History:   Diagnosis Date     HTN (hypertension)       No past surgical history on file.  Social History     Socioeconomic History     Marital status: Single   Tobacco Use     Smoking status: Every Day     Types: Cigarettes     Passive exposure: Current     Smokeless tobacco: Never      No family history on file.   Current Outpatient Medications   Medication Sig Dispense Refill     acetaminophen (TYLENOL) 500 MG tablet Take 500-1,000 mg by mouth every 6 hours as needed for mild pain       chlorthalidone (HYGROTON) 25 MG tablet Take 1 tablet (25 mg) by mouth daily 60 tablet 0     chlorthalidone (HYGROTON) 25 MG tablet 1 tablet in the morning with food Orally       dexAMETHasone (DECADRON) 4 MG tablet Take 2 tablets (8 mg) by mouth daily Take for 3 days, starting the day after chemo on day 2 and 9. Take with food. If no nausea and vomiting with first cisplatin doses, may stop dexamethasone. 12 tablet 2     multivitamin, therapeutic (THERA-VIT) TABS tablet Take 1 tablet by mouth daily       naproxen (NAPROSYN) 500 MG tablet 1 tablet with food or milk as needed, do not take along with aleve, aspirin, ibuprofen or motrin Orally every 12 hrs for 15 days (Patient not taking: Reported on 4/29/2024)       ondansetron (ZOFRAN) 8 MG tablet Take 1 tablet (8 mg) by mouth every 8 hours as needed for nausea (vomiting)  (Patient not taking: Reported on 4/29/2024) 30 tablet 2     prochlorperazine (COMPAZINE) 10 MG tablet Take 1 tablet (10 mg) by mouth every 6 hours as needed for nausea or vomiting (Patient not taking: Reported on 4/29/2024) 30 tablet 2     vitamin D2 (ERGOCALCIFEROL) 91027 units (1250 mcg) capsule 1 capsule with meals Orally Once weekly for 90 days       No current facility-administered medications for this visit.      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 Continue treatment with cisplatin sensitizer 40mg/m2 IV weekly x5 weeks per Dr. Edward's plan, to be given in conjunction with pelvic radiation. Return to clinic on 5/13/24 for C1D22 cisplatin and labs. Weekly visits with me during treatment for symptom management.  Reviewed alarm signs and symptoms and when to seek further care.   2) Anorectal pain: Mostly occurring with bowel movements. She feels this is related to hemorrhoids. Current regimen providing inadequate symptom relief. Ultimately, I do recommend that she have an in person evaluation/exam, which she declines. Will trial hydrocortisone-pramoxine BID, but if symptoms worsen or fail to improve, she would need an exam. I have sent a message to radiation oncology team regarding this cream to check for any contraindications with concurrent radiation.  3)  Patient verbalized understanding of and agreement with plan    MDM:  32 minutes spent on date of service on visit, including chart review, face to face visit, documentation, and coordination of care.    ANDREW Brown, CNP (she/her)  Division of Gynecologic Oncology           Again, thank you for allowing me to participate in the care of your patient.        Sincerely,        ANDREW Brown CNP

## 2024-05-09 NOTE — PROGRESS NOTES
"    Virtual Visit Details    Type of service:  Video Visit   Originating Location (pt. Location): Home  Distant Location (provider location):  On-site  Platform used for Video Visit: Well    Gynecologic Oncology Follow Up Note    RE: Michelle Mills   : 1970  PRONOUNS: she/her/hers  HELGA: 2024  GYNECOLOGIC ONCOLOGIST: Katiuska Edward MD    CC: Michelle Mills is a 53 year old female with stage IB3 cervical cancer presenting for a toxicity check while undergoing cisplatin 40mg/m2 IV weekly as radiotherapy sensitizer    INTERVAL HISTORY:    Michelle is feeling fair- she completed D15 cisplatin on 24 without adverse effects. She denies fevers, chills, tinnitus, neuropathy, nausea/vomiting, or bladder concerns. No fevers or chills. Eating and drinking well.     Her biggest concern today is anorectal pain that has started during her course of treatment. She tends to feel a mild discomfort at rest, but spikes to a severe level with bowel movements (\"I nearly pull out the bathroom sink\"). She thinks she may have hemorrhoids- some occasional bright red spotting on toilet paper with wiping, feels her anal region is slightly swollen. She has been taking Tylenol without relief. Has tried oxycodone but this did not help. Using Preparation H and started using Tucks pads a few days ago. Had one loose stool for the first time yesterday, relieved with loperamide 2mg x1. She does not want an exam and declines to come in for evaluation.    Still having some light vaginal discharge, no vaginal bleeding.     Taking dexamethasone 8mg for three days after each chemo dose, denies any adverse effects with this and would like to continue this.        ONCOLOGY HISTORY:  24 noted a friable cervix  Pap: ASC-US, hrHPV 16 positive     24 Pelvic US - endometrial stripe 9 mm     3/1/24 Colposcopy - anterior cervix enlarged, cx biopsy taken and endometrial biopsy  Pathology cervix at 1 o'clock squamous cell carcinoma " extending to resection margin  ECC:fragments of squamous cell carcinoma, papillary variant  EMBx: special fragment of squamous cell carcinoma     3/22/24 PET CT  CERVIX 7.4 X 4.6 cm, SUV 19.8, right common vivian 1.3 cm x 1.1 cm, low level SUV 3.1 cm,     3/28/24 MRI Pelvic  Large mass confined to the cervix, without extension to parametrial or lower vagina        Past Medical History:   Diagnosis Date    HTN (hypertension)       No past surgical history on file.  Social History     Socioeconomic History    Marital status: Single   Tobacco Use    Smoking status: Every Day     Types: Cigarettes     Passive exposure: Current    Smokeless tobacco: Never      No family history on file.   Current Outpatient Medications   Medication Sig Dispense Refill    acetaminophen (TYLENOL) 500 MG tablet Take 500-1,000 mg by mouth every 6 hours as needed for mild pain      chlorthalidone (HYGROTON) 25 MG tablet Take 1 tablet (25 mg) by mouth daily 60 tablet 0    chlorthalidone (HYGROTON) 25 MG tablet 1 tablet in the morning with food Orally      dexAMETHasone (DECADRON) 4 MG tablet Take 2 tablets (8 mg) by mouth daily Take for 3 days, starting the day after chemo on day 2 and 9. Take with food. If no nausea and vomiting with first cisplatin doses, may stop dexamethasone. 12 tablet 2    multivitamin, therapeutic (THERA-VIT) TABS tablet Take 1 tablet by mouth daily      naproxen (NAPROSYN) 500 MG tablet 1 tablet with food or milk as needed, do not take along with aleve, aspirin, ibuprofen or motrin Orally every 12 hrs for 15 days (Patient not taking: Reported on 4/29/2024)      ondansetron (ZOFRAN) 8 MG tablet Take 1 tablet (8 mg) by mouth every 8 hours as needed for nausea (vomiting) (Patient not taking: Reported on 4/29/2024) 30 tablet 2    prochlorperazine (COMPAZINE) 10 MG tablet Take 1 tablet (10 mg) by mouth every 6 hours as needed for nausea or vomiting (Patient not taking: Reported on 4/29/2024) 30 tablet 2    vitamin D2  (ERGOCALCIFEROL) 62013 units (1250 mcg) capsule 1 capsule with meals Orally Once weekly for 90 days       No current facility-administered medications for this visit.      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 Continue treatment with cisplatin sensitizer 40mg/m2 IV weekly x5 weeks per Dr. Edward's plan, to be given in conjunction with pelvic radiation. Return to clinic on 5/13/24 for C1D22 cisplatin and labs. Weekly visits with me during treatment for symptom management.  Reviewed alarm signs and symptoms and when to seek further care.   2) Anorectal pain: Mostly occurring with bowel movements. She feels this is related to hemorrhoids. Current regimen providing inadequate symptom relief. Ultimately, I do recommend that she have an in person evaluation/exam, which she declines. Will trial hydrocortisone-pramoxine BID, but if symptoms worsen or fail to improve, she would need an exam. I have sent a message to radiation oncology team regarding this cream to check for any contraindications with concurrent radiation.  3) Patient verbalized understanding of and agreement with plan    MDM:  32 minutes spent on date of service on visit, including chart review, face to face visit, documentation, and coordination of care.    ANDREW Brown, CNP (she/her)  Division  of Gynecologic Oncology

## 2024-05-09 NOTE — NURSING NOTE
Is the patient currently in the state of [place]? YES    Visit mode:VIDEO    If the visit is dropped, the patient can be reconnected by: VIDEO VISIT: Text to cell phone:   Telephone Information:   Mobile [phone]       Will anyone else be joining the visit? NO  (If patient encounters technical issues they should call [phone]    How would you like to obtain your AVS? MyChart    Are changes needed to the allergy or medication list? Pt stated no changes to allergies and Pt stated no med changes    Are refills needed on medications prescribed by this physician? NO    Reason for visit: MECHE COWAN

## 2024-05-10 ENCOUNTER — APPOINTMENT (OUTPATIENT)
Dept: RADIATION ONCOLOGY | Facility: CLINIC | Age: 54
End: 2024-05-10
Attending: RADIOLOGY
Payer: COMMERCIAL

## 2024-05-10 PROCEDURE — 77014 PR CT GUIDE FOR PLACEMENT RADIATION THERAPY FIELDS: CPT | Mod: 26 | Performed by: RADIOLOGY

## 2024-05-10 PROCEDURE — 77336 RADIATION PHYSICS CONSULT: CPT | Performed by: RADIOLOGY

## 2024-05-10 PROCEDURE — 77386 HC IMRT TREATMENT DELIVERY, COMPLEX: CPT | Performed by: RADIOLOGY

## 2024-05-10 PROCEDURE — 77427 RADIATION TX MANAGEMENT X5: CPT | Performed by: RADIOLOGY

## 2024-05-10 NOTE — TELEPHONE ENCOUNTER
FUTURE VISIT INFORMATION        SURGERY INFORMATION:  Date: 5/21/24  Location: uu or  Surgeon:  Dede Henry MD   Anesthesia Type:  general  Procedure: EXAM UNDER ANESTHESIA, GYNECOLOGIC, WITH INSERTION OF KENDY SLEEVE, WITH ULTRASOUND GUIDANCE possible INSERTION, FIDUCIAL MARKER SEED, CERVIX, FOR RADIATION THERAPY   Consult: ov 5/6/24     RECORDS REQUESTED FROM:         Primary Care Provider: [facility]

## 2024-05-13 ENCOUNTER — APPOINTMENT (OUTPATIENT)
Dept: RADIATION ONCOLOGY | Facility: CLINIC | Age: 54
End: 2024-05-13
Attending: RADIOLOGY
Payer: COMMERCIAL

## 2024-05-13 ENCOUNTER — APPOINTMENT (OUTPATIENT)
Dept: LAB | Facility: CLINIC | Age: 54
End: 2024-05-13
Attending: OBSTETRICS & GYNECOLOGY
Payer: COMMERCIAL

## 2024-05-13 ENCOUNTER — INFUSION THERAPY VISIT (OUTPATIENT)
Dept: ONCOLOGY | Facility: CLINIC | Age: 54
End: 2024-05-13
Attending: OBSTETRICS & GYNECOLOGY
Payer: COMMERCIAL

## 2024-05-13 VITALS
HEART RATE: 86 BPM | WEIGHT: 193.12 LBS | DIASTOLIC BLOOD PRESSURE: 84 MMHG | SYSTOLIC BLOOD PRESSURE: 132 MMHG | OXYGEN SATURATION: 97 % | BODY MASS INDEX: 37.72 KG/M2 | TEMPERATURE: 98.2 F | RESPIRATION RATE: 18 BRPM

## 2024-05-13 VITALS
OXYGEN SATURATION: 97 % | BODY MASS INDEX: 37.71 KG/M2 | TEMPERATURE: 98.2 F | WEIGHT: 193.1 LBS | HEART RATE: 86 BPM | SYSTOLIC BLOOD PRESSURE: 132 MMHG | RESPIRATION RATE: 18 BRPM | DIASTOLIC BLOOD PRESSURE: 84 MMHG

## 2024-05-13 DIAGNOSIS — K59.00 CONSTIPATION, UNSPECIFIED CONSTIPATION TYPE: ICD-10-CM

## 2024-05-13 DIAGNOSIS — Z51.11 ENCOUNTER FOR ANTINEOPLASTIC CHEMOTHERAPY: ICD-10-CM

## 2024-05-13 DIAGNOSIS — K64.4 EXTERNAL HEMORRHOIDS: ICD-10-CM

## 2024-05-13 DIAGNOSIS — R52 PAIN: ICD-10-CM

## 2024-05-13 DIAGNOSIS — C53.1 MALIGNANT NEOPLASM OF EXOCERVIX (H): Primary | ICD-10-CM

## 2024-05-13 LAB
ALBUMIN SERPL BCG-MCNC: 3.6 G/DL (ref 3.5–5.2)
ALP SERPL-CCNC: 75 U/L (ref 40–150)
ALT SERPL W P-5'-P-CCNC: 22 U/L (ref 0–50)
ANION GAP SERPL CALCULATED.3IONS-SCNC: 12 MMOL/L (ref 7–15)
AST SERPL W P-5'-P-CCNC: 20 U/L (ref 0–45)
BASOPHILS # BLD AUTO: 0 10E3/UL (ref 0–0.2)
BASOPHILS NFR BLD AUTO: 1 %
BILIRUB SERPL-MCNC: 0.3 MG/DL
BUN SERPL-MCNC: 11.9 MG/DL (ref 6–20)
CALCIUM SERPL-MCNC: 8.9 MG/DL (ref 8.6–10)
CHLORIDE SERPL-SCNC: 101 MMOL/L (ref 98–107)
CREAT SERPL-MCNC: 0.77 MG/DL (ref 0.51–0.95)
DEPRECATED HCO3 PLAS-SCNC: 26 MMOL/L (ref 22–29)
EGFRCR SERPLBLD CKD-EPI 2021: >90 ML/MIN/1.73M2
EOSINOPHIL # BLD AUTO: 0.2 10E3/UL (ref 0–0.7)
EOSINOPHIL NFR BLD AUTO: 3 %
ERYTHROCYTE [DISTWIDTH] IN BLOOD BY AUTOMATED COUNT: 19.3 % (ref 10–15)
GLUCOSE SERPL-MCNC: 128 MG/DL (ref 70–99)
HCT VFR BLD AUTO: 31.9 % (ref 35–47)
HGB BLD-MCNC: 10.7 G/DL (ref 11.7–15.7)
IMM GRANULOCYTES # BLD: 0 10E3/UL
IMM GRANULOCYTES NFR BLD: 1 %
LYMPHOCYTES # BLD AUTO: 0.5 10E3/UL (ref 0.8–5.3)
LYMPHOCYTES NFR BLD AUTO: 8 %
MAGNESIUM SERPL-MCNC: 1.5 MG/DL (ref 1.7–2.3)
MCH RBC QN AUTO: 30.5 PG (ref 26.5–33)
MCHC RBC AUTO-ENTMCNC: 33.5 G/DL (ref 31.5–36.5)
MCV RBC AUTO: 91 FL (ref 78–100)
MONOCYTES # BLD AUTO: 0.3 10E3/UL (ref 0–1.3)
MONOCYTES NFR BLD AUTO: 5 %
NEUTROPHILS # BLD AUTO: 4.9 10E3/UL (ref 1.6–8.3)
NEUTROPHILS NFR BLD AUTO: 82 %
NRBC # BLD AUTO: 0 10E3/UL
NRBC BLD AUTO-RTO: 0 /100
PLATELET # BLD AUTO: 146 10E3/UL (ref 150–450)
POTASSIUM SERPL-SCNC: 3.6 MMOL/L (ref 3.4–5.3)
PROT SERPL-MCNC: 6.7 G/DL (ref 6.4–8.3)
RBC # BLD AUTO: 3.51 10E6/UL (ref 3.8–5.2)
SODIUM SERPL-SCNC: 139 MMOL/L (ref 135–145)
WBC # BLD AUTO: 5.9 10E3/UL (ref 4–11)

## 2024-05-13 PROCEDURE — 80053 COMPREHEN METABOLIC PANEL: CPT | Performed by: OBSTETRICS & GYNECOLOGY

## 2024-05-13 PROCEDURE — 999N000285 HC STATISTIC VASC ACCESS LAB DRAW WITH PIV START

## 2024-05-13 PROCEDURE — 99215 OFFICE O/P EST HI 40 MIN: CPT | Performed by: NURSE PRACTITIONER

## 2024-05-13 PROCEDURE — 36415 COLL VENOUS BLD VENIPUNCTURE: CPT | Performed by: OBSTETRICS & GYNECOLOGY

## 2024-05-13 PROCEDURE — 999N000248 HC STATISTIC IV INSERT WITH US BY RN

## 2024-05-13 PROCEDURE — 96413 CHEMO IV INFUSION 1 HR: CPT

## 2024-05-13 PROCEDURE — 83735 ASSAY OF MAGNESIUM: CPT | Performed by: OBSTETRICS & GYNECOLOGY

## 2024-05-13 PROCEDURE — 258N000003 HC RX IP 258 OP 636: Performed by: OBSTETRICS & GYNECOLOGY

## 2024-05-13 PROCEDURE — 96375 TX/PRO/DX INJ NEW DRUG ADDON: CPT

## 2024-05-13 PROCEDURE — 96366 THER/PROPH/DIAG IV INF ADDON: CPT

## 2024-05-13 PROCEDURE — 77386 HC IMRT TREATMENT DELIVERY, COMPLEX: CPT | Performed by: RADIOLOGY

## 2024-05-13 PROCEDURE — 96361 HYDRATE IV INFUSION ADD-ON: CPT

## 2024-05-13 PROCEDURE — 99212 OFFICE O/P EST SF 10 MIN: CPT | Performed by: NURSE PRACTITIONER

## 2024-05-13 PROCEDURE — 85025 COMPLETE CBC W/AUTO DIFF WBC: CPT | Performed by: OBSTETRICS & GYNECOLOGY

## 2024-05-13 PROCEDURE — 96367 TX/PROPH/DG ADDL SEQ IV INF: CPT

## 2024-05-13 PROCEDURE — 250N000011 HC RX IP 250 OP 636: Performed by: OBSTETRICS & GYNECOLOGY

## 2024-05-13 RX ORDER — PALONOSETRON 0.05 MG/ML
0.25 INJECTION, SOLUTION INTRAVENOUS ONCE
Status: COMPLETED | OUTPATIENT
Start: 2024-05-13 | End: 2024-05-13

## 2024-05-13 RX ORDER — MAGNESIUM SULFATE HEPTAHYDRATE 40 MG/ML
2 INJECTION, SOLUTION INTRAVENOUS ONCE
Status: COMPLETED | OUTPATIENT
Start: 2024-05-13 | End: 2024-05-13

## 2024-05-13 RX ORDER — AMOXICILLIN 250 MG
1-2 CAPSULE ORAL 2 TIMES DAILY PRN
Qty: 30 TABLET | Refills: 0 | Status: SHIPPED | OUTPATIENT
Start: 2024-05-13

## 2024-05-13 RX ORDER — DEXTROSE, SODIUM CHLORIDE, AND POTASSIUM CHLORIDE 5; .45; .15 G/100ML; G/100ML; G/100ML
2000 INJECTION INTRAVENOUS ONCE
Status: COMPLETED | OUTPATIENT
Start: 2024-05-13 | End: 2024-05-13

## 2024-05-13 RX ORDER — OXYCODONE HYDROCHLORIDE 5 MG/1
5 TABLET ORAL EVERY 8 HOURS PRN
Qty: 10 TABLET | Refills: 0 | Status: SHIPPED | OUTPATIENT
Start: 2024-05-13

## 2024-05-13 RX ORDER — BENZOCAINE/MENTHOL 6 MG-10 MG
LOZENGE MUCOUS MEMBRANE 2 TIMES DAILY
Qty: 14 G | Refills: 1 | Status: ON HOLD | OUTPATIENT
Start: 2024-05-13 | End: 2024-05-21

## 2024-05-13 RX ORDER — DEXAMETHASONE 4 MG/1
8 TABLET ORAL DAILY
Qty: 12 TABLET | Refills: 2 | Status: SHIPPED | OUTPATIENT
Start: 2024-05-13

## 2024-05-13 RX ORDER — OMEGA-3 FATTY ACIDS/FISH OIL 300-1000MG
200-400 CAPSULE ORAL EVERY 6 HOURS PRN
Qty: 30 CAPSULE | Refills: 0 | Status: SHIPPED | OUTPATIENT
Start: 2024-05-13 | End: 2024-05-20

## 2024-05-13 RX ADMIN — PALONOSETRON HYDROCHLORIDE 0.25 MG: 0.25 INJECTION INTRAVENOUS at 09:42

## 2024-05-13 RX ADMIN — POTASSIUM CHLORIDE, DEXTROSE MONOHYDRATE AND SODIUM CHLORIDE 2000 ML: 150; 5; 450 INJECTION, SOLUTION INTRAVENOUS at 08:31

## 2024-05-13 RX ADMIN — CISPLATIN 80 MG: 100 INJECTION, SOLUTION INTRAVENOUS at 10:35

## 2024-05-13 RX ADMIN — DEXAMETHASONE SODIUM PHOSPHATE: 10 INJECTION, SOLUTION INTRAMUSCULAR; INTRAVENOUS at 09:47

## 2024-05-13 RX ADMIN — MAGNESIUM SULFATE 2 G: 2 INJECTION INTRAVENOUS at 08:35

## 2024-05-13 ASSESSMENT — PAIN SCALES - GENERAL
PAINLEVEL: WORST PAIN (10)
PAINLEVEL: WORST PAIN (10)

## 2024-05-13 NOTE — NURSING NOTE
Chief Complaint   Patient presents with    Blood Draw     Labs drawn via PIV by RN in lab. VS Taken.      Labs drawn via peripheral IV. Vital signs taken. Checked into next appointment.   Hannah Osullivan RN

## 2024-05-13 NOTE — LETTER
"    2024         RE: Michelle Mills  [address]  [facility] [address]        Dear Colleague,    Thank you for referring your patient, Michelle Mills, to the [facility]. Please see a copy of my visit note below.    Gynecologic Oncology Return Visit Note    Date: May 13, 2024     RE: Michelle Mills  : 1970  HELGA: May 13, 2024     CC: Stage IB3 cervical cancer    HPI:  Michelle Mills is a 53 year old woman with a diagnosis of stage IB3 cervical cancer.  She presents today for an acute visit.     Oncology History:  24 noted a friable cervix  Pap: ASC-US, hrHPV 16 positive     24 Pelvic US - endometrial stripe 9 mm     3/1/24 Colposcopy - anterior cervix enlarged, cx biopsy taken and endometrial biopsy  Pathology cervix at 1 o'clock squamous cell carcinoma extending to resection margin  ECC:fragments of squamous cell carcinoma, papillary variant  EMBx: special fragment of squamous cell carcinoma     3/22/24 PET CT  CERVIX 7.4 X 4.6 cm, SUV 19.8, right common vivian 1.3 cm x 1.1 cm, low level SUV 3.1 cm,     3/28/24 MRI Pelvic  Large mass confined to the cervix, without extension to parametrial or lower vagina.    Plan: Weekly cisplatin 40 mg/m2 + radiation.    24: Cycle 1 day 1 cisplatin.  24: Cycle 1 day 8 cisplatin.  24: Cycle 1 day 15 cisplatin.  24: Cycle 1 day 22 cisplatin.          Today she comes to clinic with concern for anorectal/hemorrhoid pain.  She reports that she feels as though she is \"pooping hot coles\".  She does have an external hemorrhoid.  She has had hemorrhoids in the past but is never experienced severe pain with the hemorrhoids.  She did try the prescribed hemorrhoid cream as recommended by Jayda Calvo once but felt it was not helpful.  She is using Preparation H and Tucks pads.  She has not noticed a significant improvement in her symptoms.  She is taking 1000 mg of Tylenol as needed with some improvement using this " "4-5 times per day.  She has oxycodone which she has used a couple times for severe pain with improvement.  She has not tried any ibuprofen.  She has had a couple episodes of a streak of fresh red blood after a bowel movement.  Pain present despite the consistency of the stool.  She has some hard stools, some soft stools, and some liquid stools.  She did have a small amount of liquid stool earlier today and took an Imodium as she is \"afraid of having diarrhea\".  She is declining an exam today.                Past Medical History:    Past Medical History:   Diagnosis Date     HTN (hypertension)          Past Surgical History:    No past surgical history on file.      Health Maintenance Due   Topic Date Due     NICOTINE/TOBACCO CESSATION COUNSELING Q 1 YR  Never done     YEARLY PREVENTIVE VISIT  Never done     ADVANCE CARE PLANNING  Never done     MAMMO SCREENING  Never done     COVID-19 Vaccine (1) Never done     Pneumococcal Vaccine: Pediatrics (0 to 5 Years) and At-Risk Patients (6 to 64 Years) (1 of 2 - PCV) Never done     COLORECTAL CANCER SCREENING  Never done     HIV SCREENING  Never done     HEPATITIS C SCREENING  Never done     ZOSTER IMMUNIZATION (1 of 2) Never done     LIPID  Never done     PHQ-2 (once per calendar year)  Never done     HEPATITIS B IMMUNIZATION (2 of 3 - 19+ 3-dose series) 04/02/2024       Current Medications:     Current Outpatient Medications   Medication Sig Dispense Refill     dexAMETHasone (DECADRON) 4 MG tablet Take 2 tablets (8 mg) by mouth daily Take for 3 days, starting the day after chemo on day 2 and 9. Take with food. If no nausea and vomiting with first cisplatin doses, may stop dexamethasone. 12 tablet 2     hydrocortisone (CORTAID) 1 % external cream Apply topically 2 times daily 14 g 1     ibuprofen (ADVIL/MOTRIN) 200 MG capsule Take 1-2 capsules (200-400 mg) by mouth every 6 hours as needed for moderate pain, fever or inflammatory pain 30 capsule 0     oxyCODONE (ROXICODONE) " 5 MG tablet Take 1 tablet (5 mg) by mouth every 8 hours as needed for severe pain 10 tablet 0     senna-docusate (SENOKOT-S/PERICOLACE) 8.6-50 MG tablet Take 1-2 tablets by mouth 2 times daily as needed for constipation 30 tablet 0     witch hazel-glycerin (TUCKS) pad Apply topically as needed for hemorrhoids 50 each 1     acetaminophen (TYLENOL) 500 MG tablet Take 500-1,000 mg by mouth every 6 hours as needed for mild pain       chlorthalidone (HYGROTON) 25 MG tablet Take 1 tablet (25 mg) by mouth daily 60 tablet 0     hydrocortisone-pramoxine (ANALPRAM-HC) rectal cream Place rectally 2 times daily 30 g 1     multivitamin, therapeutic (THERA-VIT) TABS tablet Take 1 tablet by mouth daily       ondansetron (ZOFRAN) 8 MG tablet Take 1 tablet (8 mg) by mouth every 8 hours as needed for nausea (vomiting) (Patient not taking: Reported on 4/29/2024) 30 tablet 2     prochlorperazine (COMPAZINE) 10 MG tablet Take 1 tablet (10 mg) by mouth every 6 hours as needed for nausea or vomiting (Patient not taking: Reported on 4/29/2024) 30 tablet 2     vitamin D2 (ERGOCALCIFEROL) 79485 units (1250 mcg) capsule 1 capsule with meals Orally Once weekly for 90 days           Allergies:        Allergies   Allergen Reactions     Codeine      Other Reaction(s): Unknown        Social History:     Social History     Tobacco Use     Smoking status: Every Day     Types: Cigarettes     Passive exposure: Current     Smokeless tobacco: Never   Substance Use Topics     Alcohol use: Not on file       History   Drug Use Not on file         Family History:     The patient's family history is notable for:    No family history on file.      Physical Exam:     /84   Pulse 86   Temp 98.2  F (36.8  C) (Oral)   Resp 18   Wt 87.6 kg (193 lb 2 oz)   SpO2 97%   BMI 37.72 kg/m    Body mass index is 37.72 kg/m .    General Appearance: alert, no distress     HEENT: no palpable nodules or masses        Cardiovascular: regular rate and rhythm, no  "gallops, rubs or murmurs     Respiratory: lungs clear, no rales, rhonchi or wheezes    Musculoskeletal: extremities non tender and without edema    Skin: no lesions or rashes     Neurological: normal gait, no gross defects     Psychiatric: appropriate mood and affect                               Hematological: normal cervical and supraclavicular lymph nodes     Gastrointestinal:       abdomen soft, non-tender, non-distended    Genitourinary: Deferred.     Lab Results   Component Value Date    WBC 5.9 05/13/2024     Lab Results   Component Value Date    RBC 3.51 05/13/2024     Lab Results   Component Value Date    HGB 10.7 05/13/2024     Lab Results   Component Value Date    HCT 31.9 05/13/2024     No components found for: \"MCT\"  Lab Results   Component Value Date    MCV 91 05/13/2024     Lab Results   Component Value Date    MCH 30.5 05/13/2024     Lab Results   Component Value Date    MCHC 33.5 05/13/2024     Lab Results   Component Value Date    RDW 19.3 05/13/2024     Lab Results   Component Value Date     05/13/2024     % Neutrophils   Date Value Ref Range Status   05/13/2024 82 % Final     % Lymphocytes   Date Value Ref Range Status   05/13/2024 8 % Final     % Monocytes   Date Value Ref Range Status   05/13/2024 5 % Final     % Eosinophils   Date Value Ref Range Status   05/13/2024 3 % Final     % Basophils   Date Value Ref Range Status   05/13/2024 1 % Final     Absolute Immature Granulocytes   Date Value Ref Range Status   05/13/2024 0.0 <=0.4 10e3/uL Final     Last Comprehensive Metabolic Panel:  Sodium   Date Value Ref Range Status   05/13/2024 139 135 - 145 mmol/L Final     Comment:     Reference intervals for this test were updated on 09/26/2023 to more accurately reflect our healthy population. There may be differences in the flagging of prior results with similar values performed with this method. Interpretation of those prior results can be made in the context of the updated reference " intervals.      Potassium   Date Value Ref Range Status   05/13/2024 3.6 3.4 - 5.3 mmol/L Final     Chloride   Date Value Ref Range Status   05/13/2024 101 98 - 107 mmol/L Final     Carbon Dioxide (CO2)   Date Value Ref Range Status   05/13/2024 26 22 - 29 mmol/L Final     Anion Gap   Date Value Ref Range Status   05/13/2024 12 7 - 15 mmol/L Final     Glucose   Date Value Ref Range Status   05/13/2024 128 (H) 70 - 99 mg/dL Final   03/22/2024 137 (A) 70 - 99 mg/dL Final     Urea Nitrogen   Date Value Ref Range Status   05/13/2024 11.9 6.0 - 20.0 mg/dL Final     Creatinine   Date Value Ref Range Status   05/13/2024 0.77 0.51 - 0.95 mg/dL Final     GFR Estimate   Date Value Ref Range Status   05/13/2024 >90 >60 mL/min/1.73m2 Final     Calcium   Date Value Ref Range Status   05/13/2024 8.9 8.6 - 10.0 mg/dL Final     Bilirubin Total   Date Value Ref Range Status   05/13/2024 0.3 <=1.2 mg/dL Final     Alkaline Phosphatase   Date Value Ref Range Status   05/13/2024 75 40 - 150 U/L Final     Comment:     Reference intervals for this test were updated on 11/14/2023 to more accurately reflect our healthy population. There may be differences in the flagging of prior results with similar values performed with this method. Interpretation of those prior results can be made in the context of the updated reference intervals.     ALT   Date Value Ref Range Status   05/13/2024 22 0 - 50 U/L Final     Comment:     Reference intervals for this test were updated on 6/12/2023 to more accurately reflect our healthy population. There may be differences in the flagging of prior results with similar values performed with this method. Interpretation of those prior results can be made in the context of the updated reference intervals.       AST   Date Value Ref Range Status   05/13/2024 20 0 - 45 U/L Final     Comment:     Reference intervals for this test were updated on 6/12/2023 to more accurately reflect our healthy population. There may  "be differences in the flagging of prior results with similar values performed with this method. Interpretation of those prior results can be made in the context of the updated reference intervals.     Lab Results   Component Value Date    ALBUMIN 3.6 05/13/2024     Lab Results   Component Value Date    PROTTOTAL 6.7 05/13/2024     Magnesium   Date Value Ref Range Status   05/13/2024 1.5 (L) 1.7 - 2.3 mg/dL Final           Assessment:    Michelle Mills is a 53 year old woman with a diagnosis of stage IB3 cervical cancer.  She presents today for follow up and disease management by video.    40 minutes spent on the date of the encounter doing chart review, history and exam, documentation, and further activities as noted above.      Plan:     1.) Anorectal pain:   Anorectal pain with bowel movements and feels as though she is \"pooping hot coles\".  Symptoms present despite consistency of stool.  She reports an external hemorrhoid that she has had previously but this is the first time it has caused pain.  She declines an exam today after multiple requests.  Did discuss the importance of an exam to ensure offering her the correct treatment.  Reviewed that her symptoms may also be anorectal irritation from her radiation.  Currently using Preparation H and Tucks pads with minimal improvement.  She is hoping for a prescription for these.  Okay to continue Tylenol 1000 mg no more than 3 or 4 times per day.  Okay to use a dose of ibuprofen once in a while no more than once or twice per day which may help with inflammation.  Okay to use oxycodone as needed refill sent to pharmacy.  Discussed role of keeping stools soft but also balancing with diarrhea from her radiation.  Prescription for senna/docusate to use as needed only if she is having constipation sent to pharmacy.  She is a day short of her Decadron and would like a prescription for this sent to pharmacy as well.  I recommend keeping her visit with Jayda Thursday to " review symptoms; this can also be switched to in person for an exam.  She does meet with Dr. Henry tomorrow who can also do an exam.  Encouraged her to contact the clinic between visits for worsening symptoms and can always discuss the symptoms with radiation oncology who she sees daily for treatments.  Reviewed signs and symptoms for when she should contact the clinic or seek additional care.  Patient to contact the clinic with any questions or concerns in the interim.       2.) Cervical cancer: RTC as scheduled for video visit with Jayda on Thursday to review response of recommendations from today and to fully evaluate her symptoms related to her treatment.    Genetic risk factors were assessed and she does not meet qualification for referral.    Labs and/or tests reviewed include:  CBC. CMP. Mag.    Hannah Cervantes, YAMILE, ANDREW, FNP-C, AOCNP  Oncology Nurse Practitioner  Division of Gynecologic Oncology  Pager: [phone]     CC  Patient Care Team:  No Ref-Primary, Physician as PCP - Brianna Stevens MD as Dede Rios MD as Assigned Cancer Care Provider  Jayda Calvo APRN CNP as Assigned OBGYN Provider  SELF, REFERRED      Again, thank you for allowing me to participate in the care of your patient.        Sincerely,        ANDREW Conenlly CNP

## 2024-05-13 NOTE — PROGRESS NOTES
"Infusion Nursing Note:  Michelle Mills presents today for Cycle 1 Day 22 Cisplatin.    Patient spoke with provider today: Yes: Hannah Cervantes NP    Treatment Conditions:    Component      Latest Ref Rng 5/13/2024  7:35 AM   WBC      4.0 - 11.0 10e3/uL 5.9    RBC Count      3.80 - 5.20 10e6/uL 3.51 (L)    Hemoglobin      11.7 - 15.7 g/dL 10.7 (L)    Hematocrit      35.0 - 47.0 % 31.9 (L)    MCV      78 - 100 fL 91    MCH      26.5 - 33.0 pg 30.5    MCHC      31.5 - 36.5 g/dL 33.5    RDW      10.0 - 15.0 % 19.3 (H)    Platelet Count      150 - 450 10e3/uL 146 (L)    % Neutrophils      % 82    % Lymphocytes      % 8    % Monocytes      % 5    % Eosinophils      % 3    % Basophils      % 1    % Immature Granulocytes      % 1    NRBCs per 100 WBC      <1 /100 0    Absolute Neutrophils      1.6 - 8.3 10e3/uL 4.9    Absolute Lymphocytes      0.8 - 5.3 10e3/uL 0.5 (L)    Absolute Monocytes      0.0 - 1.3 10e3/uL 0.3    Absolute Eosinophils      0.0 - 0.7 10e3/uL 0.2    Absolute Basophils      0.0 - 0.2 10e3/uL 0.0    Absolute Immature Granulocytes      <=0.4 10e3/uL 0.0    Absolute NRBCs      10e3/uL 0.0    Sodium      135 - 145 mmol/L 139    Potassium      3.4 - 5.3 mmol/L 3.6    Carbon Dioxide (CO2)      22 - 29 mmol/L 26    Anion Gap      7 - 15 mmol/L 12    Urea Nitrogen      6.0 - 20.0 mg/dL 11.9    Creatinine      0.51 - 0.95 mg/dL 0.77    GFR Estimate      >60 mL/min/1.73m2 >90    Calcium      8.6 - 10.0 mg/dL 8.9    Chloride      98 - 107 mmol/L 101    Glucose      70 - 99 mg/dL 128 (H)    Alkaline Phosphatase      40 - 150 U/L 75    AST      0 - 45 U/L 20    ALT      0 - 50 U/L 22    Protein Total      6.4 - 8.3 g/dL 6.7    Albumin      3.5 - 5.2 g/dL 3.6    Bilirubin Total      <=1.2 mg/dL 0.3    Magnesium      1.7 - 2.3 mg/dL 1.5 (L)       Legend:  (L) Low  (H) High        Note: Pt arrived to infusing reporting 10/10 rectal pain.  Had been taking Tylenol previously at home but stated, \"that isn't doing anything " "for the pain.\"  Pain increases with bowel movements.  Pt moving slowly and wincing with movement.  Did take Tylenol 1g at 0900 (home supply)    Denies fevers, but reports occasional chills.   No reports of nausea/vomiting, but is having diarrhea.   Poor PO intake this past weekend due to pt's pain level.     Per Jayda Calvo NP provider note from 5/9/24:  \"Anorectal pain: Mostly occurring with bowel movements. She feels this is related to hemorrhoids. Current regimen providing inadequate symptom relief. Ultimately, I do recommend that she have an in person evaluation/exam, which she declines. Will trial hydrocortisone-pramoxine BID, but if symptoms worsen or fail to improve, she would need an exam. I have sent a message to radiation oncology team regarding this cream to check for any contraindications with concurrent radiation.\"    Pt stated, \"I tried the hydrocortisone-pramoxine cream once and it didn't help at all, so I stopped using it. If it doesn't help, I am not going to keep taking it.\"    TORB 5/13/24 at 0900 Lori May NP / Shauna Fong RN:  OK to proceed with chemo today as long as labs meet parameters.  Hannah Cervantes will see pt after infusion today in clinic    Taking dexamethasone 8mg for three days after each chemo dose.    Magnesium 1.5 today. Magnesium replaced with 2 grams IV Mg Sulfate today per electrolyte replacement protocol.     Checked in for clinic appointment at 1352. Hannah Cervantes NP notified.       Intravenous Access:  Peripheral IV placed.    Post Infusion Assessment:  Patient tolerated infusion without incident.  No evidence of extravasations.  Access discontinued per protocol.    Discharge Plan:   Patient declined prescription refills.  Discharge instructions reviewed with: Patient.  Patient and/or family verbalized understanding of discharge instructions and all questions answered.  AVS to patient via [employer].  Patient will return 5/20/24 for next appointment.   Patient " discharged in stable condition accompanied by: self.  Departure Mode: Ambulatory.    Shauna Fong RN

## 2024-05-13 NOTE — NURSING NOTE
Oncology Rooming Note    May 13, 2024 2:05 PM   Michelle Mills is a 53 year old female who presents for:    No chief complaint on file.    Initial Vitals: /84   Pulse 86   Temp 98.2  F (36.8  C) (Oral)   Resp 18   Wt 87.6 kg (193 lb 2 oz)   SpO2 97%   BMI 37.72 kg/m   Estimated body mass index is 37.72 kg/m  as calculated from the following:    Height as of 5/9/24: 1.524 m (5').    Weight as of this encounter: 87.6 kg (193 lb 2 oz). Body surface area is 1.93 meters squared.  Worst Pain (10) Comment: Data Unavailable   No LMP recorded. Patient is postmenopausal.  Allergies reviewed: Yes  Medications reviewed: Yes    Medications: Medication refills not needed today.  Pharmacy name entered into [employer]:    [facility] [place], [place] - [address] [place] [address].  [facility] - [place], [place] - [address]    Frailty Screening:   Is the patient here for a new oncology consult visit in cancer care? 2. No      Clinical concerns: none       Tori Otto

## 2024-05-13 NOTE — PROGRESS NOTES
"Gynecologic Oncology Return Visit Note    Date: May 13, 2024     RE: Michelle Mills  : 1970  HELGA: May 13, 2024     CC: Stage IB3 cervical cancer    HPI:  Michelle Mills is a 53 year old woman with a diagnosis of stage IB3 cervical cancer.  She presents today for an acute visit.     Oncology History:  24 noted a friable cervix  Pap: ASC-US, hrHPV 16 positive     24 Pelvic US - endometrial stripe 9 mm     3/1/24 Colposcopy - anterior cervix enlarged, cx biopsy taken and endometrial biopsy  Pathology cervix at 1 o'clock squamous cell carcinoma extending to resection margin  ECC:fragments of squamous cell carcinoma, papillary variant  EMBx: special fragment of squamous cell carcinoma     3/22/24 PET CT  CERVIX 7.4 X 4.6 cm, SUV 19.8, right common vivian 1.3 cm x 1.1 cm, low level SUV 3.1 cm,     3/28/24 MRI Pelvic  Large mass confined to the cervix, without extension to parametrial or lower vagina.    Plan: Weekly cisplatin 40 mg/m2 + radiation.    24: Cycle 1 day 1 cisplatin.  24: Cycle 1 day 8 cisplatin.  24: Cycle 1 day 15 cisplatin.  24: Cycle 1 day 22 cisplatin.          Today she comes to clinic with concern for anorectal/hemorrhoid pain.  She reports that she feels as though she is \"pooping hot coles\".  She does have an external hemorrhoid.  She has had hemorrhoids in the past but is never experienced severe pain with the hemorrhoids.  She did try the prescribed hemorrhoid cream as recommended by Jayda Calvo once but felt it was not helpful.  She is using Preparation H and Tucks pads.  She has not noticed a significant improvement in her symptoms.  She is taking 1000 mg of Tylenol as needed with some improvement using this 4-5 times per day.  She has oxycodone which she has used a couple times for severe pain with improvement.  She has not tried any ibuprofen.  She has had a couple episodes of a streak of fresh red blood after a bowel movement.  Pain present despite the " "consistency of the stool.  She has some hard stools, some soft stools, and some liquid stools.  She did have a small amount of liquid stool earlier today and took an Imodium as she is \"afraid of having diarrhea\".  She is declining an exam today.                Past Medical History:    Past Medical History:   Diagnosis Date    HTN (hypertension)          Past Surgical History:    No past surgical history on file.      Health Maintenance Due   Topic Date Due    NICOTINE/TOBACCO CESSATION COUNSELING Q 1 YR  Never done    YEARLY PREVENTIVE VISIT  Never done    ADVANCE CARE PLANNING  Never done    MAMMO SCREENING  Never done    COVID-19 Vaccine (1) Never done    Pneumococcal Vaccine: Pediatrics (0 to 5 Years) and At-Risk Patients (6 to 64 Years) (1 of 2 - PCV) Never done    COLORECTAL CANCER SCREENING  Never done    HIV SCREENING  Never done    HEPATITIS C SCREENING  Never done    ZOSTER IMMUNIZATION (1 of 2) Never done    LIPID  Never done    PHQ-2 (once per calendar year)  Never done    HEPATITIS B IMMUNIZATION (2 of 3 - 19+ 3-dose series) 04/02/2024       Current Medications:     Current Outpatient Medications   Medication Sig Dispense Refill    dexAMETHasone (DECADRON) 4 MG tablet Take 2 tablets (8 mg) by mouth daily Take for 3 days, starting the day after chemo on day 2 and 9. Take with food. If no nausea and vomiting with first cisplatin doses, may stop dexamethasone. 12 tablet 2    hydrocortisone (CORTAID) 1 % external cream Apply topically 2 times daily 14 g 1    ibuprofen (ADVIL/MOTRIN) 200 MG capsule Take 1-2 capsules (200-400 mg) by mouth every 6 hours as needed for moderate pain, fever or inflammatory pain 30 capsule 0    oxyCODONE (ROXICODONE) 5 MG tablet Take 1 tablet (5 mg) by mouth every 8 hours as needed for severe pain 10 tablet 0    senna-docusate (SENOKOT-S/PERICOLACE) 8.6-50 MG tablet Take 1-2 tablets by mouth 2 times daily as needed for constipation 30 tablet 0    witch hazel-glycerin (TUCKS) pad " Apply topically as needed for hemorrhoids 50 each 1    acetaminophen (TYLENOL) 500 MG tablet Take 500-1,000 mg by mouth every 6 hours as needed for mild pain      chlorthalidone (HYGROTON) 25 MG tablet Take 1 tablet (25 mg) by mouth daily 60 tablet 0    hydrocortisone-pramoxine (ANALPRAM-HC) rectal cream Place rectally 2 times daily 30 g 1    multivitamin, therapeutic (THERA-VIT) TABS tablet Take 1 tablet by mouth daily      ondansetron (ZOFRAN) 8 MG tablet Take 1 tablet (8 mg) by mouth every 8 hours as needed for nausea (vomiting) (Patient not taking: Reported on 4/29/2024) 30 tablet 2    prochlorperazine (COMPAZINE) 10 MG tablet Take 1 tablet (10 mg) by mouth every 6 hours as needed for nausea or vomiting (Patient not taking: Reported on 4/29/2024) 30 tablet 2    vitamin D2 (ERGOCALCIFEROL) 24225 units (1250 mcg) capsule 1 capsule with meals Orally Once weekly for 90 days           Allergies:        Allergies   Allergen Reactions    Codeine      Other Reaction(s): Unknown        Social History:     Social History     Tobacco Use    Smoking status: Every Day     Types: Cigarettes     Passive exposure: Current    Smokeless tobacco: Never   Substance Use Topics    Alcohol use: Not on file       History   Drug Use Not on file         Family History:     The patient's family history is notable for:    No family history on file.      Physical Exam:     /84   Pulse 86   Temp 98.2  F (36.8  C) (Oral)   Resp 18   Wt 87.6 kg (193 lb 2 oz)   SpO2 97%   BMI 37.72 kg/m    Body mass index is 37.72 kg/m .    General Appearance: alert, no distress     HEENT: no palpable nodules or masses        Cardiovascular: regular rate and rhythm, no gallops, rubs or murmurs     Respiratory: lungs clear, no rales, rhonchi or wheezes    Musculoskeletal: extremities non tender and without edema    Skin: no lesions or rashes     Neurological: normal gait, no gross defects     Psychiatric: appropriate mood and affect                    "            Hematological: normal cervical and supraclavicular lymph nodes     Gastrointestinal:       abdomen soft, non-tender, non-distended    Genitourinary: Deferred.     Lab Results   Component Value Date    WBC 5.9 05/13/2024     Lab Results   Component Value Date    RBC 3.51 05/13/2024     Lab Results   Component Value Date    HGB 10.7 05/13/2024     Lab Results   Component Value Date    HCT 31.9 05/13/2024     No components found for: \"MCT\"  Lab Results   Component Value Date    MCV 91 05/13/2024     Lab Results   Component Value Date    MCH 30.5 05/13/2024     Lab Results   Component Value Date    MCHC 33.5 05/13/2024     Lab Results   Component Value Date    RDW 19.3 05/13/2024     Lab Results   Component Value Date     05/13/2024     % Neutrophils   Date Value Ref Range Status   05/13/2024 82 % Final     % Lymphocytes   Date Value Ref Range Status   05/13/2024 8 % Final     % Monocytes   Date Value Ref Range Status   05/13/2024 5 % Final     % Eosinophils   Date Value Ref Range Status   05/13/2024 3 % Final     % Basophils   Date Value Ref Range Status   05/13/2024 1 % Final     Absolute Immature Granulocytes   Date Value Ref Range Status   05/13/2024 0.0 <=0.4 10e3/uL Final     Last Comprehensive Metabolic Panel:  Sodium   Date Value Ref Range Status   05/13/2024 139 135 - 145 mmol/L Final     Comment:     Reference intervals for this test were updated on 09/26/2023 to more accurately reflect our healthy population. There may be differences in the flagging of prior results with similar values performed with this method. Interpretation of those prior results can be made in the context of the updated reference intervals.      Potassium   Date Value Ref Range Status   05/13/2024 3.6 3.4 - 5.3 mmol/L Final     Chloride   Date Value Ref Range Status   05/13/2024 101 98 - 107 mmol/L Final     Carbon Dioxide (CO2)   Date Value Ref Range Status   05/13/2024 26 22 - 29 mmol/L Final     Anion Gap   Date " Value Ref Range Status   05/13/2024 12 7 - 15 mmol/L Final     Glucose   Date Value Ref Range Status   05/13/2024 128 (H) 70 - 99 mg/dL Final   03/22/2024 137 (A) 70 - 99 mg/dL Final     Urea Nitrogen   Date Value Ref Range Status   05/13/2024 11.9 6.0 - 20.0 mg/dL Final     Creatinine   Date Value Ref Range Status   05/13/2024 0.77 0.51 - 0.95 mg/dL Final     GFR Estimate   Date Value Ref Range Status   05/13/2024 >90 >60 mL/min/1.73m2 Final     Calcium   Date Value Ref Range Status   05/13/2024 8.9 8.6 - 10.0 mg/dL Final     Bilirubin Total   Date Value Ref Range Status   05/13/2024 0.3 <=1.2 mg/dL Final     Alkaline Phosphatase   Date Value Ref Range Status   05/13/2024 75 40 - 150 U/L Final     Comment:     Reference intervals for this test were updated on 11/14/2023 to more accurately reflect our healthy population. There may be differences in the flagging of prior results with similar values performed with this method. Interpretation of those prior results can be made in the context of the updated reference intervals.     ALT   Date Value Ref Range Status   05/13/2024 22 0 - 50 U/L Final     Comment:     Reference intervals for this test were updated on 6/12/2023 to more accurately reflect our healthy population. There may be differences in the flagging of prior results with similar values performed with this method. Interpretation of those prior results can be made in the context of the updated reference intervals.       AST   Date Value Ref Range Status   05/13/2024 20 0 - 45 U/L Final     Comment:     Reference intervals for this test were updated on 6/12/2023 to more accurately reflect our healthy population. There may be differences in the flagging of prior results with similar values performed with this method. Interpretation of those prior results can be made in the context of the updated reference intervals.     Lab Results   Component Value Date    ALBUMIN 3.6 05/13/2024     Lab Results   Component  "Value Date    PROTTOTAL 6.7 05/13/2024     Magnesium   Date Value Ref Range Status   05/13/2024 1.5 (L) 1.7 - 2.3 mg/dL Final           Assessment:    Michelle Mills is a 53 year old woman with a diagnosis of stage IB3 cervical cancer.  She presents today for follow up and disease management by video.    40 minutes spent on the date of the encounter doing chart review, history and exam, documentation, and further activities as noted above.      Plan:     1.) Anorectal pain:   Anorectal pain with bowel movements and feels as though she is \"pooping hot coles\".  Symptoms present despite consistency of stool.  She reports an external hemorrhoid that she has had previously but this is the first time it has caused pain.  She declines an exam today after multiple requests.  Did discuss the importance of an exam to ensure offering her the correct treatment.  Reviewed that her symptoms may also be anorectal irritation from her radiation.  Currently using Preparation H and Tucks pads with minimal improvement.  She is hoping for a prescription for these.  Okay to continue Tylenol 1000 mg no more than 3 or 4 times per day.  Okay to use a dose of ibuprofen once in a while no more than once or twice per day which may help with inflammation.  Okay to use oxycodone as needed refill sent to pharmacy.  Discussed role of keeping stools soft but also balancing with diarrhea from her radiation.  Prescription for senna/docusate to use as needed only if she is having constipation sent to pharmacy.  She is a day short of her Decadron and would like a prescription for this sent to pharmacy as well.  I recommend keeping her visit with Jayda Thursday to review symptoms; this can also be switched to in person for an exam.  She does meet with Dr. Henry tomorrow who can also do an exam.  Encouraged her to contact the clinic between visits for worsening symptoms and can always discuss the symptoms with radiation oncology who she sees daily " for treatments.  Reviewed signs and symptoms for when she should contact the clinic or seek additional care.  Patient to contact the clinic with any questions or concerns in the interim.       2.) Cervical cancer: RTC as scheduled for video visit with Jayda on Thursday to review response of recommendations from today and to fully evaluate her symptoms related to her treatment.    Genetic risk factors were assessed and she does not meet qualification for referral.    Labs and/or tests reviewed include:  CBC. CMP. Mag.    Hannah Cervantes, YAMILE, APRN, FNP-C, AOCNP  Oncology Nurse Practitioner  Division of Gynecologic Oncology  Pager: [phone]     CC  Patient Care Team:  No Ref-Primary, Physician as PCP - Brianna Stevens MD as Dede Rios MD as Assigned Cancer Care Provider  Jayda Calvo APRN CNP as Assigned OBGYN Provider  SELF, REFERRED

## 2024-05-13 NOTE — PATIENT INSTRUCTIONS
Your rectal pain may be from the radiation or your hemorrhoids.  OK to use tylenol 1000 mg every 6-8 hours do not take more then 8791-8568 mg per day.  OK for some occasional ibuprofen 400 mg once or twice per day (with food).  You can also use oxycodone 5 mg if other treatments are not helping.  Use the Preporation H and Tucks pads for your hemorrhoids.  Let us know if your symptoms are not improving.    KELLIE Scott [phone].  The phone number for triage is [phone]

## 2024-05-15 ENCOUNTER — APPOINTMENT (OUTPATIENT)
Dept: RADIATION ONCOLOGY | Facility: CLINIC | Age: 54
End: 2024-05-15
Attending: RADIOLOGY
Payer: COMMERCIAL

## 2024-05-15 ENCOUNTER — PRE VISIT (OUTPATIENT)
Dept: SURGERY | Facility: CLINIC | Age: 54
End: 2024-05-15

## 2024-05-15 VITALS — BODY MASS INDEX: 37.69 KG/M2 | WEIGHT: 193 LBS

## 2024-05-15 DIAGNOSIS — C53.1 MALIGNANT NEOPLASM OF EXOCERVIX (H): Primary | ICD-10-CM

## 2024-05-15 PROCEDURE — 77386 HC IMRT TREATMENT DELIVERY, COMPLEX: CPT | Performed by: RADIOLOGY

## 2024-05-15 NOTE — LETTER
5/15/2024         RE: Michelle Mills  [address]  [facility] [address]        Dear Colleague,    Thank you for referring your patient, Michelle Mills, to the [facility] RADIATION ONCOLOGY. Please see a copy of my visit note below.    RADIATION ONCOLOGY WEEKLY ON TREATMENT VISIT   Encounter Date: May 15, 2024    Patient Name: Michelle Mills  MRN: [id number]  : 1970     Disease and Stage: Squamous cell carcinoma of the cervix, FIGO IB3   Treatment Site: Pelvis  Current Dose/Planned Total Dose: [3060] cGy / [4500] cGy followed by HDR brachytherapy    Concurrent Chemotherapy: Yes  Drug and Frequency: Weekly cisplatin    Gynecologic Oncologist: Katiuska Edward MD    Subjective: Ms. Mills presents to clinic today for her weekly on-treatment visit.  She needs to use the restroom and is thus in a hurry to leave clinic. She continues to have sharp and stabbing pain with defecation. She is also having dysuria.    Nursing ROS:   Nutrition Alteration  Diet Type: Patient's Preference  Skin  Skin Reaction: 1 - Faint erythema or dry desquamation     ENT and Mouth Exam  Mucositis - Current: 0 - None      Gastrointestinal  Nausea: 0 - None  Diarrhea: 0 - None  Genitourinary  Urinary Status: 0 - Normal     Pain Assessment  0-10 Pain Scale: 0     Objective:   Wt 87.5 kg (193 lb)   BMI 37.69 kg/m    /75   Pulse 82   Wt 87.1 kg (192 lb)   BMI 37.69 kg/m    Gen: Appears well, NAD  Abdomen: Nondistended  Skin: No erythema    Laboratory:  Lab Results   Component Value Date    WBC 5.9 2024    HGB 10.7 (L) 2024    HCT 31.9 (L) 2024    MCV 91 2024     (L) 2024     Lab Results   Component Value Date    CR 0.77 2024     Lab Results   Component Value Date     2024    POTASSIUM 3.6 2024    CHLORIDE 101 2024    CO2 26 2024     (H) 2024     Lab Results   Component Value Date    AST 20 2024    ALT 22 2024     ALKPHOS 75 05/13/2024    BILITOTAL 0.3 05/13/2024     Magnesium   Date Value Ref Range Status   05/13/2024 1.5 (L) 1.7 - 2.3 mg/dL Final     Treatment-related toxicities (CTCAE v5.0):  Anorexia: Grade 0: No toxicity  Fatigue: Grade 1: Fatigue relieved by rest  Nausea: Grade 0: No toxicity  Pain: Grade 2: Moderate pain; limiting instrumental ADL  Diarrhea: Grade 1: Increase of <4 stools per day over baseline; mild increase in ostomy output compared to baseline  Urinary frequency: Grade 1: Present  Urinary tract pain: Grade 1: Mild pain  Urinary urgency: Grade 0: No toxicity  Dermatitis: Grade 0: No toxicity    ED visits/Hospitalizations:  None    Missed Treatments:  None    Mosaiq chart and setup information reviewed  IGRT images reviewed    Assessment:    Ms. Mills is a 53 year old female with a squamous cell carcinoma of the cervix, FIGO IB3, with tumor size greater than 4 cm, who is receiving definitive chemoradiation. She is tolerating radiation well.    Plan:   Continue radiation as planned  Discussed imodium, recommended the patient purchase loperamide  Reviewed skin hygiene, moisturizer  Discussed management of perianal pain which may be due to from either hemorrhoids or anal fissure.  Recommend Tucks wipes and barrier cream with either dimethicone or Cavilon (can be purchased online).  Patient was also given a squirt bottle to aid with urination burning.  Vaginal discharge is expected with early chemoRT, will ctm  6.   We reviewed plans for brachytherapy and need for anesthetics for Kevan sleeve placement. Patient is going to meet with anesthesia team to review concerns before deciding if she is willing to proceed. We reviewed that brachytherapy is necessary in order to achieve cure of her cancer.    Linda Espino MD PGY4    Physician Attestation  Ms. Mills was seen and examined by me. I agree with the findings and plan of care as documented in the note. Note above by Dr. Espino  was reviewed and edited by  me and reflects our mutual findings and plan of care.  I personally reviewed the available treatment setup images and vital signs listed.     Dede Henry MD  Department of Radiation Oncology  [facility]              Again, thank you for allowing me to participate in the care of your patient.        Sincerely,        Dede Henry MD

## 2024-05-16 ENCOUNTER — ANESTHESIA EVENT (OUTPATIENT)
Dept: SURGERY | Facility: CLINIC | Age: 54
End: 2024-05-16
Payer: COMMERCIAL

## 2024-05-16 ENCOUNTER — OFFICE VISIT (OUTPATIENT)
Dept: SURGERY | Facility: CLINIC | Age: 54
End: 2024-05-16
Payer: COMMERCIAL

## 2024-05-16 ENCOUNTER — APPOINTMENT (OUTPATIENT)
Dept: RADIATION ONCOLOGY | Facility: CLINIC | Age: 54
End: 2024-05-16
Attending: RADIOLOGY
Payer: COMMERCIAL

## 2024-05-16 ENCOUNTER — PRE VISIT (OUTPATIENT)
Dept: SURGERY | Facility: CLINIC | Age: 54
End: 2024-05-16

## 2024-05-16 VITALS
WEIGHT: 199.3 LBS | BODY MASS INDEX: 39.13 KG/M2 | TEMPERATURE: 98 F | HEART RATE: 52 BPM | RESPIRATION RATE: 16 BRPM | OXYGEN SATURATION: 100 % | SYSTOLIC BLOOD PRESSURE: 144 MMHG | HEIGHT: 60 IN | DIASTOLIC BLOOD PRESSURE: 91 MMHG

## 2024-05-16 DIAGNOSIS — Z01.818 PRE-OPERATIVE EXAMINATION: Primary | ICD-10-CM

## 2024-05-16 PROCEDURE — 99203 OFFICE O/P NEW LOW 30 MIN: CPT | Performed by: PHYSICIAN ASSISTANT

## 2024-05-16 PROCEDURE — 77386 HC IMRT TREATMENT DELIVERY, COMPLEX: CPT | Performed by: RADIOLOGY

## 2024-05-16 ASSESSMENT — ENCOUNTER SYMPTOMS: SEIZURES: 0

## 2024-05-16 ASSESSMENT — LIFESTYLE VARIABLES: TOBACCO_USE: 1

## 2024-05-16 ASSESSMENT — PAIN SCALES - GENERAL: PAINLEVEL: MODERATE PAIN (4)

## 2024-05-16 NOTE — PATIENT INSTRUCTIONS
Preparing for Your Surgery      Name:  Michelle Mills   MRN:  [id number]   :  1970   Today's Date:  2024       Arriving for surgery:  Surgery date:  24  Arrival time:  6:00 am  Surgery time: 8:00 am     Please come to:     Please come to:       [facility]    [address] [place]  [address]     The [facility] ([facility] [place] Patient/Visitor Ramp is at [address]. Patients and visitors who self-park will receive the reduced hospital parking rate. If the Patient /Visitor Ramp is full, please follow the signs to the [employer] car park located at the main hospital entrance.       parking is available (24 hours/ 7 days a week)      Discounted parking pass options are available for patients and visitors. They can be purchased at the [employer] desk at the main hospital entrance.     -    Stop at the security desk and they will direct surgery patients to the Surgery Check in and [facility]. [phone]        - If you need directions, a wheelchair or an escort please stop at the Information/security desk in the lobby.     What can I eat or drink?  -  You may eat and drink normally up to 8 hours prior to arrival time. (Until 10:00 pm on 24)  -  You may have clear liquids until 2 hours prior to arrival time. (Until 4:00 am on 24)    Examples of clear liquids:  Water  Clear broth  Juices (apple, white grape, white cranberry  and cider) without pulp  Noncarbonated, powder based beverages  (lemonade and Mayur-Aid)  Sodas (Sprite, 7-Up, ginger ale and seltzer)  Coffee or tea (without milk or cream)  Gatorade    -  No Alcohol or cannabis products for at least 24 hours before surgery.     Which medicines can I take?      Hold Multivitamins for 7 days before surgery.    Hold Ibuprofen (Advil, Motrin) for 1 day(s) before surgery--unless otherwise directed by surgeon.  Hold Naproxen  (Aleve) for 4 days before surgery.    -  DO NOT take these medications the day of surgery:   Chlorthalidone (Hygroton)   No creams      -  PLEASE TAKE these medications the day of surgery:   Acetaminophen (Tylenol) as needed   Dexamethasone (Decadron)   Oxycodone as needed      How do I prepare myself?  - Please take 2 showers (one the night prior to surgery and one the morning of surgery) using Scrubcare or Hibiclens soap.    Use this soap only from the neck to your toes.     Leave the soap on your skin for one minute--then rinse thoroughly.      You may use your own shampoo and conditioner. No other hair products.   - Please remove all jewelry and body piercings.  - No lotions, deodorants or fragrance.  - No makeup or fingernail polish.   - Bring your ID and insurance card.    -If you use a CPAP machine, please bring the CPAP machine, tubing, and mask to hospital.    -If you have a Deep Brain Stimulator, Spinal Cord Stimulator, or any Neuro Stimulator device---you must bring the remote control to the hospital.      ALL PATIENTS GOING HOME THE SAME DAY OF SURGERY ARE REQUIRED TO HAVE A RESPONSIBLE ADULT TO DRIVE AND BE IN ATTENDANCE WITH THEM FOR 24 HOURS FOLLOWING SURGERY.    Covid testing policy as of 12/06/2022  Your surgeon will notify and schedule you for a COVID test if one is needed before surgery--please direct any questions or COVID symptoms to your surgeon      Questions or Concerns:    - For any questions regarding the day of surgery or your hospital stay, please contact the Pre Admission Nursing Office at [phone].       - If you have health changes between today and your surgery, please call your surgeon.       - For questions after surgery, please call your surgeons office.           Current Visitor Guidelines    You may have 2 visitors in the pre op area.    Visiting hours: 8 a.m. to 8:30 p.m.    Patients confirmed or suspected to have symptoms of COVID 19 or flu:     No visitors allowed for  adult patients.   Children (under age 18) can have 1 named visitor.     People who are sick or showing symptoms of COVID 19 or flu:    Are not allowed to visit patients--we can only make exceptions in special situations.       Please follow these guidelines for your visit:          Please maintain social distance          Masking is optional--however at times you may be asked to wear a mask for the safety of yourself and others     Clean your hands with alcohol hand . Do this when you arrive at and leave the building and patient room,    And again after you touch your mask or anything in the room.     Go directly to and from the room you are visiting.     Stay in the patient s room during your visit. Limit going to other places in the hospital as much as possible     Leave bags and jackets at home or in the car.     For everyone s health, please don t come and go during your visit. That includes for smoking   during your visit.

## 2024-05-16 NOTE — H&P
Pre-Operative H & P     CC:  Preoperative exam to assess for increased cardiopulmonary risk while undergoing surgery and anesthesia.    Date of Encounter: 5/16/2024  Primary Care Physician:  No Ref-Primary, Physician     Reason for visit:   Encounter Diagnosis   Name Primary?    Pre-operative examination Yes       HPI  Michelle Mills is a 53 year old female who presents for pre-operative H & P in preparation for  Procedure Information       Case: [id number] Date/Time: 05/21/24 0800    Procedures:       EXAM UNDER ANESTHESIA, GYNECOLOGIC, WITH INSERTION OF KENDY SLEEVE, WITH ULTRASOUND GUIDANCE (Vagina)      possible INSERTION, FIDUCIAL MARKER SEED, CERVIX, FOR RADIATION THERAPY (Vagina)    Anesthesia type: General    Diagnosis: Malignant neoplasm of overlapping sites of cervix (H) [C53.8]    Pre-op diagnosis: Malignant neoplasm of overlapping sites of cervix (H) [C53.8]    Location:  OR [address] [place] [address] OR    Providers: Dede Henry MD            The patient is a 53-year-old woman with a past medical history significant for tobacco use, hypertension, mild anemia and thrombocytopenia, hypomagnesemia, cervical cancer who has been treated with ongoing chemo and radiation therapy.  The patient has been followed with Dr. Henry and is now scheduled for the procedure as above.    History is obtained from the patient and chart review    Hx of abnormal bleeding or anti-platelet use: none    Menstrual history: No LMP recorded. Patient is postmenopausal.:      Past Medical History  Past Medical History:   Diagnosis Date    Anemia     HTN (hypertension)     Hypomagnesemia     Malignant neoplasm of overlapping sites of cervix (H)     Thrombocytopenia (H24)     Tobacco abuse        Past Surgical History  History reviewed. No pertinent surgical history.    Prior to Admission Medications  Current Outpatient Medications   Medication Sig Dispense Refill    acetaminophen (TYLENOL) 500 MG tablet Take 500-1,000 mg by mouth every 6  hours as needed for mild pain      chlorthalidone (HYGROTON) 25 MG tablet Take 1 tablet (25 mg) by mouth daily (Patient taking differently: Take 25 mg by mouth every morning) 60 tablet 0    dexAMETHasone (DECADRON) 4 MG tablet Take 2 tablets (8 mg) by mouth daily Take for 3 days, starting the day after chemo on day 2 and 9. Take with food. If no nausea and vomiting with first cisplatin doses, may stop dexamethasone. 12 tablet 2    hydrocortisone (CORTAID) 1 % external cream Apply topically 2 times daily (Patient taking differently: Apply topically as needed) 14 g 1    hydrocortisone-pramoxine (ANALPRAM-HC) rectal cream Place rectally 2 times daily (Patient taking differently: Place rectally as needed) 30 g 1    ibuprofen (ADVIL/MOTRIN) 200 MG capsule Take 1-2 capsules (200-400 mg) by mouth every 6 hours as needed for moderate pain, fever or inflammatory pain 30 capsule 0    multivitamin, therapeutic (THERA-VIT) TABS tablet Take 1 tablet by mouth daily      oxyCODONE (ROXICODONE) 5 MG tablet Take 1 tablet (5 mg) by mouth every 8 hours as needed for severe pain 10 tablet 0    witch hazel-glycerin (TUCKS) pad Apply topically as needed for hemorrhoids 50 each 1    ondansetron (ZOFRAN) 8 MG tablet Take 1 tablet (8 mg) by mouth every 8 hours as needed for nausea (vomiting) (Patient not taking: Reported on 4/29/2024) 30 tablet 2    prochlorperazine (COMPAZINE) 10 MG tablet Take 1 tablet (10 mg) by mouth every 6 hours as needed for nausea or vomiting (Patient not taking: Reported on 4/29/2024) 30 tablet 2    senna-docusate (SENOKOT-S/PERICOLACE) 8.6-50 MG tablet Take 1-2 tablets by mouth 2 times daily as needed for constipation (Patient not taking: Reported on 5/16/2024) 30 tablet 0    vitamin D2 (ERGOCALCIFEROL) 35569 units (1250 mcg) capsule 1 capsule with meals Orally Once weekly for 90 days (Patient not taking: Reported on 5/16/2024)         Allergies  Allergies   Allergen Reactions    Codeine      Other Reaction(s):  Unknown       Social History  Social History     Socioeconomic History    Marital status: Single     Spouse name: Not on file    Number of children: Not on file    Years of education: Not on file    Highest education level: Not on file   Occupational History    Not on file   Tobacco Use    Smoking status: Every Day     Types: Cigarettes     Passive exposure: Current    Smokeless tobacco: Never   Substance and Sexual Activity    Alcohol use: Yes     Comment: Occ    Drug use: Not Currently    Sexual activity: Not on file   Other Topics Concern    Not on file   Social History Narrative    Not on file     Social Determinants of Health     Financial Resource Strain: Not on file   Food Insecurity: Not on file   Transportation Needs: Not on file   Physical Activity: Not on file   Stress: Not on file   Social Connections: Not on file   Interpersonal Safety: Not on file   Housing Stability: Not on file       Family History  Family History   Problem Relation Age of Onset    Anesthesia Reaction Granddaughter         difficulty coming out of anesthesia, PONV, slow to wake    Malignant Hyperthermia No family hx of     Venous thrombosis No family hx of        Review of Systems  The complete review of systems is negative other than noted in the HPI or here.   Anesthesia Evaluation   Pt has not had prior anesthetic         ROS/MED HX  ENT/Pulmonary:     (+)     ANTONIA risk factors, snores loudly, hypertension, obese,        tobacco use, Current use,                       Neurologic:  - neg neurologic ROS  (-) no seizures, no CVA and no TIA   Cardiovascular:     (+)  hypertension- -   -  - -                                      METS/Exercise Tolerance: >4 METS    Hematologic: Comments: Thrombocytopenia     (+)      anemia,          Musculoskeletal:       GI/Hepatic: Comment: Pain with defecation     (+) GERD (food related), Other,                  Renal/Genitourinary: Comment: Dysuria       Endo: Comment: Hypomagnesemia     (+)             Chronic steroid usage for  Date most recently used: patient takes decadron with chemotherapy. Obesity,       Psychiatric/Substance Use:  - neg psychiatric ROS     Infectious Disease:  - neg infectious disease ROS     Malignancy:   (+) Malignancy, History of Other.Other CA cervical cancer status post Chemo and Radiation.    Other:  - neg other ROS    (+)  , H/O Chronic Pain,         BP (!) 144/91 (BP Location: Right arm, Patient Position: Sitting, Cuff Size: Adult Regular)   Pulse 52   Temp 98  F (36.7  C) (Oral)   Resp 16   Ht 1.524 m (5')   Wt 90.4 kg (199 lb 4.8 oz)   SpO2 100%   Breastfeeding No   BMI 38.92 kg/m      Physical Exam   Constitutional: Awake, alert, cooperative, no apparent distress, and appears stated age.  Eyes: Pupils equal, round and reactive to light, extra ocular muscles intact, sclera clear, conjunctiva normal.  HENT: Normocephalic, oral pharynx with moist mucus membranes, good dentition - two chipped teeth. No goiter appreciated.   Respiratory: Clear to auscultation bilaterally, no crackles or wheezing.  Cardiovascular: Regular rate and rhythm, normal S1 and S2, and no murmur noted.  Carotids +2, no bruits. No edema. Palpable pulses to radial  DP and PT arteries.   GI: Normal bowel sounds, soft, non-distended, non-tender, no masses palpated, no hepatosplenomegaly.    Lymph/Hematologic: No cervical lymphadenopathy and no supraclavicular lymphadenopathy.  Genitourinary:  defer  Skin: Warm and dry.  No rashes at anticipated surgical site.   Musculoskeletal: Full ROM of neck. There is no redness, warmth, or swelling of the joints. Gross motor strength is normal.    Neurologic: Awake, alert, oriented to name, place and time. Cranial nerves II-XII are grossly intact. Gait is normal.   Neuropsychiatric: Calm, cooperative. Normal affect.     Prior Labs/Diagnostic Studies   All labs and imaging personally reviewed    Latest Reference Range & Units 05/13/24 07:35   Sodium 135 - 145 mmol/L  139   Potassium 3.4 - 5.3 mmol/L 3.6   Chloride 98 - 107 mmol/L 101   Carbon Dioxide (CO2) 22 - 29 mmol/L 26   Urea Nitrogen 6.0 - 20.0 mg/dL 11.9   Creatinine 0.51 - 0.95 mg/dL 0.77   GFR Estimate >60 mL/min/1.73m2 >90   Calcium 8.6 - 10.0 mg/dL 8.9   Anion Gap 7 - 15 mmol/L 12   Magnesium 1.7 - 2.3 mg/dL 1.5 (L)   Albumin 3.5 - 5.2 g/dL 3.6   Protein Total 6.4 - 8.3 g/dL 6.7   Alkaline Phosphatase 40 - 150 U/L 75   ALT 0 - 50 U/L 22   AST 0 - 45 U/L 20   Bilirubin Total <=1.2 mg/dL 0.3   Glucose 70 - 99 mg/dL 128 (H)   WBC 4.0 - 11.0 10e3/uL 5.9   Hemoglobin 11.7 - 15.7 g/dL 10.7 (L)   Hematocrit 35.0 - 47.0 % 31.9 (L)   Platelet Count 150 - 450 10e3/uL 146 (L)   RBC Count 3.80 - 5.20 10e6/uL 3.51 (L)   MCV 78 - 100 fL 91   MCH 26.5 - 33.0 pg 30.5   MCHC 31.5 - 36.5 g/dL 33.5   RDW 10.0 - 15.0 % 19.3 (H)   % Neutrophils % 82   % Lymphocytes % 8   % Monocytes % 5   % Eosinophils % 3   % Basophils % 1   Absolute Basophils 0.0 - 0.2 10e3/uL 0.0   Absolute Eosinophils 0.0 - 0.7 10e3/uL 0.2   Absolute Immature Granulocytes <=0.4 10e3/uL 0.0   Absolute Lymphocytes 0.8 - 5.3 10e3/uL 0.5 (L)   Absolute Monocytes 0.0 - 1.3 10e3/uL 0.3   % Immature Granulocytes % 1   Absolute Neutrophils 1.6 - 8.3 10e3/uL 4.9   Absolute NRBCs 10e3/uL 0.0   NRBCs per 100 WBC <1 /100 0       EKG/ stress test - if available please see in ROS above       The patient's records and results personally reviewed by this provider.     Outside records reviewed from: Care Everywhere      Assessment    Michelle Mills is a 53 year old female seen as a PAC referral for risk assessment and optimization for anesthesia.    Plan/Recommendations  Pt will be optimized for the proposed procedure.  See below for details on the assessment, risk, and preoperative recommendations    NEUROLOGY  - No history of TIA, CVA or seizure  - Chronic Pain  On chronic opiates, morphine equivilant = 22.5 MME/Day   -Post Op delirium risk factors:  No risk identified    ENT  - No  current airway concerns.  Will need to be reassessed day of surgery.  Mallampati: I  TM: > 3    CARDIAC  - Hypertension  Well controlled  - METS (Metabolic Equivalents)  Patient performs 4 or more METS exercise without symptoms             Total Score: 0      RCRI-Very low risk: Class 1 0.4% complication rate             Total Score: 0        PULMONARY  - Obstructive Sleep Apnea  ANTONIA risk with no formal diagnosis  ANTONIA Medium Risk             Total Score: 4    ANTONIA: Snores loudly    ANTONIA: Hypertension    ANTONIA: BMI over 35 kg/m2    ANTONIA: Over 50 ys old      - Denies asthma or inhaler use  - The patient was counseled to not smoke on the day of surgery  - Tobacco History    History   Smoking Status    Every Day    Types: Cigarettes   Smokeless Tobacco    Never       GI  - Patient having worsening pain with defecation. Followed by her oncology team who counseled her on management of hemorrhoids/anal fissure  PONV Medium Risk  Total Score: 2           1 AN PONV: Pt is Female    1 AN PONV: Intended Post Op Opioids        /RENAL  - Baseline Creatinine  0.77  ~ Cervical cancer - ongoing chemoradiation therapy - procedure as above.     ENDOCRINE    - BMI: Estimated body mass index is 38.92 kg/m  as calculated from the following:    Height as of this encounter: 1.524 m (5').    Weight as of this encounter: 90.4 kg (199 lb 4.8 oz).  Obesity (BMI >30)  - No history of Diabetes Mellitus  - Patient on decadron with chemoradation  ~ Hypomagnesemia - mild at 1.5. The patient had replacement on 5/13/24 and will have recheck labs on 5/20/24 and replacement that day if needed.     HEME  VTE Medium Risk 1.8%             Total Score: 5    VTE: Current cancer      - No history of abnormal bleeding or antiplatelet use.  ~ Mild thrombocytopenia and anemia     ANESTHESIA  ~ The patient has never had anesthesia. She reports issues in family history with waking up with anesthesia. Her grand daughter has had PONV and slow to wake. The patient  denies family history of malignant hyperthermia.     Different anesthesia methods/types have been discussed with the patient, but they are aware that the final plan will be decided by the assigned anesthesia provider on the date of service.    Patient discussed with Dr. Mcduffie who also met with the patient today.     The patient is optimized for their procedure. AVS with information on surgery time/arrival time, meds and NPO status given by nursing staff. No further diagnostic testing indicated.      On the day of service:     Prep time: 12 minutes  Visit time: 11 minutes  Documentation time: 5 minutes  ------------------------------------------  Total time: 28 minutes      Sandra Krishnamurthy PA-C  Preoperative Assessment Center  [facility]  Phone: [phone]  Fax: [phone]

## 2024-05-17 ENCOUNTER — HOSPITAL ENCOUNTER (OUTPATIENT)
Dept: MRI IMAGING | Facility: CLINIC | Age: 54
Discharge: HOME | End: 2024-05-17
Attending: RADIOLOGY | Admitting: RADIOLOGY
Payer: COMMERCIAL

## 2024-05-17 ENCOUNTER — APPOINTMENT (OUTPATIENT)
Dept: RADIATION ONCOLOGY | Facility: CLINIC | Age: 54
End: 2024-05-17
Attending: RADIOLOGY
Payer: COMMERCIAL

## 2024-05-17 DIAGNOSIS — C53.1 MALIGNANT NEOPLASM OF EXOCERVIX (H): ICD-10-CM

## 2024-05-17 PROCEDURE — 72197 MRI PELVIS W/O & W/DYE: CPT

## 2024-05-17 PROCEDURE — 77386 HC IMRT TREATMENT DELIVERY, COMPLEX: CPT | Performed by: RADIOLOGY

## 2024-05-17 PROCEDURE — A9585 GADOBUTROL INJECTION: HCPCS | Performed by: RADIOLOGY

## 2024-05-17 PROCEDURE — 255N000002 HC RX 255 OP 636: Performed by: RADIOLOGY

## 2024-05-17 PROCEDURE — 72197 MRI PELVIS W/O & W/DYE: CPT | Mod: 26 | Performed by: STUDENT IN AN ORGANIZED HEALTH CARE EDUCATION/TRAINING PROGRAM

## 2024-05-17 RX ORDER — GADOBUTROL 604.72 MG/ML
10 INJECTION INTRAVENOUS ONCE
Status: COMPLETED | OUTPATIENT
Start: 2024-05-17 | End: 2024-05-17

## 2024-05-17 RX ADMIN — GADOBUTROL 10 ML: 604.72 INJECTION INTRAVENOUS at 13:20

## 2024-05-20 ENCOUNTER — VIRTUAL VISIT (OUTPATIENT)
Dept: ONCOLOGY | Facility: CLINIC | Age: 54
End: 2024-05-20
Attending: NURSE PRACTITIONER
Payer: COMMERCIAL

## 2024-05-20 ENCOUNTER — APPOINTMENT (OUTPATIENT)
Dept: RADIATION ONCOLOGY | Facility: CLINIC | Age: 54
End: 2024-05-20
Attending: RADIOLOGY
Payer: COMMERCIAL

## 2024-05-20 ENCOUNTER — APPOINTMENT (OUTPATIENT)
Dept: LAB | Facility: CLINIC | Age: 54
End: 2024-05-20
Attending: OBSTETRICS & GYNECOLOGY
Payer: COMMERCIAL

## 2024-05-20 ENCOUNTER — INFUSION THERAPY VISIT (OUTPATIENT)
Dept: ONCOLOGY | Facility: CLINIC | Age: 54
End: 2024-05-20
Attending: OBSTETRICS & GYNECOLOGY
Payer: COMMERCIAL

## 2024-05-20 VITALS
RESPIRATION RATE: 16 BRPM | HEART RATE: 95 BPM | BODY MASS INDEX: 38.28 KG/M2 | SYSTOLIC BLOOD PRESSURE: 143 MMHG | WEIGHT: 196 LBS | OXYGEN SATURATION: 98 % | DIASTOLIC BLOOD PRESSURE: 86 MMHG | TEMPERATURE: 97.9 F

## 2024-05-20 VITALS
DIASTOLIC BLOOD PRESSURE: 88 MMHG | HEART RATE: 78 BPM | SYSTOLIC BLOOD PRESSURE: 140 MMHG | WEIGHT: 196 LBS | BODY MASS INDEX: 38.28 KG/M2

## 2024-05-20 DIAGNOSIS — E83.42 HYPOMAGNESEMIA: ICD-10-CM

## 2024-05-20 DIAGNOSIS — H93.11 TINNITUS, RIGHT: ICD-10-CM

## 2024-05-20 DIAGNOSIS — Z51.11 ENCOUNTER FOR ANTINEOPLASTIC CHEMOTHERAPY: ICD-10-CM

## 2024-05-20 DIAGNOSIS — C53.1 MALIGNANT NEOPLASM OF EXOCERVIX (H): Primary | ICD-10-CM

## 2024-05-20 DIAGNOSIS — K62.89 ANORECTAL PAIN: ICD-10-CM

## 2024-05-20 DIAGNOSIS — R20.2 PARESTHESIA: ICD-10-CM

## 2024-05-20 LAB
ALBUMIN SERPL BCG-MCNC: 3.8 G/DL (ref 3.5–5.2)
ALP SERPL-CCNC: 83 U/L (ref 40–150)
ALT SERPL W P-5'-P-CCNC: 18 U/L (ref 0–50)
ANION GAP SERPL CALCULATED.3IONS-SCNC: 13 MMOL/L (ref 7–15)
AST SERPL W P-5'-P-CCNC: 18 U/L (ref 0–45)
BASOPHILS # BLD AUTO: 0 10E3/UL (ref 0–0.2)
BASOPHILS NFR BLD AUTO: 0 %
BILIRUB SERPL-MCNC: 0.2 MG/DL
BUN SERPL-MCNC: 9.9 MG/DL (ref 6–20)
CALCIUM SERPL-MCNC: 9 MG/DL (ref 8.6–10)
CHLORIDE SERPL-SCNC: 100 MMOL/L (ref 98–107)
CREAT SERPL-MCNC: 0.93 MG/DL (ref 0.51–0.95)
DEPRECATED HCO3 PLAS-SCNC: 26 MMOL/L (ref 22–29)
EGFRCR SERPLBLD CKD-EPI 2021: 73 ML/MIN/1.73M2
EOSINOPHIL # BLD AUTO: 0.1 10E3/UL (ref 0–0.7)
EOSINOPHIL NFR BLD AUTO: 2 %
ERYTHROCYTE [DISTWIDTH] IN BLOOD BY AUTOMATED COUNT: 19.7 % (ref 10–15)
GLUCOSE SERPL-MCNC: 129 MG/DL (ref 70–99)
HCT VFR BLD AUTO: 30.6 % (ref 35–47)
HGB BLD-MCNC: 10.1 G/DL (ref 11.7–15.7)
IMM GRANULOCYTES # BLD: 0 10E3/UL
IMM GRANULOCYTES NFR BLD: 1 %
LYMPHOCYTES # BLD AUTO: 0.3 10E3/UL (ref 0.8–5.3)
LYMPHOCYTES NFR BLD AUTO: 9 %
MAGNESIUM SERPL-MCNC: 1.2 MG/DL (ref 1.7–2.3)
MCH RBC QN AUTO: 30.6 PG (ref 26.5–33)
MCHC RBC AUTO-ENTMCNC: 33 G/DL (ref 31.5–36.5)
MCV RBC AUTO: 93 FL (ref 78–100)
MONOCYTES # BLD AUTO: 0.2 10E3/UL (ref 0–1.3)
MONOCYTES NFR BLD AUTO: 6 %
NEUTROPHILS # BLD AUTO: 2.8 10E3/UL (ref 1.6–8.3)
NEUTROPHILS NFR BLD AUTO: 82 %
NRBC # BLD AUTO: 0 10E3/UL
NRBC BLD AUTO-RTO: 0 /100
PLATELET # BLD AUTO: 116 10E3/UL (ref 150–450)
POTASSIUM SERPL-SCNC: 4 MMOL/L (ref 3.4–5.3)
PROT SERPL-MCNC: 6.9 G/DL (ref 6.4–8.3)
RBC # BLD AUTO: 3.3 10E6/UL (ref 3.8–5.2)
SODIUM SERPL-SCNC: 139 MMOL/L (ref 135–145)
WBC # BLD AUTO: 3.4 10E3/UL (ref 4–11)

## 2024-05-20 PROCEDURE — 96361 HYDRATE IV INFUSION ADD-ON: CPT

## 2024-05-20 PROCEDURE — 99212 OFFICE O/P EST SF 10 MIN: CPT | Mod: 95 | Performed by: NURSE PRACTITIONER

## 2024-05-20 PROCEDURE — 96367 TX/PROPH/DG ADDL SEQ IV INF: CPT

## 2024-05-20 PROCEDURE — 85025 COMPLETE CBC W/AUTO DIFF WBC: CPT | Performed by: OBSTETRICS & GYNECOLOGY

## 2024-05-20 PROCEDURE — 77336 RADIATION PHYSICS CONSULT: CPT | Performed by: RADIOLOGY

## 2024-05-20 PROCEDURE — 80053 COMPREHEN METABOLIC PANEL: CPT | Performed by: OBSTETRICS & GYNECOLOGY

## 2024-05-20 PROCEDURE — 36415 COLL VENOUS BLD VENIPUNCTURE: CPT | Performed by: OBSTETRICS & GYNECOLOGY

## 2024-05-20 PROCEDURE — 250N000011 HC RX IP 250 OP 636: Performed by: NURSE PRACTITIONER

## 2024-05-20 PROCEDURE — 258N000003 HC RX IP 258 OP 636: Performed by: OBSTETRICS & GYNECOLOGY

## 2024-05-20 PROCEDURE — G2211 COMPLEX E/M VISIT ADD ON: HCPCS | Mod: 93 | Performed by: NURSE PRACTITIONER

## 2024-05-20 PROCEDURE — 250N000011 HC RX IP 250 OP 636: Performed by: OBSTETRICS & GYNECOLOGY

## 2024-05-20 PROCEDURE — 77386 HC IMRT TREATMENT DELIVERY, COMPLEX: CPT | Performed by: RADIOLOGY

## 2024-05-20 PROCEDURE — 77427 RADIATION TX MANAGEMENT X5: CPT | Mod: GC | Performed by: RADIOLOGY

## 2024-05-20 PROCEDURE — 96375 TX/PRO/DX INJ NEW DRUG ADDON: CPT

## 2024-05-20 PROCEDURE — 96413 CHEMO IV INFUSION 1 HR: CPT

## 2024-05-20 PROCEDURE — 83735 ASSAY OF MAGNESIUM: CPT | Performed by: OBSTETRICS & GYNECOLOGY

## 2024-05-20 RX ORDER — OMEGA-3 FATTY ACIDS/FISH OIL 300-1000MG
200-400 CAPSULE ORAL EVERY 6 HOURS PRN
Qty: 30 CAPSULE | Refills: 0 | Status: SHIPPED | OUTPATIENT
Start: 2024-05-20

## 2024-05-20 RX ORDER — PALONOSETRON 0.05 MG/ML
0.25 INJECTION, SOLUTION INTRAVENOUS ONCE
Status: COMPLETED | OUTPATIENT
Start: 2024-05-20 | End: 2024-05-20

## 2024-05-20 RX ORDER — MAGNESIUM SULFATE HEPTAHYDRATE 40 MG/ML
2 INJECTION, SOLUTION INTRAVENOUS ONCE
Status: COMPLETED | OUTPATIENT
Start: 2024-05-20 | End: 2024-05-20

## 2024-05-20 RX ORDER — HEPARIN SODIUM (PORCINE) LOCK FLUSH IV SOLN 100 UNIT/ML 100 UNIT/ML
5 SOLUTION INTRAVENOUS
Status: DISCONTINUED | OUTPATIENT
Start: 2024-05-20 | End: 2024-05-20 | Stop reason: HOSPADM

## 2024-05-20 RX ORDER — DEXTROSE, SODIUM CHLORIDE, AND POTASSIUM CHLORIDE 5; .45; .15 G/100ML; G/100ML; G/100ML
2000 INJECTION INTRAVENOUS ONCE
Status: COMPLETED | OUTPATIENT
Start: 2024-05-20 | End: 2024-05-20

## 2024-05-20 RX ADMIN — PALONOSETRON HYDROCHLORIDE 0.25 MG: 0.25 INJECTION INTRAVENOUS at 09:16

## 2024-05-20 RX ADMIN — MAGNESIUM SULFATE 2 G: 2 INJECTION INTRAVENOUS at 09:19

## 2024-05-20 RX ADMIN — CISPLATIN 80 MG: 1 INJECTION INTRAVENOUS at 10:25

## 2024-05-20 RX ADMIN — FOSAPREPITANT: 150 INJECTION, POWDER, LYOPHILIZED, FOR SOLUTION INTRAVENOUS at 08:50

## 2024-05-20 RX ADMIN — POTASSIUM CHLORIDE, DEXTROSE MONOHYDRATE AND SODIUM CHLORIDE 2000 ML: 150; 5; 450 INJECTION, SOLUTION INTRAVENOUS at 09:18

## 2024-05-20 ASSESSMENT — PAIN SCALES - GENERAL: PAINLEVEL: SEVERE PAIN (7)

## 2024-05-20 NOTE — LETTER
2024         RE: Michelle Mills  [address]  [facility] [address]        Dear Colleague,    Thank you for referring your patient, Michelle Mills, to the [facility] RADIATION ONCOLOGY. Please see a copy of my visit note below.    RADIATION ONCOLOGY WEEKLY ON TREATMENT VISIT   Encounter Date: May 20, 2024    Patient Name: Michelle Mills  MRN: [id number]  : 1970     Disease and Stage: Squamous cell carcinoma of the cervix, FIGO IB3   Treatment Site: Pelvis  Current Dose/Planned Total Dose: [3600] cGy / [4500] cGy followed by HDR brachytherapy    Concurrent Chemotherapy: Yes  Drug and Frequency: Weekly cisplatin    Gynecologic Oncologist: Katiuska Edward MD    Subjective: Ms. Mills presents to clinic today for her weekly on-treatment visit. She has blood streaks in her bowel movements. She continues to have sharp and stabbing pain with defecation. She is using hemorroid cream. She has diarrhea and is taking 1 Imodium BID as needed, which is helping. She has no other issues.    Nursing ROS:   Nutrition Alteration  Diet Type: Patient's Preference  Skin  Skin Reaction: 1 - Faint erythema or dry desquamation     ENT and Mouth Exam  Mucositis - Current: 0 - None      Gastrointestinal  Nausea: 0 - None  Diarrhea: 0 - None  Genitourinary  Urinary Status: 0 - Normal     Pain Assessment  0-10 Pain Scale: 0     Objective:   BP (!) 140/88   Pulse 78   Wt 88.9 kg (196 lb)   BMI 38.28 kg/m    Gen: Appears well, NAD  Abdomen: Nondistended  Skin: No erythema    Laboratory:  Lab Results   Component Value Date    WBC 3.4 (L) 2024    HGB 10.1 (L) 2024    HCT 30.6 (L) 2024    MCV 93 2024     (L) 2024     Lab Results   Component Value Date    CR 0.93 2024     Lab Results   Component Value Date     2024    POTASSIUM 4.0 2024    CHLORIDE 100 2024    CO2 26 2024     (H) 2024     Lab Results   Component Value Date    AST  18 05/20/2024    ALT 18 05/20/2024    ALKPHOS 83 05/20/2024    BILITOTAL 0.2 05/20/2024     Magnesium   Date Value Ref Range Status   05/20/2024 1.2 (L) 1.7 - 2.3 mg/dL Final     Treatment-related toxicities (CTCAE v5.0):  Anorexia: Grade 0: No toxicity  Fatigue: Grade 1: Fatigue relieved by rest  Nausea: Grade 0: No toxicity  Pain: Grade 2: Moderate pain; limiting instrumental ADL  Diarrhea: Grade 1: Increase of <4 stools per day over baseline; mild increase in ostomy output compared to baseline  Urinary frequency: Grade 1: Present  Urinary tract pain: Grade 1: Mild pain  Urinary urgency: Grade 0: No toxicity  Dermatitis: Grade 0: No toxicity    ED visits/Hospitalizations:  None    Missed Treatments:  None    Mosaiq chart and setup information reviewed  IGRT images reviewed    Assessment:    Ms. Mills is a 53 year old female with a squamous cell carcinoma of the cervix, FIGO IB3, with tumor size greater than 4 cm, who is receiving definitive chemoradiation. She is tolerating radiation well.    Plan:   Continue radiation as planned  Continue Imodium  Reviewed skin hygiene, moisturizer  ContinueTucks wipes and barrier cream with either dimethicone or Cavilon.  Continue squirt bottle to aid with urination burning.  Vaginal discharge is expected with early chemoRT, will ctm  6.   Patient has agreed to undergo anesthesia for Kevan sleeve placement tomorrow followed by 5 tandem and ring placements. She understands need for garcia catheter for procedures.    Linda Espino MD PGY4    Physician Attestation  Ms. Mills was seen and examined by me. I agree with the findings and plan of care as documented in the note. Note above by Dr. Espino  was reviewed and edited by me and reflects our mutual findings and plan of care.  I personally reviewed the available treatment setup images and vital signs listed.     Dede Henry MD  Department of Radiation Oncology  [facility]            Again,  thank you for allowing me to participate in the care of your patient.        Sincerely,        Dede Henry MD

## 2024-05-20 NOTE — PROGRESS NOTES
Virtual Visit Details    Type of service:  Telephone   Originating Location (pt. Location): Home  Distant Location (provider location):  off site  Platform used for Telephone Visit: [employer]  Phone call duration: 10 minutes ([id number])    Gynecologic Oncology Follow Up Note    RE: Michelle Mills   : 1970  PRONOUNS: she/her/hers  HELGA: 2024  GYNECOLOGIC ONCOLOGIST: Katiuska Edward MD    CC: Michelle Mills is a 53 year old female with stage IB3 cervical cancer presenting for a toxicity check while undergoing cisplatin 40mg/m2 IV weekly as radiotherapy sensitizer    INTERVAL HISTORY:    Michelle presents for a billable telephone visit prior to C1D29 cisplatin. She denies fevers, chills, respiratory concerns, vomiting, or bladder concerns. Denies vaginal bleeding. Eating and drinking well.     Some ringing in the R ear, comes and goes, mild in intensity, not impacting sleep or well being.    Sometimes feels some numbness in the fingers and toes, intermittent, no falls or functional impact, no pain.    Continues with pain with defecation, improved with scheduling Tylenol and ibuprofen (taking 400mg twice daily). Occasional blood surrounding the stool. Generally stable. Occasional diarrhea, intermittent, denies need for intervention.    Minimal nausea- taking dexamethasone 8mg for three days after each chemo dose, denies any adverse effects with this and would like to continue this. Not needing additional anti-emetics.          ONCOLOGY HISTORY:  24 noted a friable cervix  Pap: ASC-US, hrHPV 16 positive     24 Pelvic US - endometrial stripe 9 mm     3/1/24 Colposcopy - anterior cervix enlarged, cx biopsy taken and endometrial biopsy  Pathology cervix at 1 o'clock squamous cell carcinoma extending to resection margin  ECC:fragments of squamous cell carcinoma, papillary variant  EMBx: special fragment of squamous cell carcinoma     3/22/24 PET CT  CERVIX 7.4 X 4.6 cm, SUV 19.8, right common vivian 1.3  cm x 1.1 cm, low level SUV 3.1 cm,     3/28/24 MRI Pelvic  Large mass confined to the cervix, without extension to parametrial or lower vagina    4/23/24: Began treatment with cisplatin 40mg/m2 IV weekly + radiation    Past Medical History:   Diagnosis Date    Anemia     HTN (hypertension)     Hypomagnesemia     Malignant neoplasm of overlapping sites of cervix (H)     Thrombocytopenia (H24)     Tobacco abuse       No past surgical history on file.  Social History     Socioeconomic History    Marital status: Single   Tobacco Use    Smoking status: Every Day     Types: Cigarettes     Passive exposure: Current    Smokeless tobacco: Never      Family History   Problem Relation Age of Onset    Anesthesia Reaction Granddaughter         difficulty coming out of anesthesia, PONV, slow to wake    Malignant Hyperthermia No family hx of     Venous thrombosis No family hx of       Current Outpatient Medications   Medication Sig Dispense Refill    acetaminophen (TYLENOL) 500 MG tablet Take 500-1,000 mg by mouth every 6 hours as needed for mild pain      chlorthalidone (HYGROTON) 25 MG tablet Take 1 tablet (25 mg) by mouth daily (Patient taking differently: Take 25 mg by mouth every morning) 60 tablet 0    dexAMETHasone (DECADRON) 4 MG tablet Take 2 tablets (8 mg) by mouth daily Take for 3 days, starting the day after chemo on day 2 and 9. Take with food. If no nausea and vomiting with first cisplatin doses, may stop dexamethasone. 12 tablet 2    hydrocortisone (CORTAID) 1 % external cream Apply topically 2 times daily (Patient taking differently: Apply topically as needed) 14 g 1    hydrocortisone-pramoxine (ANALPRAM-HC) rectal cream Place rectally 2 times daily (Patient taking differently: Place rectally as needed) 30 g 1    ibuprofen (ADVIL/MOTRIN) 200 MG capsule Take 1-2 capsules (200-400 mg) by mouth every 6 hours as needed for moderate pain, fever or inflammatory pain 30 capsule 0    multivitamin, therapeutic (THERA-VIT)  TABS tablet Take 1 tablet by mouth daily      ondansetron (ZOFRAN) 8 MG tablet Take 1 tablet (8 mg) by mouth every 8 hours as needed for nausea (vomiting) (Patient not taking: Reported on 4/29/2024) 30 tablet 2    oxyCODONE (ROXICODONE) 5 MG tablet Take 1 tablet (5 mg) by mouth every 8 hours as needed for severe pain 10 tablet 0    prochlorperazine (COMPAZINE) 10 MG tablet Take 1 tablet (10 mg) by mouth every 6 hours as needed for nausea or vomiting (Patient not taking: Reported on 4/29/2024) 30 tablet 2    senna-docusate (SENOKOT-S/PERICOLACE) 8.6-50 MG tablet Take 1-2 tablets by mouth 2 times daily as needed for constipation (Patient not taking: Reported on 5/16/2024) 30 tablet 0    vitamin D2 (ERGOCALCIFEROL) 72815 units (1250 mcg) capsule 1 capsule with meals Orally Once weekly for 90 days (Patient not taking: Reported on 5/16/2024)      witch hazel-glycerin (TUCKS) pad Apply topically as needed for hemorrhoids 50 each 1     No current facility-administered medications for this visit.     Facility-Administered Medications Ordered in Other Visits   Medication Dose Route Frequency Provider Last Rate Last Admin    dextrose 5% and 0.45% NaCl + KCl 20 mEq/L BOLUS 2,000 mL  2,000 mL Intravenous Once Katiuska Edward MD        fosaprepitant (EMEND) 150 mg, dexAMETHasone (DECADRON) 12 mg in sodium chloride 0.9 % 276.2 mL intermittent infusion   Intravenous Once Katiuska Edward MD        magnesium sulfate 2 g in 50 mL sterile water intermittent infusion  2 g Intravenous Once Jayda Calvo APRN CNP        palonosetron (ALOXI) injection 0.25 mg  0.25 mg Intravenous Once Katiuska Edward MD          Allergies   Allergen Reactions    Codeine      Other Reaction(s): Unknown          OBJECTIVE:    PHYSICAL EXAM:  No vitals taken- virtual visit  Constitutional: Alert and oriented non-toxic sounding female in no acute distress  Resp: speaking in full sentences without apparent dyspnea, wheezing, or cough  Psych: Pleasant  and interactive, answers questions appropriately, thought content logical    Labs from 5/20/24 reviewed  Recent Results (from the past 24 hour(s))   Comprehensive metabolic panel    Collection Time: 05/20/24  8:00 AM   Result Value Ref Range    Sodium 139 135 - 145 mmol/L    Potassium 4.0 3.4 - 5.3 mmol/L    Carbon Dioxide (CO2) 26 22 - 29 mmol/L    Anion Gap 13 7 - 15 mmol/L    Urea Nitrogen 9.9 6.0 - 20.0 mg/dL    Creatinine 0.93 0.51 - 0.95 mg/dL    GFR Estimate 73 >60 mL/min/1.73m2    Calcium 9.0 8.6 - 10.0 mg/dL    Chloride 100 98 - 107 mmol/L    Glucose 129 (H) 70 - 99 mg/dL    Alkaline Phosphatase 83 40 - 150 U/L    AST 18 0 - 45 U/L    ALT 18 0 - 50 U/L    Protein Total 6.9 6.4 - 8.3 g/dL    Albumin 3.8 3.5 - 5.2 g/dL    Bilirubin Total 0.2 <=1.2 mg/dL   Magnesium    Collection Time: 05/20/24  8:00 AM   Result Value Ref Range    Magnesium 1.2 (L) 1.7 - 2.3 mg/dL   CBC with platelets and differential    Collection Time: 05/20/24  8:00 AM   Result Value Ref Range    WBC Count 3.4 (L) 4.0 - 11.0 10e3/uL    RBC Count 3.30 (L) 3.80 - 5.20 10e6/uL    Hemoglobin 10.1 (L) 11.7 - 15.7 g/dL    Hematocrit 30.6 (L) 35.0 - 47.0 %    MCV 93 78 - 100 fL    MCH 30.6 26.5 - 33.0 pg    MCHC 33.0 31.5 - 36.5 g/dL    RDW 19.7 (H) 10.0 - 15.0 %    Platelet Count 116 (L) 150 - 450 10e3/uL    % Neutrophils 82 %    % Lymphocytes 9 %    % Monocytes 6 %    % Eosinophils 2 %    % Basophils 0 %    % Immature Granulocytes 1 %    NRBCs per 100 WBC 0 <1 /100    Absolute Neutrophils 2.8 1.6 - 8.3 10e3/uL    Absolute Lymphocytes 0.3 (L) 0.8 - 5.3 10e3/uL    Absolute Monocytes 0.2 0.0 - 1.3 10e3/uL    Absolute Eosinophils 0.1 0.0 - 0.7 10e3/uL    Absolute Basophils 0.0 0.0 - 0.2 10e3/uL    Absolute Immature Granulocytes 0.0 <=0.4 10e3/uL    Absolute NRBCs 0.0 10e3/uL           ASSESSMENT/PLAN:    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 Continue treatment  with cisplatin sensitizer 40mg/m2 IV weekly x5 weeks per Dr. Edward's plan, to be given in conjunction with pelvic radiation- may proceed with C1D29 today. To see me back on 5/28/24 for follow up with labs, to have infusion appt scheduled for possible fluids/electrolyte replacement. To follow up with gyn onc NIKKO one month after radiation, PET/CT and follow up with Dr. Edward three months after radiation. Reviewed alarm signs and symptoms and when to seek further care.     TREATMENT/DISEASE EFFECTS:  Tinnitus: Mild and intermittent, continue to monitor. No indication for dose adjustment at this time. Consider audiogram in the future if symptoms persist.  Sensory neuropathy: Reports mild and intermittent paresthesias in the fingers and toes, not causing pain or functional impact. No indication for dose adjustment at this time. Continue to monitor.  Nausea: Mild, well controlled with scheduled anti-emetics with chemotherapy and dexamethasone 3 days post-treatment  Hypomagnesemia: Likely related to cisplatin. Replace in infusion per protocol.  Diarrhea: Mild and infrequent, denies need for intervention.  Anorectal pain: Improved with scheduled Tylenol and ibuprofen- renal function and platelets okay to continue with ibuprofen 400mg PO BID.      Patient verbalized understanding of and agreement with plan    MDM:  10 minutes of phone visit    The longitudinal plan of care for the diagnosis(es)/condition(s) as documented were addressed during this visit. Due to the added complexity in care, I will continue to support Michelle in the subsequent management and with ongoing continuity of care.    ANDREW Brown, CNP (she/her)  Division of Gynecologic Oncology

## 2024-05-20 NOTE — PROGRESS NOTES
RADIATION ONCOLOGY WEEKLY ON TREATMENT VISIT   Encounter Date: May 20, 2024    Patient Name: Michelle Mills  MRN: [id number]  : 1970     Disease and Stage: Squamous cell carcinoma of the cervix, FIGO IB3   Treatment Site: Pelvis  Current Dose/Planned Total Dose: [3600] cGy / [4500] cGy followed by HDR brachytherapy    Concurrent Chemotherapy: Yes  Drug and Frequency: Weekly cisplatin    Gynecologic Oncologist: Katiuska Edward MD    Subjective: Ms. Mills presents to clinic today for her weekly on-treatment visit. She has blood streaks in her bowel movements. She continues to have sharp and stabbing pain with defecation. She is using hemorroid cream. She has diarrhea and is taking 1 Imodium BID as needed, which is helping. She has no other issues.    Nursing ROS:   Nutrition Alteration  Diet Type: Patient's Preference  Skin  Skin Reaction: 1 - Faint erythema or dry desquamation     ENT and Mouth Exam  Mucositis - Current: 0 - None      Gastrointestinal  Nausea: 0 - None  Diarrhea: 0 - None  Genitourinary  Urinary Status: 0 - Normal     Pain Assessment  0-10 Pain Scale: 0     Objective:   BP (!) 140/88   Pulse 78   Wt 88.9 kg (196 lb)   BMI 38.28 kg/m    Gen: Appears well, NAD  Abdomen: Nondistended  Skin: No erythema    Laboratory:  Lab Results   Component Value Date    WBC 3.4 (L) 2024    HGB 10.1 (L) 2024    HCT 30.6 (L) 2024    MCV 93 2024     (L) 2024     Lab Results   Component Value Date    CR 0.93 2024     Lab Results   Component Value Date     2024    POTASSIUM 4.0 2024    CHLORIDE 100 2024    CO2 26 2024     (H) 2024     Lab Results   Component Value Date    AST 18 2024    ALT 18 2024    ALKPHOS 83 2024    BILITOTAL 0.2 2024     Magnesium   Date Value Ref Range Status   2024 1.2 (L) 1.7 - 2.3 mg/dL Final     Treatment-related toxicities (CTCAE v5.0):  Anorexia: Grade 0: No  toxicity  Fatigue: Grade 1: Fatigue relieved by rest  Nausea: Grade 0: No toxicity  Pain: Grade 2: Moderate pain; limiting instrumental ADL  Diarrhea: Grade 1: Increase of <4 stools per day over baseline; mild increase in ostomy output compared to baseline  Urinary frequency: Grade 1: Present  Urinary tract pain: Grade 1: Mild pain  Urinary urgency: Grade 0: No toxicity  Dermatitis: Grade 0: No toxicity    ED visits/Hospitalizations:  None    Missed Treatments:  None    Mosaiq chart and setup information reviewed  IGRT images reviewed    Assessment:    Ms. Mills is a 53 year old female with a squamous cell carcinoma of the cervix, FIGO IB3, with tumor size greater than 4 cm, who is receiving definitive chemoradiation. She is tolerating radiation well.    Plan:   Continue radiation as planned  Continue Imodium  Reviewed skin hygiene, moisturizer  ContinueTucks wipes and barrier cream with either dimethicone or Cavilon.  Continue squirt bottle to aid with urination burning.  Vaginal discharge is expected with early chemoRT, will ctm  6.   Patient has agreed to undergo anesthesia for Kevan sleeve placement tomorrow followed by 5 tandem and ring placements. She understands need for garcia catheter for procedures.    Linda Espino MD PGY4    Physician Attestation  Ms. Mills was seen and examined by me. I agree with the findings and plan of care as documented in the note. Note above by Dr. Espino  was reviewed and edited by me and reflects our mutual findings and plan of care.  I personally reviewed the available treatment setup images and vital signs listed.     Dede Henry MD  Department of Radiation Oncology  [facility]

## 2024-05-20 NOTE — PROGRESS NOTES
Infusion Nursing Note:  Michelle Mills presents today for C1D29 Cisplatin/Magnesium replacement.    Patient seen by provider today: No   present during visit today: Not Applicable.    Note: Patient endorses intermittent right ear ringing, not worsening. Denies pain. States intermittent b/l hand and feet neuropathy, no change.  Instruction given to patient as per provider. Verbalized understanding. No intervention needed for pain today.     Appointment is not made yet by the time patient left the facility. Instructed patient is unable to see next few days to call . Verbalized understanding.       Intravenous Access:  Peripheral IV placed.    Treatment Conditions:  Lab Results   Component Value Date    HGB 10.1 (L) 05/20/2024    WBC 3.4 (L) 05/20/2024    ANEUTAUTO 2.8 05/20/2024     (L) 05/20/2024        Lab Results   Component Value Date     05/20/2024    POTASSIUM 4.0 05/20/2024    MAG 1.2 (L) 05/20/2024    CR 0.93 05/20/2024    IVON 9.0 05/20/2024    BILITOTAL 0.2 05/20/2024    ALBUMIN 3.8 05/20/2024    ALT 18 05/20/2024    AST 18 05/20/2024       Results reviewed, labs MET treatment parameters, ok to proceed with treatment.    TORB: 5/20/24/0815H/Jayda Arroyo NP/Mansi Rivera RN/ symptoms notes. Please schedule patient for virtual visit today at 0830.    TORB: 5/20/24/Jayda Arroyo NP/Mansi Rivera RN/Good to go ahead with treatment today. Will confirm with Dr. Edward for D36.       Post Infusion Assessment:  Patient tolerated infusion without incident.  Blood return noted pre and post infusion.  Site patent and intact, free from redness, edema or discomfort.  No evidence of extravasations.  Access discontinued per protocol.       Discharge Plan:   Prescription refills given for Ibuprofen and Dexamethasone.  Discharge instructions reviewed with: Patient.  Patient and/or family verbalized understanding of discharge instructions and all questions answered.  AVS to patient  via [place].  Patient will return next week for next appointment. See notes above.  Patient discharged in stable condition accompanied by: self.  Departure Mode: Ambulatory.      ANGELIA PATEL RN

## 2024-05-20 NOTE — LETTER
2024         RE: Michelle Mills  [address]  [facility] [address]        Dear Colleague,    Thank you for referring your patient, Michelle Mills, to the [facility]. Please see a copy of my visit note below.        Virtual Visit Details    Type of service:  Telephone   Originating Location (pt. Location): Home  Distant Location (provider location):  off site  Platform used for Telephone Visit: Doodle  Phone call duration: 10 minutes ([phone])    Gynecologic Oncology Follow Up Note    RE: Michelle Mills   : 1970  PRONOUNS: she/her/hers  HELGA: 2024  GYNECOLOGIC ONCOLOGIST: Katiuska Edward MD    CC: Michelle Mills is a 53 year old female with stage IB3 cervical cancer presenting for a toxicity check while undergoing cisplatin 40mg/m2 IV weekly as radiotherapy sensitizer    INTERVAL HISTORY:    Michelle presents for a billable telephone visit prior to C1D29 cisplatin. She denies fevers, chills, respiratory concerns, vomiting, or bladder concerns. Denies vaginal bleeding. Eating and drinking well.     Some ringing in the R ear, comes and goes, mild in intensity, not impacting sleep or well being.    Sometimes feels some numbness in the fingers and toes, intermittent, no falls or functional impact, no pain.    Continues with pain with defecation, improved with scheduling Tylenol and ibuprofen (taking 400mg twice daily). Occasional blood surrounding the stool. Generally stable. Occasional diarrhea, intermittent, denies need for intervention.    Minimal nausea- taking dexamethasone 8mg for three days after each chemo dose, denies any adverse effects with this and would like to continue this. Not needing additional anti-emetics.          ONCOLOGY HISTORY:  24 noted a friable cervix  Pap: ASC-US, hrHPV 16 positive     24 Pelvic US - endometrial stripe 9 mm     3/1/24 Colposcopy - anterior cervix enlarged, cx biopsy taken and endometrial biopsy  Pathology  cervix at 1 o'clock squamous cell carcinoma extending to resection margin  ECC:fragments of squamous cell carcinoma, papillary variant  EMBx: special fragment of squamous cell carcinoma     3/22/24 PET CT  CERVIX 7.4 X 4.6 cm, SUV 19.8, right common vivian 1.3 cm x 1.1 cm, low level SUV 3.1 cm,     3/28/24 MRI Pelvic  Large mass confined to the cervix, without extension to parametrial or lower vagina    4/23/24: Began treatment with cisplatin 40mg/m2 IV weekly + radiation    Past Medical History:   Diagnosis Date     Anemia      HTN (hypertension)      Hypomagnesemia      Malignant neoplasm of overlapping sites of cervix (H)      Thrombocytopenia (H24)      Tobacco abuse       No past surgical history on file.  Social History     Socioeconomic History     Marital status: Single   Tobacco Use     Smoking status: Every Day     Types: Cigarettes     Passive exposure: Current     Smokeless tobacco: Never      Family History   Problem Relation Age of Onset     Anesthesia Reaction Granddaughter         difficulty coming out of anesthesia, PONV, slow to wake     Malignant Hyperthermia No family hx of      Venous thrombosis No family hx of       Current Outpatient Medications   Medication Sig Dispense Refill     acetaminophen (TYLENOL) 500 MG tablet Take 500-1,000 mg by mouth every 6 hours as needed for mild pain       chlorthalidone (HYGROTON) 25 MG tablet Take 1 tablet (25 mg) by mouth daily (Patient taking differently: Take 25 mg by mouth every morning) 60 tablet 0     dexAMETHasone (DECADRON) 4 MG tablet Take 2 tablets (8 mg) by mouth daily Take for 3 days, starting the day after chemo on day 2 and 9. Take with food. If no nausea and vomiting with first cisplatin doses, may stop dexamethasone. 12 tablet 2     hydrocortisone (CORTAID) 1 % external cream Apply topically 2 times daily (Patient taking differently: Apply topically as needed) 14 g 1     hydrocortisone-pramoxine (ANALPRAM-HC) rectal cream Place rectally 2 times  daily (Patient taking differently: Place rectally as needed) 30 g 1     ibuprofen (ADVIL/MOTRIN) 200 MG capsule Take 1-2 capsules (200-400 mg) by mouth every 6 hours as needed for moderate pain, fever or inflammatory pain 30 capsule 0     multivitamin, therapeutic (THERA-VIT) TABS tablet Take 1 tablet by mouth daily       ondansetron (ZOFRAN) 8 MG tablet Take 1 tablet (8 mg) by mouth every 8 hours as needed for nausea (vomiting) (Patient not taking: Reported on 4/29/2024) 30 tablet 2     oxyCODONE (ROXICODONE) 5 MG tablet Take 1 tablet (5 mg) by mouth every 8 hours as needed for severe pain 10 tablet 0     prochlorperazine (COMPAZINE) 10 MG tablet Take 1 tablet (10 mg) by mouth every 6 hours as needed for nausea or vomiting (Patient not taking: Reported on 4/29/2024) 30 tablet 2     senna-docusate (SENOKOT-S/PERICOLACE) 8.6-50 MG tablet Take 1-2 tablets by mouth 2 times daily as needed for constipation (Patient not taking: Reported on 5/16/2024) 30 tablet 0     vitamin D2 (ERGOCALCIFEROL) 35180 units (1250 mcg) capsule 1 capsule with meals Orally Once weekly for 90 days (Patient not taking: Reported on 5/16/2024)       witch hazel-glycerin (TUCKS) pad Apply topically as needed for hemorrhoids 50 each 1     No current facility-administered medications for this visit.     Facility-Administered Medications Ordered in Other Visits   Medication Dose Route Frequency Provider Last Rate Last Admin     dextrose 5% and 0.45% NaCl + KCl 20 mEq/L BOLUS 2,000 mL  2,000 mL Intravenous Once Katiuska Edward MD         fosaprepitant (EMEND) 150 mg, dexAMETHasone (DECADRON) 12 mg in sodium chloride 0.9 % 276.2 mL intermittent infusion   Intravenous Once Katiuska Edward MD         magnesium sulfate 2 g in 50 mL sterile water intermittent infusion  2 g Intravenous Once Jayda Calvo APRN CNP         palonosetron (ALOXI) injection 0.25 mg  0.25 mg Intravenous Once Katiuska Edward MD          Allergies   Allergen Reactions      Codeine      Other Reaction(s): Unknown          OBJECTIVE:    PHYSICAL EXAM:  No vitals taken- virtual visit  Constitutional: Alert and oriented non-toxic sounding female in no acute distress  Resp: speaking in full sentences without apparent dyspnea, wheezing, or cough  Psych: Pleasant and interactive, answers questions appropriately, thought content logical    Labs from 5/20/24 reviewed  Recent Results (from the past 24 hour(s))   Comprehensive metabolic panel    Collection Time: 05/20/24  8:00 AM   Result Value Ref Range    Sodium 139 135 - 145 mmol/L    Potassium 4.0 3.4 - 5.3 mmol/L    Carbon Dioxide (CO2) 26 22 - 29 mmol/L    Anion Gap 13 7 - 15 mmol/L    Urea Nitrogen 9.9 6.0 - 20.0 mg/dL    Creatinine 0.93 0.51 - 0.95 mg/dL    GFR Estimate 73 >60 mL/min/1.73m2    Calcium 9.0 8.6 - 10.0 mg/dL    Chloride 100 98 - 107 mmol/L    Glucose 129 (H) 70 - 99 mg/dL    Alkaline Phosphatase 83 40 - 150 U/L    AST 18 0 - 45 U/L    ALT 18 0 - 50 U/L    Protein Total 6.9 6.4 - 8.3 g/dL    Albumin 3.8 3.5 - 5.2 g/dL    Bilirubin Total 0.2 <=1.2 mg/dL   Magnesium    Collection Time: 05/20/24  8:00 AM   Result Value Ref Range    Magnesium 1.2 (L) 1.7 - 2.3 mg/dL   CBC with platelets and differential    Collection Time: 05/20/24  8:00 AM   Result Value Ref Range    WBC Count 3.4 (L) 4.0 - 11.0 10e3/uL    RBC Count 3.30 (L) 3.80 - 5.20 10e6/uL    Hemoglobin 10.1 (L) 11.7 - 15.7 g/dL    Hematocrit 30.6 (L) 35.0 - 47.0 %    MCV 93 78 - 100 fL    MCH 30.6 26.5 - 33.0 pg    MCHC 33.0 31.5 - 36.5 g/dL    RDW 19.7 (H) 10.0 - 15.0 %    Platelet Count 116 (L) 150 - 450 10e3/uL    % Neutrophils 82 %    % Lymphocytes 9 %    % Monocytes 6 %    % Eosinophils 2 %    % Basophils 0 %    % Immature Granulocytes 1 %    NRBCs per 100 WBC 0 <1 /100    Absolute Neutrophils 2.8 1.6 - 8.3 10e3/uL    Absolute Lymphocytes 0.3 (L) 0.8 - 5.3 10e3/uL    Absolute Monocytes 0.2 0.0 - 1.3 10e3/uL    Absolute Eosinophils 0.1 0.0 - 0.7 10e3/uL    Absolute  Basophils 0.0 0.0 - 0.2 10e3/uL    Absolute Immature Granulocytes 0.0 <=0.4 10e3/uL    Absolute NRBCs 0.0 10e3/uL           ASSESSMENT/PLAN:    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 Continue treatment with cisplatin sensitizer 40mg/m2 IV weekly x5 weeks per Dr. Edward's plan, to be given in conjunction with pelvic radiation- may proceed with C1D29 today. Reviewed alarm signs and symptoms and when to seek further care. Message sent to [facility] for follow up plan.    TREATMENT/DISEASE EFFECTS:  Tinnitus: Mild and intermittent, continue to monitor. No indication for dose adjustment at this time. Consider audiogram in the future if symptoms persist.  Sensory neuropathy: Reports mild and intermittent paresthesias in the fingers and toes, not causing pain or functional impact. No indication for dose adjustment at this time. Continue to monitor.  Nausea: Mild, well controlled with scheduled anti-emetics with chemotherapy and dexamethasone 3 days post-treatment  Hypomagnesemia: Likely related to cisplatin. Replace in infusion per protocol.  Diarrhea: Mild and infrequent, denies need for intervention.  Anorectal pain: Improved with scheduled Tylenol and ibuprofen- renal function and platelets okay to continue with ibuprofen 400mg PO BID.      Patient verbalized understanding of and agreement with plan    MDM:  10 minutes of phone visit    The longitudinal plan of care for the diagnosis(es)/condition(s) as documented were addressed during this visit. Due to the added complexity in care, I will continue to support Michelle in the subsequent management and with ongoing continuity of care.    ANDREW Brown, CNP (she/her)  Division of Gynecologic Oncology           Again, thank you for allowing me to participate in the care of your patient.        Sincerely,        ANDREW Brown CNP

## 2024-05-20 NOTE — NURSING NOTE
Chief Complaint   Patient presents with    Blood Draw     Labs drawn via PIV by RN in lab.  VS taken       Labs drawn from PIV placed by RN. Line flushed with saline. Vitals taken. Pt checked in for appointment(s).    Tammy Brunson RN

## 2024-05-21 ENCOUNTER — HOSPITAL ENCOUNTER (OUTPATIENT)
Dept: ULTRASOUND IMAGING | Facility: CLINIC | Age: 54
Discharge: HOME | End: 2024-05-21
Attending: RADIOLOGY | Admitting: RADIOLOGY
Payer: COMMERCIAL

## 2024-05-21 ENCOUNTER — APPOINTMENT (OUTPATIENT)
Dept: RADIATION ONCOLOGY | Facility: CLINIC | Age: 54
End: 2024-05-21
Attending: RADIOLOGY
Payer: COMMERCIAL

## 2024-05-21 ENCOUNTER — HOSPITAL ENCOUNTER (OUTPATIENT)
Facility: CLINIC | Age: 54
Discharge: HOME | End: 2024-05-21
Attending: RADIOLOGY | Admitting: RADIOLOGY
Payer: COMMERCIAL

## 2024-05-21 ENCOUNTER — OFFICE VISIT (OUTPATIENT)
Dept: SURGERY | Facility: CLINIC | Age: 54
End: 2024-05-21
Payer: COMMERCIAL

## 2024-05-21 ENCOUNTER — ANESTHESIA (OUTPATIENT)
Dept: SURGERY | Facility: CLINIC | Age: 54
End: 2024-05-21
Payer: COMMERCIAL

## 2024-05-21 VITALS
DIASTOLIC BLOOD PRESSURE: 99 MMHG | HEIGHT: 60 IN | RESPIRATION RATE: 14 BRPM | BODY MASS INDEX: 39 KG/M2 | OXYGEN SATURATION: 100 % | WEIGHT: 198.64 LBS | HEART RATE: 72 BPM | SYSTOLIC BLOOD PRESSURE: 154 MMHG | TEMPERATURE: 97.9 F

## 2024-05-21 VITALS
HEART RATE: 72 BPM | RESPIRATION RATE: 14 BRPM | SYSTOLIC BLOOD PRESSURE: 154 MMHG | WEIGHT: 198.63 LBS | BODY MASS INDEX: 39 KG/M2 | HEIGHT: 60 IN | OXYGEN SATURATION: 100 % | TEMPERATURE: 97.9 F | DIASTOLIC BLOOD PRESSURE: 99 MMHG

## 2024-05-21 DIAGNOSIS — S30.817A PERIANAL EXCORIATION: Primary | ICD-10-CM

## 2024-05-21 DIAGNOSIS — C53.9 CERVICAL CANCER (H): ICD-10-CM

## 2024-05-21 PROCEDURE — 57155 INSERT UTERI TANDEM/OVOIDS: CPT | Performed by: ANESTHESIOLOGY

## 2024-05-21 PROCEDURE — 370N000017 HC ANESTHESIA TECHNICAL FEE, PER MIN: Performed by: RADIOLOGY

## 2024-05-21 PROCEDURE — 99202 OFFICE O/P NEW SF 15 MIN: CPT | Performed by: NURSE PRACTITIONER

## 2024-05-21 PROCEDURE — 250N000011 HC RX IP 250 OP 636: Performed by: STUDENT IN AN ORGANIZED HEALTH CARE EDUCATION/TRAINING PROGRAM

## 2024-05-21 PROCEDURE — 999N000063 US INTRAOPERATIVE

## 2024-05-21 PROCEDURE — 57155 INSERT UTERI TANDEM/OVOIDS: CPT | Performed by: STUDENT IN AN ORGANIZED HEALTH CARE EDUCATION/TRAINING PROGRAM

## 2024-05-21 PROCEDURE — 272N000001 HC OR GENERAL SUPPLY STERILE: Performed by: RADIOLOGY

## 2024-05-21 PROCEDURE — 258N000003 HC RX IP 258 OP 636: Performed by: STUDENT IN AN ORGANIZED HEALTH CARE EDUCATION/TRAINING PROGRAM

## 2024-05-21 PROCEDURE — 250N000009 HC RX 250: Performed by: STUDENT IN AN ORGANIZED HEALTH CARE EDUCATION/TRAINING PROGRAM

## 2024-05-21 PROCEDURE — 360N000076 HC SURGERY LEVEL 3, PER MIN: Performed by: RADIOLOGY

## 2024-05-21 PROCEDURE — 710N000012 HC RECOVERY PHASE 2, PER MINUTE: Performed by: RADIOLOGY

## 2024-05-21 PROCEDURE — 250N000025 HC SEVOFLURANE, PER MIN: Performed by: RADIOLOGY

## 2024-05-21 PROCEDURE — 710N000010 HC RECOVERY PHASE 1, LEVEL 2, PER MIN: Performed by: RADIOLOGY

## 2024-05-21 PROCEDURE — 999N000141 HC STATISTIC PRE-PROCEDURE NURSING ASSESSMENT: Performed by: RADIOLOGY

## 2024-05-21 PROCEDURE — 250N000011 HC RX IP 250 OP 636: Performed by: RADIOLOGY

## 2024-05-21 PROCEDURE — 77386 HC IMRT TREATMENT DELIVERY, COMPLEX: CPT | Performed by: RADIOLOGY

## 2024-05-21 RX ORDER — ONDANSETRON 2 MG/ML
4 INJECTION INTRAMUSCULAR; INTRAVENOUS EVERY 30 MIN PRN
Status: DISCONTINUED | OUTPATIENT
Start: 2024-05-21 | End: 2024-05-21

## 2024-05-21 RX ORDER — HYDROMORPHONE HYDROCHLORIDE 1 MG/ML
0.2 INJECTION, SOLUTION INTRAMUSCULAR; INTRAVENOUS; SUBCUTANEOUS EVERY 5 MIN PRN
Status: DISCONTINUED | OUTPATIENT
Start: 2024-05-21 | End: 2024-05-27 | Stop reason: HOSPADM

## 2024-05-21 RX ORDER — DEXAMETHASONE SODIUM PHOSPHATE 4 MG/ML
4 INJECTION, SOLUTION INTRA-ARTICULAR; INTRALESIONAL; INTRAMUSCULAR; INTRAVENOUS; SOFT TISSUE
Status: DISCONTINUED | OUTPATIENT
Start: 2024-05-21 | End: 2024-05-27 | Stop reason: HOSPADM

## 2024-05-21 RX ORDER — OXYCODONE HYDROCHLORIDE 10 MG/1
10 TABLET ORAL
Status: DISCONTINUED | OUTPATIENT
Start: 2024-05-21 | End: 2024-05-27 | Stop reason: HOSPADM

## 2024-05-21 RX ORDER — SODIUM CHLORIDE, SODIUM LACTATE, POTASSIUM CHLORIDE, CALCIUM CHLORIDE 600; 310; 30; 20 MG/100ML; MG/100ML; MG/100ML; MG/100ML
INJECTION, SOLUTION INTRAVENOUS CONTINUOUS PRN
Status: DISCONTINUED | OUTPATIENT
Start: 2024-05-21 | End: 2024-05-21

## 2024-05-21 RX ORDER — HYDROMORPHONE HYDROCHLORIDE 1 MG/ML
0.4 INJECTION, SOLUTION INTRAMUSCULAR; INTRAVENOUS; SUBCUTANEOUS EVERY 5 MIN PRN
Status: DISCONTINUED | OUTPATIENT
Start: 2024-05-21 | End: 2024-05-27 | Stop reason: HOSPADM

## 2024-05-21 RX ORDER — OXYCODONE HYDROCHLORIDE 5 MG/1
5 TABLET ORAL
Status: DISCONTINUED | OUTPATIENT
Start: 2024-05-21 | End: 2024-05-27 | Stop reason: HOSPADM

## 2024-05-21 RX ORDER — FENTANYL CITRATE 50 UG/ML
50 INJECTION, SOLUTION INTRAMUSCULAR; INTRAVENOUS EVERY 5 MIN PRN
Status: DISCONTINUED | OUTPATIENT
Start: 2024-05-21 | End: 2024-05-27 | Stop reason: HOSPADM

## 2024-05-21 RX ORDER — DEXAMETHASONE SODIUM PHOSPHATE 4 MG/ML
INJECTION, SOLUTION INTRA-ARTICULAR; INTRALESIONAL; INTRAMUSCULAR; INTRAVENOUS; SOFT TISSUE PRN
Status: DISCONTINUED | OUTPATIENT
Start: 2024-05-21 | End: 2024-05-21

## 2024-05-21 RX ORDER — KETOROLAC TROMETHAMINE 30 MG/ML
30 INJECTION, SOLUTION INTRAMUSCULAR; INTRAVENOUS ONCE
Status: COMPLETED | OUTPATIENT
Start: 2024-05-21 | End: 2024-05-21

## 2024-05-21 RX ORDER — ONDANSETRON 2 MG/ML
INJECTION INTRAMUSCULAR; INTRAVENOUS PRN
Status: DISCONTINUED | OUTPATIENT
Start: 2024-05-21 | End: 2024-05-21

## 2024-05-21 RX ORDER — NALOXONE HYDROCHLORIDE 0.4 MG/ML
0.1 INJECTION, SOLUTION INTRAMUSCULAR; INTRAVENOUS; SUBCUTANEOUS
Status: DISCONTINUED | OUTPATIENT
Start: 2024-05-21 | End: 2024-05-27 | Stop reason: HOSPADM

## 2024-05-21 RX ORDER — PROPOFOL 10 MG/ML
INJECTION, EMULSION INTRAVENOUS PRN
Status: DISCONTINUED | OUTPATIENT
Start: 2024-05-21 | End: 2024-05-21

## 2024-05-21 RX ORDER — ONDANSETRON 4 MG/1
4 TABLET, ORALLY DISINTEGRATING ORAL EVERY 30 MIN PRN
Status: DISCONTINUED | OUTPATIENT
Start: 2024-05-21 | End: 2024-05-21

## 2024-05-21 RX ORDER — LIDOCAINE HYDROCHLORIDE 20 MG/ML
INJECTION, SOLUTION INFILTRATION; PERINEURAL PRN
Status: DISCONTINUED | OUTPATIENT
Start: 2024-05-21 | End: 2024-05-21

## 2024-05-21 RX ORDER — SODIUM CHLORIDE, SODIUM LACTATE, POTASSIUM CHLORIDE, CALCIUM CHLORIDE 600; 310; 30; 20 MG/100ML; MG/100ML; MG/100ML; MG/100ML
INJECTION, SOLUTION INTRAVENOUS CONTINUOUS
Status: DISCONTINUED | OUTPATIENT
Start: 2024-05-21 | End: 2024-05-27 | Stop reason: HOSPADM

## 2024-05-21 RX ORDER — FENTANYL CITRATE 50 UG/ML
25 INJECTION, SOLUTION INTRAMUSCULAR; INTRAVENOUS EVERY 5 MIN PRN
Status: DISCONTINUED | OUTPATIENT
Start: 2024-05-21 | End: 2024-05-27 | Stop reason: HOSPADM

## 2024-05-21 RX ORDER — FENTANYL CITRATE 50 UG/ML
INJECTION, SOLUTION INTRAMUSCULAR; INTRAVENOUS PRN
Status: DISCONTINUED | OUTPATIENT
Start: 2024-05-21 | End: 2024-05-21

## 2024-05-21 RX ADMIN — DEXAMETHASONE SODIUM PHOSPHATE 6 MG: 4 INJECTION, SOLUTION INTRA-ARTICULAR; INTRALESIONAL; INTRAMUSCULAR; INTRAVENOUS; SOFT TISSUE at 08:10

## 2024-05-21 RX ADMIN — PHENYLEPHRINE HYDROCHLORIDE 200 MCG: 10 INJECTION INTRAVENOUS at 08:03

## 2024-05-21 RX ADMIN — ONDANSETRON 4 MG: 2 INJECTION INTRAMUSCULAR; INTRAVENOUS at 08:49

## 2024-05-21 RX ADMIN — LIDOCAINE HYDROCHLORIDE 100 MG: 20 INJECTION, SOLUTION INFILTRATION; PERINEURAL at 08:02

## 2024-05-21 RX ADMIN — SODIUM CHLORIDE, POTASSIUM CHLORIDE, SODIUM LACTATE AND CALCIUM CHLORIDE: 600; 310; 30; 20 INJECTION, SOLUTION INTRAVENOUS at 08:00

## 2024-05-21 RX ADMIN — PHENYLEPHRINE HYDROCHLORIDE 100 MCG: 10 INJECTION INTRAVENOUS at 08:21

## 2024-05-21 RX ADMIN — KETOROLAC TROMETHAMINE 30 MG: 30 INJECTION INTRAMUSCULAR; INTRAVENOUS at 07:41

## 2024-05-21 RX ADMIN — PHENYLEPHRINE HYDROCHLORIDE 100 MCG: 10 INJECTION INTRAVENOUS at 08:46

## 2024-05-21 RX ADMIN — MIDAZOLAM 2 MG: 1 INJECTION INTRAMUSCULAR; INTRAVENOUS at 08:00

## 2024-05-21 RX ADMIN — FENTANYL CITRATE 100 MCG: 50 INJECTION INTRAMUSCULAR; INTRAVENOUS at 08:02

## 2024-05-21 RX ADMIN — PROPOFOL 200 MG: 10 INJECTION, EMULSION INTRAVENOUS at 08:02

## 2024-05-21 ASSESSMENT — ACTIVITIES OF DAILY LIVING (ADL)
ADLS_ACUITY_SCORE: 29

## 2024-05-21 NOTE — PROGRESS NOTES
Colon and Rectal Surgery Consult Clinic Note    Date: 2024     Referring provider:  Referred Self, MD  No address on file     RE: Michelle Mills  : 1970  HELGA: 2024    Michelle Mills is a very pleasant 53 year old female with cervical cancer currently undergoing radiation with Dr. Henry who presents today for anal pain.    HPI:  Michelle underwent insertion of Kevan Sleeve in the OR with Dr. Henry today and complained of anal pain.  She was noted to have eroded anal papillomas on perianal exam and was sent here for further evaluation and management.  She reports that she has had pain since the radiation started 6 weeks ago.  Bleeding only with bowel movements.  Stools are very soft and she denies any constipation, straining, or diarrhea.  Has never had any anorectal surgeries in the past.  Denies any prolapsing tissue.    Physical Examination:  BP (!) 154/99 (BP Location: Left arm, Patient Position: Sitting, Cuff Size: Adult Regular)   Pulse 72   Temp 97.9  F (36.6  C) (Oral)   Resp 14   Ht 5'   Wt 198 lb 10.2 oz   SpO2 100%   BMI 38.79 kg/m    General: Alert, oriented, in no acute distress, sitting comfortably  HEENT: Mucous membranes moist    Perianal external examination: Exam was chaperoned by Hyacinth Sr MA   Significant excoriation of the perianal skin with fecal material present and skin tag in the anterior position.    Digital rectal examination: Was attempted but not tolerated    Assessment/Plan: 53 year old female significant perianal excoriation.  I think this is likely the source of these pain and likely due to moisture.  Will have her start a daily fiber supplement to bulk up her stools that she did have quite a bit of fecal material present on her perianal skin.  Will have her use a zinc barrier cream and tuck gauze between her buttocks to [place] moisture.  I would like to see her back in 2 weeks to ensure that her symptoms are improving but if she continues to have  significant pain with bowel movements, may need to consider an examination under anesthesia.    Medical history:  Past Medical History:   Diagnosis Date    Anemia     HTN (hypertension)     Hypomagnesemia     Malignant neoplasm of overlapping sites of cervix (H)     Thrombocytopenia (H24)     Tobacco abuse        Surgical history:  No past surgical history on file.    Problem list:    Patient Active Problem List    Diagnosis Date Noted    Encounter for antineoplastic chemotherapy 04/19/2024     Priority: Medium    Malignant neoplasm of exocervix (H) 04/11/2024     Priority: Medium       Medications:  No current outpatient medications on file.       Allergies:  Allergies   Allergen Reactions    Codeine      Other Reaction(s): Unknown       Family history:  Family History   Problem Relation Age of Onset    Anesthesia Reaction Granddaughter         difficulty coming out of anesthesia, PONV, slow to wake    Malignant Hyperthermia No family hx of     Venous thrombosis No family hx of        Social history:  Social History     Tobacco Use    Smoking status: Every Day     Types: Cigarettes     Passive exposure: Current    Smokeless tobacco: Never   Substance Use Topics    Alcohol use: Yes     Comment: Occ    Marital status: single.    Nursing Notes:   Hyacinth Sr  5/21/2024 12:48 PM  Signed  Chief Complaint   Patient presents with    Consult     Anal Pain       Vitals:    05/21/24 1247   BP: (!) 154/99   BP Location: Left arm   Patient Position: Sitting   Cuff Size: Adult Regular   Pulse: 72   Resp: 14   Temp: 97.9  F (36.6  C)   TempSrc: Oral   SpO2: 100%   Weight: 198 lb 10.2 oz   Height: 5'       Body mass index is 38.79 kg/m .    Hyacinth Sr CMA       15 minutes spent on the date of encounter performing chart review, history and exam, documentation and further activities as noted above     ANDREW Townsend, NP-C  Colon and Rectal Surgery   [facility]    This note was  created using speech recognition software and may contain unintended word substitutions.

## 2024-05-21 NOTE — PATIENT INSTRUCTIONS
Start a daily fiber supplement such as Citrucel or Metamucil. Start with once a day and slowly increase up to three times a day, if needed, over the next 4-6 weeks  Calmoseptine cream to perianal skin and keep gauze tucked between buttocks to wick away moisture

## 2024-05-21 NOTE — OP NOTE
PREOPERATIVE DIAGNOSIS: Squamous cell carcinoma of the cervix, FIGO IB3     POSTOPERATIVE DIAGNOSIS: Squamous cell carcinoma of the cervix, FIGO IB3     NAME OF THE OPERATION:   1. Examination under anesthesia.   2. Insertion of Kevan Sleeve into cervix 6 cm     SURGEONS: Dede Henry, Radiation Oncology      Assistant: Linda Espino MD PGY4     ANESTHESIA: General.      ESTIMATED BLOOD LOSS: 15 cc     COMPLICATIONS: None.      OPERATIVE FINDINGS: Normal external genitalia.  Eroded anal papillomas and perianal fissure. Post-radiation changes noted in cervix with friable tumor at the cervical os. No mucosal lesions of the vagina.     OPERATIVE PROCEDURE: The patient was brought to the operating room wearing pneumo-boots and identified to be herself. The patient was placed under general anesthesia. She was then placed in dorsal lithotomy position. A timeout was called and the patient's name, operative procedure and site was confirmed by the operative team. On exam under anesthesia, the above findings were noted. The patient was prepped and draped in the usual  sterile fashion. A speculum was used to visualize the cervix. Anterior cervical lip was grasped with tenaculum. The cervical os was identified and the depth of the uterine cavity was assessed using a malleable sound. The uterine canal was sounded to 9 cm. The cervix was then sequentially dilated with Hegar dilators until the Kevan sleeve could be easily placed in the cervical os. Ultrasound guidance was used for assistance. Four sutures were placed into the cervix. A 100 mm Kevan Sleeve was cut to 60 mm. The modified Kevan Sleeve was inserted into the cervix and secured with the sutures. Anesthesia was then reversed and the patient was taken to the PACU in stable condition.      Dr. Henry was present for the entire procedure.     Linda VILLALPANDO I was present with Dr. Espino during all critical portions of the operation, and immediately available  for final wake-up.  I have edited Dr. Espino's note to describe the operative findings and flow.      Dede Henry MD  Radiation Oncology

## 2024-05-21 NOTE — ANESTHESIA PROCEDURE NOTES
Airway       Patient location during procedure: OR  Staff -        CRNA: Benito Valladares APRN CRNA       Performed By: CRNA  Consent for Airway        Urgency: elective  Indications and Patient Condition       Indications for airway management: alina-procedural       Induction type:intravenous       Mask difficulty assessment: 0 - not attempted    Final Airway Details       Final airway type: supraglottic airway    Supraglottic Airway Details        Type: LMA       Brand: I-Gel       LMA size: 3    Post intubation assessment        Placement verified by: capnometry, equal breath sounds and chest rise        Number of attempts at approach: 1       Secured with: tape       Ease of procedure: easy       Dentition: Intact and Unchanged

## 2024-05-21 NOTE — NURSING NOTE
Chief Complaint   Patient presents with    Consult     Anal Pain       Vitals:    05/21/24 1247   BP: (!) 154/99   BP Location: Left arm   Patient Position: Sitting   Cuff Size: Adult Regular   Pulse: 72   Resp: 14   Temp: 97.9  F (36.6  C)   TempSrc: Oral   SpO2: 100%   Weight: 198 lb 10.2 oz   Height: 5'       Body mass index is 38.79 kg/m .    Hyacinth Sr, CMA

## 2024-05-21 NOTE — ANESTHESIA PREPROCEDURE EVALUATION
"Anesthesia Pre-Procedure Evaluation    Patient: Michelle Mills   MRN: [id number] : 1970        Procedure : Procedure(s):  EXAM UNDER ANESTHESIA, GYNECOLOGIC, WITH INSERTION OF KENDY SLEEVE, WITH ULTRASOUND GUIDANCE  possible INSERTION, FIDUCIAL MARKER SEED, CERVIX, FOR RADIATION THERAPY          Past Medical History:   Diagnosis Date     Anemia      HTN (hypertension)      Hypomagnesemia      Malignant neoplasm of overlapping sites of cervix (H)      Thrombocytopenia (H24)      Tobacco abuse       History reviewed. No pertinent surgical history.   Allergies   Allergen Reactions     Codeine      Other Reaction(s): Unknown      Social History     Tobacco Use     Smoking status: Every Day     Types: Cigarettes     Passive exposure: Current     Smokeless tobacco: Never   Substance Use Topics     Alcohol use: Yes     Comment: Occ      Wt Readings from Last 1 Encounters:   24 90.1 kg (198 lb 10.2 oz)           Physical Exam    Airway             Respiratory Devices and Support         Dental       (+) Completely normal teeth      Cardiovascular             Pulmonary               OUTSIDE LABS:  CBC:   Lab Results   Component Value Date    WBC 3.4 (L) 2024    WBC 5.9 2024    HGB 10.1 (L) 2024    HGB 10.7 (L) 2024    HCT 30.6 (L) 2024    HCT 31.9 (L) 2024     (L) 2024     (L) 2024     BMP:   Lab Results   Component Value Date     2024     2024    POTASSIUM 4.0 2024    POTASSIUM 3.6 2024    CHLORIDE 100 2024    CHLORIDE 101 2024    CO2 26 2024    CO2 26 2024    BUN 9.9 2024    BUN 11.9 2024    CR 0.93 2024    CR 0.77 2024     (H) 2024     (H) 2024     COAGS: No results found for: \"PTT\", \"INR\", \"FIBR\"  POC: No results found for: \"BGM\", \"HCG\", \"HCGS\"  HEPATIC:   Lab Results   Component Value Date    ALBUMIN 3.8 2024    PROTTOTAL 6.9 " 05/20/2024    ALT 18 05/20/2024    AST 18 05/20/2024    ALKPHOS 83 05/20/2024    BILITOTAL 0.2 05/20/2024     OTHER:   Lab Results   Component Value Date    IVON 9.0 05/20/2024    MAG 1.2 (L) 05/20/2024       Anesthesia Plan    ASA Status:  2       Anesthesia Type: MAC.              Consents            Postoperative Care            Comments:               Zia Vargas MD    I have reviewed the pertinent notes and labs in the chart from the past 30 days and (re)examined the patient.  Any updates or changes from those notes are reflected in this note.    # Hypokalemia: Lowest K = 3.2 mmol/L in last 30 days, will replace as needed     # Hypomagnesemia: Lowest Mg = 1.2 mg/dL in last 2 days, will replace as needed      # Thrombocytopenia: Lowest platelets = 116 in last 2 days, will monitor for bleeding  # Obesity: Estimated body mass index is 38.79 kg/m  as calculated from the following:    Height as of this encounter: 1.524 m (5').    Weight as of this encounter: 90.1 kg (198 lb 10.2 oz).

## 2024-05-21 NOTE — ANESTHESIA CARE TRANSFER NOTE
Patient: Michelle Mills    Procedure: Procedure(s):  EXAM UNDER ANESTHESIA, GYNECOLOGIC, WITH INSERTION OF KENDY SLEEVE, WITH ULTRASOUND GUIDANCE       Diagnosis: Malignant neoplasm of overlapping sites of cervix (H) [C53.8]  Diagnosis Additional Information: No value filed.    Anesthesia Type:   MAC     Note:    Oropharynx: oropharynx clear of all foreign objects and spontaneously breathing  Level of Consciousness: awake  Oxygen Supplementation: nasal cannula  Level of Supplemental Oxygen (L/min / FiO2): 2  Independent Airway: airway patency satisfactory and stable  Dentition: dentition unchanged  Vital Signs Stable: post-procedure vital signs reviewed and stable  Report to RN Given: handoff report given  Patient transferred to: PACU    Handoff Report: Identifed the Patient, Identified the Reponsible Provider, Reviewed the pertinent medical history, Discussed the surgical course, Reviewed Intra-OP anesthesia mangement and issues during anesthesia, Set expectations for post-procedure period and Allowed opportunity for questions and acknowledgement of understanding      Vitals:  Vitals Value Taken Time   /103 05/21/24 0921   Temp     Pulse 80 05/21/24 0922   Resp 19 05/21/24 0922   SpO2 100 % 05/21/24 0922   Vitals shown include unfiled device data.    Electronically Signed By: ANDREW Rayo CRNA  May 21, 2024  9:22 AM

## 2024-05-21 NOTE — DISCHARGE INSTRUCTIONS
Contacting your Doctor -   To contact a doctor, call Dr Henry at  [phone] (radiation oncology clinic)  or:  [phone] and ask for the resident on call for Gynecology (answered 24 hours a day)   Emergency Department:  [facility]: [phone]  [facility]: [phone] 911 if you are in need of immediate or emergent help

## 2024-05-21 NOTE — ANESTHESIA POSTPROCEDURE EVALUATION
Patient: Michelle Mills    Procedure: Procedure(s):  EXAM UNDER ANESTHESIA, GYNECOLOGIC, WITH INSERTION OF EKNDY SLEEVE, WITH ULTRASOUND GUIDANCE       Anesthesia Type:  MAC    Note:  Disposition: Inpatient   Postop Pain Control: Uneventful            Sign Out: Well controlled pain   PONV: No   Neuro/Psych: Uneventful            Sign Out: Acceptable/Baseline neuro status   Airway/Respiratory: Uneventful            Sign Out: Acceptable/Baseline resp. status   CV/Hemodynamics: Uneventful            Sign Out: Acceptable CV status; No obvious hypovolemia; No obvious fluid overload   Other NRE: NONE   DID A NON-ROUTINE EVENT OCCUR? No           Last vitals:  Vitals Value Taken Time   /103 05/21/24 0921   Temp     Pulse 79 05/21/24 0929   Resp 18 05/21/24 0929   SpO2 100 % 05/21/24 0929   Vitals shown include unfiled device data.    Electronically Signed By: Lacy Jenkins MD  May 21, 2024  9:29 AM

## 2024-05-21 NOTE — LETTER
2024       RE: Michelle Mills  [address]  [facility] [address]       Dear Colleague,    Thank you for referring your patient, Michelle Mills, to the [facility] COLON AND RECTAL SURGERY CLINIC [place] at [facility]. Please see a copy of my visit note below.    Colon and Rectal Surgery Consult Clinic Note    Date: 2024     Referring provider:  Referred Self, MD  No address on file     RE: Mihcelle Mills  : 1970  HELGA: 2024    Michelle Mills is a very pleasant 53 year old female with cervical cancer currently undergoing radiation with Dr. Henry who presents today for anal pain.    HPI:  Michelle underwent insertion of Kevan Sleeve in the OR with Dr. Henry today and complained of anal pain.  She was noted to have eroded anal papillomas on perianal exam and was sent here for further evaluation and management.  She reports that she has had pain since the radiation started 6 weeks ago.  Bleeding only with bowel movements.  Stools are very soft and she denies any constipation, straining, or diarrhea.  Has never had any anorectal surgeries in the past.  Denies any prolapsing tissue.    Physical Examination:  BP (!) 154/99 (BP Location: Left arm, Patient Position: Sitting, Cuff Size: Adult Regular)   Pulse 72   Temp 97.9  F (36.6  C) (Oral)   Resp 14   Ht 5'   Wt 198 lb 10.2 oz   SpO2 100%   BMI 38.79 kg/m    General: Alert, oriented, in no acute distress, sitting comfortably  HEENT: Mucous membranes moist    Perianal external examination: Exam was chaperoned by Hyacinth Sr MA   Significant excoriation of the perianal skin with fecal material present and skin tag in the anterior position.    Digital rectal examination: Was attempted but not tolerated    Assessment/Plan: 53 year old female significant perianal excoriation.  I think this is likely the source of these pain and likely due to moisture.  Will have her start a daily  fiber supplement to bulk up her stools that she did have quite a bit of fecal material present on her perianal skin.  Will have her use a zinc barrier cream and tuck gauze between her buttocks to [place] moisture.  I would like to see her back in 2 weeks to ensure that her symptoms are improving but if she continues to have significant pain with bowel movements, may need to consider an examination under anesthesia.    Medical history:  Past Medical History:   Diagnosis Date    Anemia     HTN (hypertension)     Hypomagnesemia     Malignant neoplasm of overlapping sites of cervix (H)     Thrombocytopenia (H24)     Tobacco abuse        Surgical history:  No past surgical history on file.    Problem list:    Patient Active Problem List    Diagnosis Date Noted    Encounter for antineoplastic chemotherapy 04/19/2024     Priority: Medium    Malignant neoplasm of exocervix (H) 04/11/2024     Priority: Medium       Medications:  No current outpatient medications on file.       Allergies:  Allergies   Allergen Reactions    Codeine      Other Reaction(s): Unknown       Family history:  Family History   Problem Relation Age of Onset    Anesthesia Reaction Granddaughter         difficulty coming out of anesthesia, PONV, slow to wake    Malignant Hyperthermia No family hx of     Venous thrombosis No family hx of        Social history:  Social History     Tobacco Use    Smoking status: Every Day     Types: Cigarettes     Passive exposure: Current    Smokeless tobacco: Never   Substance Use Topics    Alcohol use: Yes     Comment: Occ    Marital status: single.    Nursing Notes:   Hyacinth Sr  5/21/2024 12:48 PM  Signed  Chief Complaint   Patient presents with    Consult     Anal Pain       Vitals:    05/21/24 1247   BP: (!) 154/99   BP Location: Left arm   Patient Position: Sitting   Cuff Size: Adult Regular   Pulse: 72   Resp: 14   Temp: 97.9  F (36.6  C)   TempSrc: Oral   SpO2: 100%   Weight: 198 lb 10.2 oz   Height: 5'        Body mass index is 38.79 kg/m .    Hyacinth Sr CMA       15 minutes spent on the date of encounter performing chart review, history and exam, documentation and further activities as noted above       This note was created using speech recognition software and may contain unintended word substitutions.      Again, thank you for allowing me to participate in the care of your patient.      Sincerely,    ANDREW Rice CNP

## 2024-05-22 ENCOUNTER — APPOINTMENT (OUTPATIENT)
Dept: RADIATION ONCOLOGY | Facility: CLINIC | Age: 54
End: 2024-05-22
Attending: RADIOLOGY
Payer: COMMERCIAL

## 2024-05-22 PROCEDURE — 77014 PR CT GUIDE FOR PLACEMENT RADIATION THERAPY FIELDS: CPT | Mod: 26 | Performed by: RADIOLOGY

## 2024-05-22 PROCEDURE — 77386 HC IMRT TREATMENT DELIVERY, COMPLEX: CPT | Performed by: RADIOLOGY

## 2024-05-22 ASSESSMENT — ACTIVITIES OF DAILY LIVING (ADL)
ADLS_ACUITY_SCORE: 29

## 2024-05-23 ENCOUNTER — APPOINTMENT (OUTPATIENT)
Dept: RADIATION ONCOLOGY | Facility: CLINIC | Age: 54
End: 2024-05-23
Attending: RADIOLOGY
Payer: COMMERCIAL

## 2024-05-23 PROCEDURE — 77386 HC IMRT TREATMENT DELIVERY, COMPLEX: CPT | Performed by: RADIOLOGY

## 2024-05-23 PROCEDURE — 77014 PR CT GUIDE FOR PLACEMENT RADIATION THERAPY FIELDS: CPT | Mod: 26 | Performed by: RADIOLOGY

## 2024-05-23 ASSESSMENT — ACTIVITIES OF DAILY LIVING (ADL)
ADLS_ACUITY_SCORE: 29

## 2024-05-24 ENCOUNTER — APPOINTMENT (OUTPATIENT)
Dept: RADIATION ONCOLOGY | Facility: CLINIC | Age: 54
End: 2024-05-24
Attending: RADIOLOGY
Payer: COMMERCIAL

## 2024-05-24 PROCEDURE — 77386 HC IMRT TREATMENT DELIVERY, COMPLEX: CPT | Performed by: RADIOLOGY

## 2024-05-24 PROCEDURE — 77014 PR CT GUIDE FOR PLACEMENT RADIATION THERAPY FIELDS: CPT | Mod: 26 | Performed by: RADIOLOGY

## 2024-05-24 ASSESSMENT — ACTIVITIES OF DAILY LIVING (ADL)
ADLS_ACUITY_SCORE: 29

## 2024-05-25 ASSESSMENT — ACTIVITIES OF DAILY LIVING (ADL)
ADLS_ACUITY_SCORE: 29

## 2024-05-26 ASSESSMENT — ACTIVITIES OF DAILY LIVING (ADL)
ADLS_ACUITY_SCORE: 29

## 2024-05-28 ENCOUNTER — APPOINTMENT (OUTPATIENT)
Dept: CT IMAGING | Facility: CLINIC | Age: 54
End: 2024-05-28
Attending: EMERGENCY MEDICINE
Payer: COMMERCIAL

## 2024-05-28 ENCOUNTER — OFFICE VISIT (OUTPATIENT)
Dept: RADIATION ONCOLOGY | Facility: CLINIC | Age: 54
End: 2024-05-28
Attending: RADIOLOGY
Payer: COMMERCIAL

## 2024-05-28 ENCOUNTER — HOSPITAL ENCOUNTER (EMERGENCY)
Facility: CLINIC | Age: 54
Discharge: HOME | End: 2024-05-28
Attending: EMERGENCY MEDICINE | Admitting: EMERGENCY MEDICINE
Payer: COMMERCIAL

## 2024-05-28 ENCOUNTER — ANESTHESIA EVENT (OUTPATIENT)
Dept: GASTROENTEROLOGY | Facility: CLINIC | Age: 54
End: 2024-05-28
Payer: COMMERCIAL

## 2024-05-28 ENCOUNTER — APPOINTMENT (OUTPATIENT)
Dept: GENERAL RADIOLOGY | Facility: CLINIC | Age: 54
End: 2024-05-28
Attending: EMERGENCY MEDICINE
Payer: COMMERCIAL

## 2024-05-28 VITALS
SYSTOLIC BLOOD PRESSURE: 153 MMHG | RESPIRATION RATE: 18 BRPM | TEMPERATURE: 98.1 F | OXYGEN SATURATION: 97 % | DIASTOLIC BLOOD PRESSURE: 80 MMHG | HEART RATE: 89 BPM

## 2024-05-28 VITALS
HEART RATE: 82 BPM | BODY MASS INDEX: 38.67 KG/M2 | WEIGHT: 198 LBS | SYSTOLIC BLOOD PRESSURE: 150 MMHG | DIASTOLIC BLOOD PRESSURE: 74 MMHG

## 2024-05-28 DIAGNOSIS — C53.1 MALIGNANT NEOPLASM OF EXOCERVIX (H): Primary | ICD-10-CM

## 2024-05-28 DIAGNOSIS — R10.30 LOWER ABDOMINAL PAIN: ICD-10-CM

## 2024-05-28 DIAGNOSIS — M79.10 MYALGIA: ICD-10-CM

## 2024-05-28 DIAGNOSIS — K62.89 RECTAL PAIN: ICD-10-CM

## 2024-05-28 DIAGNOSIS — K57.92 DIVERTICULITIS: ICD-10-CM

## 2024-05-28 LAB
ALBUMIN SERPL BCG-MCNC: 3.8 G/DL (ref 3.5–5.2)
ALBUMIN UR-MCNC: NEGATIVE MG/DL
ALP SERPL-CCNC: 105 U/L (ref 40–150)
ALT SERPL W P-5'-P-CCNC: 17 U/L (ref 0–50)
ANION GAP SERPL CALCULATED.3IONS-SCNC: 14 MMOL/L (ref 7–15)
APPEARANCE UR: CLEAR
AST SERPL W P-5'-P-CCNC: 27 U/L (ref 0–45)
BASOPHILS # BLD AUTO: 0 10E3/UL (ref 0–0.2)
BASOPHILS NFR BLD AUTO: 0 %
BILIRUB SERPL-MCNC: 0.5 MG/DL
BILIRUB UR QL STRIP: NEGATIVE
BUN SERPL-MCNC: 15.8 MG/DL (ref 6–20)
CALCIUM SERPL-MCNC: 9.1 MG/DL (ref 8.6–10)
CHLORIDE SERPL-SCNC: 92 MMOL/L (ref 98–107)
COLOR UR AUTO: ABNORMAL
CREAT SERPL-MCNC: 1.03 MG/DL (ref 0.51–0.95)
DEPRECATED HCO3 PLAS-SCNC: 24 MMOL/L (ref 22–29)
EGFRCR SERPLBLD CKD-EPI 2021: 65 ML/MIN/1.73M2
EOSINOPHIL # BLD AUTO: 0 10E3/UL (ref 0–0.7)
EOSINOPHIL NFR BLD AUTO: 1 %
ERYTHROCYTE [DISTWIDTH] IN BLOOD BY AUTOMATED COUNT: 19.4 % (ref 10–15)
GLUCOSE SERPL-MCNC: 105 MG/DL (ref 70–99)
GLUCOSE UR STRIP-MCNC: NEGATIVE MG/DL
HCT VFR BLD AUTO: 28.8 % (ref 35–47)
HGB BLD-MCNC: 9.7 G/DL (ref 11.7–15.7)
HGB UR QL STRIP: NEGATIVE
IMM GRANULOCYTES # BLD: 0 10E3/UL
IMM GRANULOCYTES NFR BLD: 0 %
KETONES UR STRIP-MCNC: NEGATIVE MG/DL
LEUKOCYTE ESTERASE UR QL STRIP: ABNORMAL
LIPASE SERPL-CCNC: 13 U/L (ref 13–60)
LYMPHOCYTES # BLD AUTO: 0.2 10E3/UL (ref 0.8–5.3)
LYMPHOCYTES NFR BLD AUTO: 9 %
MCH RBC QN AUTO: 31.5 PG (ref 26.5–33)
MCHC RBC AUTO-ENTMCNC: 33.7 G/DL (ref 31.5–36.5)
MCV RBC AUTO: 94 FL (ref 78–100)
MONOCYTES # BLD AUTO: 0.2 10E3/UL (ref 0–1.3)
MONOCYTES NFR BLD AUTO: 10 %
NEUTROPHILS # BLD AUTO: 1.9 10E3/UL (ref 1.6–8.3)
NEUTROPHILS NFR BLD AUTO: 80 %
NITRATE UR QL: NEGATIVE
NRBC # BLD AUTO: 0 10E3/UL
NRBC BLD AUTO-RTO: 0 /100
PH UR STRIP: 5.5 [PH] (ref 5–7)
PLATELET # BLD AUTO: 131 10E3/UL (ref 150–450)
POTASSIUM SERPL-SCNC: 3.9 MMOL/L (ref 3.4–5.3)
PROT SERPL-MCNC: 7.2 G/DL (ref 6.4–8.3)
RBC # BLD AUTO: 3.08 10E6/UL (ref 3.8–5.2)
RBC URINE: 1 /HPF
SODIUM SERPL-SCNC: 130 MMOL/L (ref 135–145)
SP GR UR STRIP: 1.01 (ref 1–1.03)
SQUAMOUS EPITHELIAL: 1 /HPF
TRANSITIONAL EPI: <1 /HPF
UROBILINOGEN UR STRIP-MCNC: NORMAL MG/DL
WBC # BLD AUTO: 2.4 10E3/UL (ref 4–11)
WBC URINE: 4 /HPF

## 2024-05-28 PROCEDURE — 83690 ASSAY OF LIPASE: CPT | Performed by: EMERGENCY MEDICINE

## 2024-05-28 PROCEDURE — 250N000011 HC RX IP 250 OP 636: Performed by: EMERGENCY MEDICINE

## 2024-05-28 PROCEDURE — 77386 HC IMRT TREATMENT DELIVERY, COMPLEX: CPT | Performed by: RADIOLOGY

## 2024-05-28 PROCEDURE — 36415 COLL VENOUS BLD VENIPUNCTURE: CPT | Performed by: EMERGENCY MEDICINE

## 2024-05-28 PROCEDURE — 85025 COMPLETE CBC W/AUTO DIFF WBC: CPT | Performed by: EMERGENCY MEDICINE

## 2024-05-28 PROCEDURE — 71046 X-RAY EXAM CHEST 2 VIEWS: CPT

## 2024-05-28 PROCEDURE — 96366 THER/PROPH/DIAG IV INF ADDON: CPT | Performed by: EMERGENCY MEDICINE

## 2024-05-28 PROCEDURE — 99285 EMERGENCY DEPT VISIT HI MDM: CPT | Mod: 25 | Performed by: EMERGENCY MEDICINE

## 2024-05-28 PROCEDURE — 96361 HYDRATE IV INFUSION ADD-ON: CPT | Performed by: EMERGENCY MEDICINE

## 2024-05-28 PROCEDURE — 80053 COMPREHEN METABOLIC PANEL: CPT | Performed by: EMERGENCY MEDICINE

## 2024-05-28 PROCEDURE — 74177 CT ABD & PELVIS W/CONTRAST: CPT

## 2024-05-28 PROCEDURE — 99285 EMERGENCY DEPT VISIT HI MDM: CPT | Performed by: EMERGENCY MEDICINE

## 2024-05-28 PROCEDURE — 96375 TX/PRO/DX INJ NEW DRUG ADDON: CPT | Performed by: EMERGENCY MEDICINE

## 2024-05-28 PROCEDURE — 81001 URINALYSIS AUTO W/SCOPE: CPT | Performed by: EMERGENCY MEDICINE

## 2024-05-28 PROCEDURE — 74177 CT ABD & PELVIS W/CONTRAST: CPT | Mod: 26 | Performed by: RADIOLOGY

## 2024-05-28 PROCEDURE — 71046 X-RAY EXAM CHEST 2 VIEWS: CPT | Mod: 26 | Performed by: RADIOLOGY

## 2024-05-28 PROCEDURE — 258N000003 HC RX IP 258 OP 636: Performed by: EMERGENCY MEDICINE

## 2024-05-28 PROCEDURE — 77336 RADIATION PHYSICS CONSULT: CPT | Performed by: RADIOLOGY

## 2024-05-28 PROCEDURE — 77427 RADIATION TX MANAGEMENT X5: CPT | Performed by: RADIOLOGY

## 2024-05-28 PROCEDURE — 250N000011 HC RX IP 250 OP 636: Mod: JZ | Performed by: EMERGENCY MEDICINE

## 2024-05-28 PROCEDURE — 96365 THER/PROPH/DIAG IV INF INIT: CPT | Mod: 59 | Performed by: EMERGENCY MEDICINE

## 2024-05-28 PROCEDURE — 87040 BLOOD CULTURE FOR BACTERIA: CPT | Performed by: EMERGENCY MEDICINE

## 2024-05-28 RX ORDER — HYDROCODONE BITARTRATE AND ACETAMINOPHEN 5; 325 MG/1; MG/1
1 TABLET ORAL EVERY 6 HOURS PRN
Qty: 12 TABLET | Refills: 0 | Status: SHIPPED | OUTPATIENT
Start: 2024-05-28 | End: 2024-05-31

## 2024-05-28 RX ORDER — KETOROLAC TROMETHAMINE 15 MG/ML
10 INJECTION, SOLUTION INTRAMUSCULAR; INTRAVENOUS ONCE
Status: COMPLETED | OUTPATIENT
Start: 2024-05-28 | End: 2024-05-28

## 2024-05-28 RX ORDER — CIPROFLOXACIN 2 MG/ML
400 INJECTION, SOLUTION INTRAVENOUS EVERY 12 HOURS
Status: DISCONTINUED | OUTPATIENT
Start: 2024-05-28 | End: 2024-05-28 | Stop reason: HOSPADM

## 2024-05-28 RX ORDER — CIPROFLOXACIN 500 MG/1
500 TABLET, FILM COATED ORAL 2 TIMES DAILY
Qty: 20 TABLET | Refills: 0 | Status: SHIPPED | OUTPATIENT
Start: 2024-05-28 | End: 2024-06-07

## 2024-05-28 RX ORDER — METRONIDAZOLE 500 MG/1
500 TABLET ORAL 3 TIMES DAILY
Qty: 21 TABLET | Refills: 0 | Status: SHIPPED | OUTPATIENT
Start: 2024-05-28 | End: 2024-06-04

## 2024-05-28 RX ORDER — METRONIDAZOLE 500 MG/100ML
500 INJECTION, SOLUTION INTRAVENOUS EVERY 8 HOURS
Status: DISCONTINUED | OUTPATIENT
Start: 2024-05-28 | End: 2024-05-28 | Stop reason: HOSPADM

## 2024-05-28 RX ORDER — IOPAMIDOL 755 MG/ML
122 INJECTION, SOLUTION INTRAVASCULAR ONCE
Status: COMPLETED | OUTPATIENT
Start: 2024-05-28 | End: 2024-05-28

## 2024-05-28 RX ORDER — HYDROCORTISONE ACETATE 25 MG/1
25 SUPPOSITORY RECTAL 2 TIMES DAILY
Qty: 30 SUPPOSITORY | Refills: 4 | Status: SHIPPED | OUTPATIENT
Start: 2024-05-28

## 2024-05-28 RX ADMIN — METRONIDAZOLE 500 MG: 500 INJECTION, SOLUTION INTRAVENOUS at 15:39

## 2024-05-28 RX ADMIN — CIPROFLOXACIN 400 MG: 2 INJECTION, SOLUTION INTRAVENOUS at 14:39

## 2024-05-28 RX ADMIN — SODIUM CHLORIDE 1000 ML: 9 INJECTION, SOLUTION INTRAVENOUS at 10:20

## 2024-05-28 RX ADMIN — IOPAMIDOL 122 ML: 755 INJECTION, SOLUTION INTRAVENOUS at 12:53

## 2024-05-28 RX ADMIN — KETOROLAC TROMETHAMINE 10 MG: 15 INJECTION INTRAMUSCULAR; INTRAVENOUS at 10:22

## 2024-05-28 ASSESSMENT — ACTIVITIES OF DAILY LIVING (ADL)
ADLS_ACUITY_SCORE: 33
ADLS_ACUITY_SCORE: 35

## 2024-05-28 ASSESSMENT — COLUMBIA-SUICIDE SEVERITY RATING SCALE - C-SSRS
2. HAVE YOU ACTUALLY HAD ANY THOUGHTS OF KILLING YOURSELF IN THE PAST MONTH?: NO
6. HAVE YOU EVER DONE ANYTHING, STARTED TO DO ANYTHING, OR PREPARED TO DO ANYTHING TO END YOUR LIFE?: NO
1. IN THE PAST MONTH, HAVE YOU WISHED YOU WERE DEAD OR WISHED YOU COULD GO TO SLEEP AND NOT WAKE UP?: NO

## 2024-05-28 NOTE — ED PROVIDER NOTES
History     Chief Complaint   Patient presents with    Generalized Body Aches     HPI  Michelle Mills is a 53 year old female with a past medical history of anemia, hypertension, hypomagnesemia, cervical cancer, thrombocytopenia who presents to the emergency department with a chief complaint of generalized bodyaches.  Also with lower abdominal pain. She is concerned about infection. She denies urinary symptoms. She does have chronic diarrhea issues, but her stool has been different in the last few days, with more mucus. Symptoms have been present for several days.  Associated with headache, fatigue, and generalized weakness.  Patient took ibuprofen in the middle of the night without any improvement.    I have reviewed the Medications, Allergies, Past Medical and Surgical History, and Social History in the [employer] system.    Past Medical History:   Diagnosis Date    Anemia     HTN (hypertension)     Hypomagnesemia     Malignant neoplasm of overlapping sites of cervix (H)     Thrombocytopenia (H24)     Tobacco abuse      Past Surgical History:   Procedure Laterality Date    EXAM UNDER ANESTHESIA, INSERT KENDY SLEEVE, UTERINE PLACEMENT OF TANDEM AND RING FOR RAD, ULTRASOUND N/A 5/21/2024    Procedure: EXAM UNDER ANESTHESIA, GYNECOLOGIC, WITH INSERTION OF KENDY SLEEVE, WITH ULTRASOUND GUIDANCE;  Surgeon: Dede Henry MD;  Location:  OR     No current facility-administered medications for this encounter.     Current Outpatient Medications   Medication Sig Dispense Refill    acetaminophen (TYLENOL) 500 MG tablet Take 500-1,000 mg by mouth every 6 hours as needed for mild pain      chlorthalidone (HYGROTON) 25 MG tablet Take 1 tablet (25 mg) by mouth daily (Patient taking differently: Take 25 mg by mouth every morning) 60 tablet 0    dexAMETHasone (DECADRON) 4 MG tablet Take 2 tablets (8 mg) by mouth daily Take for 3 days, starting the day after chemo on day 2 and 9. Take with food. If no nausea and vomiting with  first cisplatin doses, may stop dexamethasone. 12 tablet 2    hydrocortisone-pramoxine (ANALPRAM-HC) rectal cream Place rectally 2 times daily (Patient taking differently: Place rectally as needed) 30 g 1    ibuprofen (ADVIL/MOTRIN) 200 MG capsule Take 1-2 capsules (200-400 mg) by mouth every 6 hours as needed for moderate pain, fever or inflammatory pain 30 capsule 0    multivitamin, therapeutic (THERA-VIT) TABS tablet Take 1 tablet by mouth daily      ondansetron (ZOFRAN) 8 MG tablet Take 1 tablet (8 mg) by mouth every 8 hours as needed for nausea (vomiting) 30 tablet 2    oxyCODONE (ROXICODONE) 5 MG tablet Take 1 tablet (5 mg) by mouth every 8 hours as needed for severe pain 10 tablet 0    prochlorperazine (COMPAZINE) 10 MG tablet Take 1 tablet (10 mg) by mouth every 6 hours as needed for nausea or vomiting 30 tablet 2    senna-docusate (SENOKOT-S/PERICOLACE) 8.6-50 MG tablet Take 1-2 tablets by mouth 2 times daily as needed for constipation 30 tablet 0    vitamin D2 (ERGOCALCIFEROL) 40529 units (1250 mcg) capsule       witch hazel-glycerin (TUCKS) pad Apply topically as needed for hemorrhoids 50 each 1     Allergies   Allergen Reactions    Codeine      Other Reaction(s): Unknown     Past medical history, past surgical history, medications, and allergies were reviewed with the patient. Additional pertinent items: None    Social History     Socioeconomic History    Marital status: Single     Spouse name: Not on file    Number of children: Not on file    Years of education: Not on file    Highest education level: Not on file   Occupational History    Not on file   Tobacco Use    Smoking status: Every Day     Types: Cigarettes     Passive exposure: Current    Smokeless tobacco: Never   Substance and Sexual Activity    Alcohol use: Yes     Comment: Occ    Drug use: Not Currently    Sexual activity: Not on file   Other Topics Concern    Not on file   Social History Narrative    Not on file     Social Determinants of  Health     Financial Resource Strain: Not on file   Food Insecurity: Not on file   Transportation Needs: Not on file   Physical Activity: Not on file   Stress: Not on file   Social Connections: Not on file   Interpersonal Safety: Not on file   Housing Stability: Not on file     Social history was reviewed with the patient. Additional pertinent items: None    Review of Systems  A medically appropriate review of systems was performed with pertinent positives and negatives noted in the HPI, and all other systems negative.    Physical Exam   BP: (!) 153/80  Pulse: 89  Temp: 98.1  F (36.7  C)  Resp: 18  SpO2: 97 %      General: Well nourished, well developed, NAD  HEENT: EOMI, anicteric. NCAT, MMM  Neck: no jugular venous distension, supple, nl ROM  Cardiac: Regular rate and rhythm. No murmurs, rubs, or gallops. Normal S1, S2.  Intact peripheral pulses  Pulm: CTAB, no stridor, wheezes, rales, rhonchi  Abd: Soft, LLQ TTP w/o rebound or guarding, nondistended.  No masses palpated.    Skin: Warm and dry to the touch.  No rash  Extremities: No LE edema, no cyanosis, w/w/p  Neuro: A&Ox3, no gross focal deficits    ED Course        Procedures                        Labs Ordered and Resulted from Time of ED Arrival to Time of ED Departure   COMPREHENSIVE METABOLIC PANEL - Abnormal       Result Value    Sodium 130 (*)     Potassium 3.9      Carbon Dioxide (CO2) 24      Anion Gap 14      Urea Nitrogen 15.8      Creatinine 1.03 (*)     GFR Estimate 65      Calcium 9.1      Chloride 92 (*)     Glucose 105 (*)     Alkaline Phosphatase 105      AST 27      ALT 17      Protein Total 7.2      Albumin 3.8      Bilirubin Total 0.5     ROUTINE UA WITH MICROSCOPIC REFLEX TO CULTURE - Abnormal    Color Urine Light Yellow      Appearance Urine Clear      Glucose Urine Negative      Bilirubin Urine Negative      Ketones Urine Negative      Specific Gravity Urine 1.007      Blood Urine Negative      pH Urine 5.5      Protein Albumin Urine  Negative      Urobilinogen Urine Normal      Nitrite Urine Negative      Leukocyte Esterase Urine Trace (*)     RBC Urine 1      WBC Urine 4      Squamous Epithelials Urine 1      Transitional Epithelials Urine <1     CBC WITH PLATELETS AND DIFFERENTIAL - Abnormal    WBC Count 2.4 (*)     RBC Count 3.08 (*)     Hemoglobin 9.7 (*)     Hematocrit 28.8 (*)     MCV 94      MCH 31.5      MCHC 33.7      RDW 19.4 (*)     Platelet Count 131 (*)     % Neutrophils 80      % Lymphocytes 9      % Monocytes 10      % Eosinophils 1      % Basophils 0      % Immature Granulocytes 0      NRBCs per 100 WBC 0      Absolute Neutrophils 1.9      Absolute Lymphocytes 0.2 (*)     Absolute Monocytes 0.2      Absolute Eosinophils 0.0      Absolute Basophils 0.0      Absolute Immature Granulocytes 0.0      Absolute NRBCs 0.0     LIPASE - Normal    Lipase 13       EXAMINATION: CT ABDOMEN PELVIS W CONTRAST, 5/28/2024 12:54 PM     TECHNIQUE:  Helical CT images from the lung bases through the  symphysis pubis were obtained with IV contrast. Contrast dose: 122cc  of isovue 370     COMPARISON: PET CT 3/22/2024     HISTORY: lower abdominal pain     FINDINGS:     Liver: Steatosis. No focal mass.  Biliary system: Gallbladder is within normal limits. No intrahepatic  or extrahepatic biliary ductal dilatation.  Pancreas: No focal mass or dilation of the main pancreatic duct.  Pancreatic divisum.  Stomach: Within normal limits.  Spleen: Normal in size. Unchanged 7 mm enhancing focus in the mid  parenchyma, likely a hemangioma.  Adrenal glands: Similar appearance of 1.3 cm left adrenal nodule.  Kidneys: No focal mass, hydronephrosis, or stone. Too small to  characterize left renal cortical hypodensity.  Bladder: Within normal limits.  Reproductive organs: Kevan Sleeve in place. Known cervical malignancy  is better characterized on MRI 5/17/2024.  Colon: Subtle fat stranding along the diverticula in the descending  colon (series 4 image 62)  Appendix:  Within normal limits.  Small Bowel: Within normal limits.  Lymph nodes: No intra-abdominal or pelvic lymphadenopathy.  Vasculature: Within normal limits.  Peritoneum: No intraabdominal free fluid or air.      Lung bases: Similar pulmonary cysts.     Bones and soft tissues: No suspicious soft tissue mass or fluid  collection. No suspicious osseous lesion.Small fat-containing  umbilical hernia.                                                                      IMPRESSION:   1. Subtle fat stranding along the diverticula in the descending colon  is suspicious for early uncomplicated diverticulitis.  2. Known cervical malignancy with brachytherapy device is better  characterized on MRI 5/17/2024.  3. Unchanged 1.3 cm nodule in the left adrenal gland which was not FDG  avid on prior PET/CT.     I have personally reviewed the examination and initial interpretation  and I agree with the findings.        Narrative & Impression   EXAM: XR CHEST 2 VIEWS  5/28/2024 11:15 AM       HISTORY: cough, myalgias     COMPARISON: None.     FINDINGS: Two views of the chest. Trachea is midline. Cardiac  silhouette is within normal limits. No focal consolidation. No pleural  effusion or pneumothorax. Visualized upper abdomen is unremarkable. No  acute osseous abnormalities.                                                                      IMPRESSION: No focal airspace disease.     I have personally reviewed the examination and initial interpretation  and I agree with the findings.     JULIUS RAND MD        No results found for this or any previous visit (from the past 24 hour(s)).    Labs, vital signs, and imaging studies were reviewed by me.    Medications   sodium chloride 0.9% BOLUS 1,000 mL (0 mLs Intravenous Stopped 5/28/24 1445)   ketorolac (TORADOL) injection 10 mg (10 mg Intravenous $Given 5/28/24 1022)   iopamidol (ISOVUE-370) solution 122 mL (122 mLs Intravenous $Given 5/28/24 1253)   sodium chloride (PF) 0.9% PF  flush 80 mL (80 mLs Intravenous $Given 5/28/24 4625)       Assessments & Plan (with Medical Decision Making)   Michelle Mills is a 53 year old female who presents to the emergency department with generalized myalgias associated with headache, left lower quadrant abdominal pain, fatigue, and weakness.  No neck pain or stiffness to suggest meningitis.  Differential diagnosis includes diverticulitis, UTI/pyelonephritis, other intra-abdominal infection, viral syndrome, pneumonia.  Labs, chest x-ray, CT of the abdomen pelvis, urinalysis ordered to further evaluate the patient in the emergency department.  Toradol given for symptomatic relief in the emergency department.    Laboratory workup is remarkable for white blood cell count 2.4, hemoglobin 9.7, urinalysis does not appear consistent with UTI, CMP shows creatinine 1.03, glucose 1005, sodium 130.    IV fluids were given in the emergency department.    Patient reports significant improvement of her symptoms after Toradol was given.    CT shows findings consistent with early uncomplicated diverticulitis.  Will start IV antibiotics here in the emergency department.  Given patient's history, considered inpatient admission for further management.  However, as patient has stable vital signs, does not have elevated white blood cell count, and appears clinically well, I think that patient can be managed outpatient with oral antibiotics after antibiotics are initiated in the emergency department.    Patient was discussed with gynecologic oncology team, they agree with plan for dose of IV antibiotics here to be followed by oral antibiotics at home and outpatient follow-up.    Critical care was not performed.     Medical Decision Making  The patient's presentation was of high complexity (an acute health issue posing potential threat to life or bodily function).    The patient's evaluation involved:  ordering and/or review of 3+ test(s) in this encounter (see separate area of  note for details)  independent interpretation of testing performed by another health professional (CT)  discussion of management or test interpretation with another health professional (gynecologic oncology)    The patient's management necessitated moderate risk (prescription drug management including medications given in the ED) and high risk (a decision regarding hospitalization).    CT, x-ray images were personally reviewed by me, I agree with the radiology reads.    I have reviewed the nursing notes.    I have reviewed the findings, diagnosis, plan and need for follow up with the patient.    Patient to be discharged home. Advised to follow up with PCP and gynecologic oncology. To return to ER immediately with any new/worsening symptoms. Plan of care discussed with patient who expresses understanding and agrees with plan of care.    Discharge Medication List as of 5/28/2024  4:46 PM        START taking these medications    Details   ciprofloxacin (CIPRO) 500 MG tablet Take 1 tablet (500 mg) by mouth 2 times daily for 10 days, Disp-20 tablet, R-0, E-Prescribe      HYDROcodone-acetaminophen (NORCO) 5-325 MG tablet Take 1 tablet by mouth every 6 hours as needed for pain, Disp-12 tablet, R-0, E-Prescribe      metroNIDAZOLE (FLAGYL) 500 MG tablet Take 1 tablet (500 mg) by mouth 3 times daily for 7 days, Disp-21 tablet, R-0, E-PrescribeEat yogurt or cottage cheese daily to prevent diarrhea that can be caused by taking this medication.      hydrocortisone (ANUSOL-HC) 25 MG suppository Place 1 suppository (25 mg) rectally 2 times daily, Disp-30 suppository, R-4, E-Prescribe             Final diagnoses:   Lower abdominal pain   Myalgia   Diverticulitis       LENCHO CARDONA MD  5/28/2024   [facility] EMERGENCY DEPARTMENT       Lencho Cardona MD  05/29/24 0660

## 2024-05-28 NOTE — DISCHARGE INSTRUCTIONS
TODAY'S VISIT:  You were seen today for abdominal pain   -   - If you had any labs or imaging/radiology tests performed today, you should also discuss these tests with your usual provider.     FOLLOW-UP:  Please make an appointment to follow up with:  - Your Primary Care Provider. If you do not have a PCP, please call the Primary Care Center (phone: [phone] for an appointment    - Have your provider review the results from today's visit with you again to make sure no further follow-up or additional testing is needed based on those results.     RETURN TO THE EMERGENCY DEPARTMENT  Return to the Emergency Department at any time for any new or worsening symptoms or any concerns.

## 2024-05-28 NOTE — LETTER
2024         RE: Michelle Mills  [address]  [facility] [address]        Dear Colleague,    Thank you for referring your patient, Michelle Mills, to the [facility] RADIATION ONCOLOGY. Please see a copy of my visit note below.    RADIATION ONCOLOGY WEEKLY ON TREATMENT VISIT   Encounter Date: May 28, 2024    Patient Name: Michelle Mills  MRN: [id number]  : 1970     Disease and Stage: Squamous cell carcinoma of the cervix, FIGO IB3   Treatment Site: Pelvis  Current Dose/Planned Total Dose: [4500] cGy / [4500] cGy followed by HDR brachytherapy    Concurrent Chemotherapy: Yes  Drug and Frequency: Weekly cisplatin    Gynecologic Oncologist: Katiuska Edward MD    Subjective: Ms. Mills presents to clinic today for her weekly on-treatment visit. She has blood streaks in her bowel movements. She continues to have sharp and stabbing pain with defecation. She is using hemorroid cream. She has diarrhea and is taking 1 Imodium BID as needed, which is helping. She presented to the ER today with generalized body aches and is being evaluated.    Nursing ROS:   Nutrition Alteration  Diet Type: Patient's Preference  Skin  Skin Reaction: 2 - Moderate to brisk erythema, patchy moist desquamation, mostly confined to skin folds and creases, moderate edema     ENT and Mouth Exam  Mucositis - Current: 0 - None      Gastrointestinal  Nausea: 0 - None  Diarrhea: 0 - None  Genitourinary  Urinary Status: 0 - Normal  Psychosocial  Mood - Anxiety: 1 - Mild mood alteration  Pain Assessment  0-10 Pain Scale: 7  Pain Note: Oxycodone     Objective:   BP (!) 150/74   Pulse 82   Wt 89.8 kg (198 lb)   BMI 38.67 kg/m    Gen: Appears well, fatigued  Abdomen: Nondistended  Skin: No erythema    Laboratory:  Lab Results   Component Value Date    WBC 2.4 (L) 2024    HGB 9.7 (L) 2024    HCT 28.8 (L) 2024    MCV 94 2024     (L) 2024     Lab Results   Component Value Date    CR 1.03 (H)  05/28/2024     Lab Results   Component Value Date     (L) 05/28/2024    POTASSIUM 3.9 05/28/2024    CHLORIDE 92 (L) 05/28/2024    CO2 24 05/28/2024     (H) 05/28/2024     Lab Results   Component Value Date    AST 27 05/28/2024    ALT 17 05/28/2024    ALKPHOS 105 05/28/2024    BILITOTAL 0.5 05/28/2024     Magnesium   Date Value Ref Range Status   05/20/2024 1.2 (L) 1.7 - 2.3 mg/dL Final     Treatment-related toxicities (CTCAE v5.0):  Anorexia: Grade 0: No toxicity  Fatigue: Grade 1: Fatigue relieved by rest  Nausea: Grade 0: No toxicity  Pain: Grade 2: Moderate pain; limiting instrumental ADL  Diarrhea: Grade 1: Increase of <4 stools per day over baseline; mild increase in ostomy output compared to baseline  Urinary frequency: Grade 1: Present  Urinary tract pain: Grade 1: Mild pain  Urinary urgency: Grade 0: No toxicity  Dermatitis: Grade 0: No toxicity    ED visits/Hospitalizations:  05/28/24 - Generalized body aches    Missed Treatments:  05/14/24 - Machine downtime    Mosaiq chart and setup information reviewed  IGRT images reviewed    Assessment:    Ms. Mills is a 53 year old female with a squamous cell carcinoma of the cervix, FIGO IB3, with tumor size greater than 4 cm, who is receiving definitive chemoradiation. She is tolerating radiation as expected.    Plan:   Continue radiation as planned  Continue Imodium  Reviewed skin hygiene, moisturizer  ContinueTucks wipes and barrier cream with either dimethicone or Cavilon.  Continue squirt bottle to aid with urination burning.  Vaginal discharge is expected with early chemoRT, will ctm  6.   Patient has agreed to undergo anesthesia for Kevan sleeve placement tomorrow followed by 5 tandem and ring placements. She understands need for garcia catheter for procedures.  7.   Anusol HC suppositories bid for rectal pain  8.   Plan for HDR brachytherapy 5 fractions      Dede Henry MD  Department of Radiation Oncology  [facility]  Center            Again, thank you for allowing me to participate in the care of your patient.        Sincerely,        Dede Henry MD

## 2024-05-28 NOTE — ED TRIAGE NOTES
Patient arrives to ED reporting generalized body aches and pain for the past few days. Patient endorses HA, fatigue and weakness.     Patient states she took motrin in the middle of the night. States no improvement after taking medication.

## 2024-05-28 NOTE — PROGRESS NOTES
RADIATION ONCOLOGY WEEKLY ON TREATMENT VISIT   Encounter Date: May 28, 2024    Patient Name: Michelle Mills  MRN: [id number]  : 1970     Disease and Stage: Squamous cell carcinoma of the cervix, FIGO IB3   Treatment Site: Pelvis  Current Dose/Planned Total Dose: [4500] cGy / [4500] cGy followed by HDR brachytherapy    Concurrent Chemotherapy: Yes  Drug and Frequency: Weekly cisplatin    Gynecologic Oncologist: Katiuska Edward MD    Subjective: Ms. Mills presents to clinic today for her weekly on-treatment visit. She has blood streaks in her bowel movements. She continues to have sharp and stabbing pain with defecation. She is using hemorroid cream. She has diarrhea and is taking 1 Imodium BID as needed, which is helping. She presented to the ER today with generalized body aches and is being evaluated.    Nursing ROS:   Nutrition Alteration  Diet Type: Patient's Preference  Skin  Skin Reaction: 2 - Moderate to brisk erythema, patchy moist desquamation, mostly confined to skin folds and creases, moderate edema     ENT and Mouth Exam  Mucositis - Current: 0 - None      Gastrointestinal  Nausea: 0 - None  Diarrhea: 0 - None  Genitourinary  Urinary Status: 0 - Normal  Psychosocial  Mood - Anxiety: 1 - Mild mood alteration  Pain Assessment  0-10 Pain Scale: 7  Pain Note: Oxycodone     Objective:   BP (!) 150/74   Pulse 82   Wt 89.8 kg (198 lb)   BMI 38.67 kg/m    Gen: Appears well, fatigued  Abdomen: Nondistended  Skin: No erythema    Laboratory:  Lab Results   Component Value Date    WBC 2.4 (L) 2024    HGB 9.7 (L) 2024    HCT 28.8 (L) 2024    MCV 94 2024     (L) 2024     Lab Results   Component Value Date    CR 1.03 (H) 2024     Lab Results   Component Value Date     (L) 2024    POTASSIUM 3.9 2024    CHLORIDE 92 (L) 2024    CO2 24 2024     (H) 2024     Lab Results   Component Value Date    AST 27 2024    ALT 17  05/28/2024    ALKPHOS 105 05/28/2024    BILITOTAL 0.5 05/28/2024     Magnesium   Date Value Ref Range Status   05/20/2024 1.2 (L) 1.7 - 2.3 mg/dL Final     Treatment-related toxicities (CTCAE v5.0):  Anorexia: Grade 0: No toxicity  Fatigue: Grade 1: Fatigue relieved by rest  Nausea: Grade 0: No toxicity  Pain: Grade 2: Moderate pain; limiting instrumental ADL  Diarrhea: Grade 1: Increase of <4 stools per day over baseline; mild increase in ostomy output compared to baseline  Urinary frequency: Grade 1: Present  Urinary tract pain: Grade 1: Mild pain  Urinary urgency: Grade 0: No toxicity  Dermatitis: Grade 0: No toxicity    ED visits/Hospitalizations:  05/28/24 - Generalized body aches    Missed Treatments:  05/14/24 - Machine downtime    Mosaiq chart and setup information reviewed  IGRT images reviewed    Assessment:    Ms. Mills is a 53 year old female with a squamous cell carcinoma of the cervix, FIGO IB3, with tumor size greater than 4 cm, who is receiving definitive chemoradiation. She is tolerating radiation as expected.    Plan:   Continue radiation as planned  Continue Imodium  Reviewed skin hygiene, moisturizer  ContinueTucks wipes and barrier cream with either dimethicone or Cavilon.  Continue squirt bottle to aid with urination burning.  Vaginal discharge is expected with early chemoRT, will ctm  6.   Patient has agreed to undergo anesthesia for Kevan sleeve placement tomorrow followed by 5 tandem and ring placements. She understands need for garcia catheter for procedures.  7.   Anusol HC suppositories bid for rectal pain  8.   Plan for HDR brachytherapy 5 fractions      Dede Henry MD  Department of Radiation Oncology  [facility]

## 2024-05-29 ENCOUNTER — ANESTHESIA (OUTPATIENT)
Dept: GASTROENTEROLOGY | Facility: CLINIC | Age: 54
End: 2024-05-29
Payer: COMMERCIAL

## 2024-06-02 ENCOUNTER — ANESTHESIA EVENT (OUTPATIENT)
Dept: GASTROENTEROLOGY | Facility: CLINIC | Age: 54
End: 2024-06-02
Payer: COMMERCIAL

## 2024-06-02 LAB
BACTERIA BLD CULT: NO GROWTH
BACTERIA BLD CULT: NO GROWTH

## 2024-06-03 ENCOUNTER — HOSPITAL ENCOUNTER (OUTPATIENT)
Dept: MRI IMAGING | Facility: CLINIC | Age: 54
Discharge: HOME | End: 2024-06-03
Attending: RADIOLOGY | Admitting: INTERNAL MEDICINE
Payer: COMMERCIAL

## 2024-06-03 ENCOUNTER — ANESTHESIA (OUTPATIENT)
Dept: GASTROENTEROLOGY | Facility: CLINIC | Age: 54
End: 2024-06-03
Payer: COMMERCIAL

## 2024-06-03 ENCOUNTER — OFFICE VISIT (OUTPATIENT)
Dept: RADIATION ONCOLOGY | Facility: CLINIC | Age: 54
End: 2024-06-03
Attending: RADIOLOGY
Payer: COMMERCIAL

## 2024-06-03 ENCOUNTER — HOSPITAL ENCOUNTER (OUTPATIENT)
Facility: CLINIC | Age: 54
Discharge: ANOTHER HOSPITAL | End: 2024-06-03
Attending: INTERNAL MEDICINE | Admitting: INTERNAL MEDICINE
Payer: COMMERCIAL

## 2024-06-03 VITALS — TEMPERATURE: 98.8 F | DIASTOLIC BLOOD PRESSURE: 89 MMHG | OXYGEN SATURATION: 100 % | SYSTOLIC BLOOD PRESSURE: 123 MMHG

## 2024-06-03 VITALS — OXYGEN SATURATION: 95 % | SYSTOLIC BLOOD PRESSURE: 146 MMHG | HEART RATE: 110 BPM | DIASTOLIC BLOOD PRESSURE: 88 MMHG

## 2024-06-03 DIAGNOSIS — C53.1 MALIGNANT NEOPLASM OF EXOCERVIX (H): ICD-10-CM

## 2024-06-03 DIAGNOSIS — C53.9 CERVICAL CANCER (H): Primary | ICD-10-CM

## 2024-06-03 PROCEDURE — 77290 THER RAD SIMULAJ FIELD CPLX: CPT | Mod: 26 | Performed by: RADIOLOGY

## 2024-06-03 PROCEDURE — 57155 INSERT UTERI TANDEM/OVOIDS: CPT | Performed by: RADIOLOGY

## 2024-06-03 PROCEDURE — 250N000009 HC RX 250

## 2024-06-03 PROCEDURE — 271N000005 HC IMPLANT RADIATION BRIEF: Performed by: RADIOLOGY

## 2024-06-03 PROCEDURE — C1717 BRACHYTX, NON-STR,HDR IR-192: HCPCS | Performed by: RADIOLOGY

## 2024-06-03 PROCEDURE — 72195 MRI PELVIS W/O DYE: CPT | Mod: 26 | Performed by: RADIOLOGY

## 2024-06-03 PROCEDURE — 370N000017 HC ANESTHESIA TECHNICAL FEE, PER MIN

## 2024-06-03 PROCEDURE — 77771 HDR RDNCL NTRSTL/ICAV BRCHTX: CPT | Performed by: RADIOLOGY

## 2024-06-03 PROCEDURE — 77295 3-D RADIOTHERAPY PLAN: CPT | Performed by: RADIOLOGY

## 2024-06-03 PROCEDURE — 258N000003 HC RX IP 258 OP 636

## 2024-06-03 PROCEDURE — 72195 MRI PELVIS W/O DYE: CPT

## 2024-06-03 PROCEDURE — 77290 THER RAD SIMULAJ FIELD CPLX: CPT | Performed by: RADIOLOGY

## 2024-06-03 PROCEDURE — 250N000011 HC RX IP 250 OP 636

## 2024-06-03 PROCEDURE — 77295 3-D RADIOTHERAPY PLAN: CPT | Mod: 26 | Performed by: RADIOLOGY

## 2024-06-03 PROCEDURE — 57155 INSERT UTERI TANDEM/OVOIDS: CPT | Performed by: ANESTHESIOLOGY

## 2024-06-03 PROCEDURE — 57155 INSERT UTERI TANDEM/OVOIDS: CPT

## 2024-06-03 PROCEDURE — 77771 HDR RDNCL NTRSTL/ICAV BRCHTX: CPT | Mod: 26 | Performed by: RADIOLOGY

## 2024-06-03 RX ORDER — LIDOCAINE HYDROCHLORIDE 20 MG/ML
INJECTION, SOLUTION INFILTRATION; PERINEURAL PRN
Status: DISCONTINUED | OUTPATIENT
Start: 2024-06-03 | End: 2024-06-03

## 2024-06-03 RX ORDER — ONDANSETRON 2 MG/ML
INJECTION INTRAMUSCULAR; INTRAVENOUS PRN
Status: DISCONTINUED | OUTPATIENT
Start: 2024-06-03 | End: 2024-06-03

## 2024-06-03 RX ORDER — OXYCODONE HYDROCHLORIDE 5 MG/1
5 TABLET ORAL
Status: CANCELLED | OUTPATIENT
Start: 2024-06-03

## 2024-06-03 RX ORDER — ONDANSETRON 4 MG/1
4 TABLET, ORALLY DISINTEGRATING ORAL EVERY 30 MIN PRN
Status: CANCELLED | OUTPATIENT
Start: 2024-06-03

## 2024-06-03 RX ORDER — ONDANSETRON 2 MG/ML
4 INJECTION INTRAMUSCULAR; INTRAVENOUS EVERY 30 MIN PRN
Status: CANCELLED | OUTPATIENT
Start: 2024-06-03

## 2024-06-03 RX ORDER — OXYCODONE HYDROCHLORIDE 10 MG/1
10 TABLET ORAL
Status: CANCELLED | OUTPATIENT
Start: 2024-06-03

## 2024-06-03 RX ORDER — PROPOFOL 10 MG/ML
INJECTION, EMULSION INTRAVENOUS CONTINUOUS PRN
Status: DISCONTINUED | OUTPATIENT
Start: 2024-06-03 | End: 2024-06-03

## 2024-06-03 RX ORDER — KETOROLAC TROMETHAMINE 30 MG/ML
INJECTION, SOLUTION INTRAMUSCULAR; INTRAVENOUS PRN
Status: DISCONTINUED | OUTPATIENT
Start: 2024-06-03 | End: 2024-06-03

## 2024-06-03 RX ORDER — DEXAMETHASONE SODIUM PHOSPHATE 4 MG/ML
4 INJECTION, SOLUTION INTRA-ARTICULAR; INTRALESIONAL; INTRAMUSCULAR; INTRAVENOUS; SOFT TISSUE
Status: CANCELLED | OUTPATIENT
Start: 2024-06-03

## 2024-06-03 RX ORDER — PROPOFOL 10 MG/ML
INJECTION, EMULSION INTRAVENOUS PRN
Status: DISCONTINUED | OUTPATIENT
Start: 2024-06-03 | End: 2024-06-03

## 2024-06-03 RX ORDER — NALOXONE HYDROCHLORIDE 0.4 MG/ML
0.1 INJECTION, SOLUTION INTRAMUSCULAR; INTRAVENOUS; SUBCUTANEOUS
Status: CANCELLED | OUTPATIENT
Start: 2024-06-03

## 2024-06-03 RX ORDER — SODIUM CHLORIDE, SODIUM LACTATE, POTASSIUM CHLORIDE, CALCIUM CHLORIDE 600; 310; 30; 20 MG/100ML; MG/100ML; MG/100ML; MG/100ML
INJECTION, SOLUTION INTRAVENOUS CONTINUOUS PRN
Status: DISCONTINUED | OUTPATIENT
Start: 2024-06-03 | End: 2024-06-03

## 2024-06-03 RX ADMIN — PROPOFOL 40 MG: 10 INJECTION, EMULSION INTRAVENOUS at 08:01

## 2024-06-03 RX ADMIN — PROPOFOL 150 MCG/KG/MIN: 10 INJECTION, EMULSION INTRAVENOUS at 08:01

## 2024-06-03 RX ADMIN — MIDAZOLAM 2 MG: 1 INJECTION INTRAMUSCULAR; INTRAVENOUS at 07:56

## 2024-06-03 RX ADMIN — PROPOFOL 30 MG: 10 INJECTION, EMULSION INTRAVENOUS at 08:04

## 2024-06-03 RX ADMIN — LIDOCAINE HYDROCHLORIDE 80 MG: 20 INJECTION, SOLUTION INFILTRATION; PERINEURAL at 08:02

## 2024-06-03 RX ADMIN — SODIUM CHLORIDE, POTASSIUM CHLORIDE, SODIUM LACTATE AND CALCIUM CHLORIDE: 600; 310; 30; 20 INJECTION, SOLUTION INTRAVENOUS at 08:04

## 2024-06-03 RX ADMIN — ONDANSETRON 4 MG: 2 INJECTION INTRAMUSCULAR; INTRAVENOUS at 08:08

## 2024-06-03 RX ADMIN — PROPOFOL 40 MG: 10 INJECTION, EMULSION INTRAVENOUS at 08:06

## 2024-06-03 RX ADMIN — KETOROLAC TROMETHAMINE 30 MG: 30 INJECTION, SOLUTION INTRAMUSCULAR at 08:11

## 2024-06-03 ASSESSMENT — ACTIVITIES OF DAILY LIVING (ADL)
ADLS_ACUITY_SCORE: 35

## 2024-06-03 NOTE — ANESTHESIA PREPROCEDURE EVALUATION
"Anesthesia Pre-Procedure Evaluation    Patient: Michelle Mills   MRN: [id number] : 1970        Procedure : Procedure(s):  ANESTHESIA OUT OF OR RAD THERAPY Tandem and Ring Procedure @0730          Past Medical History:   Diagnosis Date    Anemia     HTN (hypertension)     Hypomagnesemia     Malignant neoplasm of overlapping sites of cervix (H)     Thrombocytopenia (H24)     Tobacco abuse       Past Surgical History:   Procedure Laterality Date    EXAM UNDER ANESTHESIA, INSERT KENDY SLEEVE, UTERINE PLACEMENT OF TANDEM AND RING FOR RAD, ULTRASOUND N/A 2024    Procedure: EXAM UNDER ANESTHESIA, GYNECOLOGIC, WITH INSERTION OF KENDY SLEEVE, WITH ULTRASOUND GUIDANCE;  Surgeon: Dede Henry MD;  Location: [facility] OR      Allergies   Allergen Reactions    Codeine      Other Reaction(s): Unknown      Social History     Tobacco Use    Smoking status: Every Day     Types: Cigarettes     Passive exposure: Current    Smokeless tobacco: Never   Substance Use Topics    Alcohol use: Yes     Comment: Occ      Wt Readings from Last 1 Encounters:   24 89.8 kg (198 lb)              OUTSIDE LABS:  CBC:   Lab Results   Component Value Date    WBC 2.4 (L) 2024    WBC 3.4 (L) 2024    HGB 9.7 (L) 2024    HGB 10.1 (L) 2024    HCT 28.8 (L) 2024    HCT 30.6 (L) 2024     (L) 2024     (L) 2024     BMP:   Lab Results   Component Value Date     (L) 2024     2024    POTASSIUM 3.9 2024    POTASSIUM 4.0 2024    CHLORIDE 92 (L) 2024    CHLORIDE 100 2024    CO2 24 2024    CO2 26 2024    BUN 15.8 2024    BUN 9.9 2024    CR 1.03 (H) 2024    CR 0.93 2024     (H) 2024     (H) 2024     COAGS: No results found for: \"PTT\", \"INR\", \"FIBR\"  POC: No results found for: \"BGM\", \"HCG\", \"HCGS\"  HEPATIC:   Lab Results   Component Value Date    ALBUMIN 3.8 2024    PROTTOTAL " 7.2 05/28/2024    ALT 17 05/28/2024    AST 27 05/28/2024    ALKPHOS 105 05/28/2024    BILITOTAL 0.5 05/28/2024     OTHER:   Lab Results   Component Value Date    IVON 9.1 05/28/2024    MAG 1.2 (L) 05/20/2024    LIPASE 13 05/28/2024       Anesthesia Plan    ASA Status:  3       Anesthesia Type: MAC.              Consents            Postoperative Care            Comments:               Zia Vargas MD    I have reviewed the pertinent notes and labs in the chart from the past 30 days and (re)examined the patient.  Any updates or changes from those notes are reflected in this note.     # Hyponatremia: Lowest Na = 130 mmol/L in last 30 days, will monitor as appropriate    # Hypomagnesemia: Lowest Mg = 1.2 mg/dL in last 30 days, will replace as needed      # Thrombocytopenia: Lowest platelets = 116 in last 30 days, will monitor for bleeding  # Obesity: Estimated body mass index is 38.67 kg/m  as calculated from the following:    Height as of 5/21/24: 1.524 m (5').    Weight as of 5/28/24: 89.8 kg (198 lb).

## 2024-06-03 NOTE — LETTER
6/3/2024         RE: Michelle Mills  [address]  [facility] [address]        Dear Colleague,    Thank you for referring your patient, Michelle Mills, to the [facility] RADIATION ONCOLOGY. Please see a copy of my visit note below.    Michelle Mills is here for scheduled HDR brachytherapy    HDR Tandem and Ring   1    Pre-procedure-  Patient identified, arm band applied, signed consent available   Medications taken by patient prior to procedure per CRNA.  Pain assessment: 0/10    Waiting:  BP (!) 146/88   Pulse 110   SpO2 95%   Pt resting quietly, bed low and locked, call light in reach.     Post-procedure-  Pain assessment: Discomfort with removal, pt tolerated well. Pt up to BR r/t diarrhea, will take imodium prior to Wednesday procedure. Device removed without difficulty, Education for possible side effects and management, and Discharged to home, pt daughter as her .         Again, thank you for allowing me to participate in the care of your patient.        Sincerely,        Lucy Greenberg MD

## 2024-06-03 NOTE — ANESTHESIA CARE TRANSFER NOTE
Patient: Michelle Mills    Procedure: Procedure(s):  ANESTHESIA OUT OF OR RAD THERAPY Tandem and Ring Procedure @0764       Diagnosis: Malignant neoplasm of overlapping sites of cervix (H) [C53.8]  Diagnosis Additional Information: No value filed.    Anesthesia Type:   MAC     Note:    Oropharynx: oropharynx clear of all foreign objects and spontaneously breathing  Level of Consciousness: awake        Dentition: dentition unchanged  Vital Signs Stable: post-procedure vital signs reviewed and stable  Report to RN Given: handoff report given  Destination: to MRI dept.          Vitals:  Vitals Value Taken Time   /88    Temp     Pulse 107    Resp 14    SpO2 100%        Electronically Signed By: ANDREW Bray CRNA  Ashley 3, 2024  8:16 AM

## 2024-06-03 NOTE — PROGRESS NOTES
Michelle THOM Mills is here for scheduled HDR brachytherapy    HDR Tandem and Ring   1    Pre-procedure-  Patient identified, arm band applied, signed consent available   Medications taken by patient prior to procedure per CRNA.  Pain assessment: 0/10    Waiting:  BP (!) 146/88   Pulse 110   SpO2 95%   Pt resting quietly, bed low and locked, call light in reach.     Post-procedure-  Pain assessment: Discomfort with removal, pt tolerated well. Pt up to BR r/t diarrhea, will take imodium prior to Wednesday procedure. Device removed without difficulty, Education for possible side effects and management, and Discharged to home, pt daughter as her .

## 2024-06-03 NOTE — PATIENT INSTRUCTIONS
You will be having a type of treatment called High Dose Radiation (HDR) therapy.  You will have this near the end of your course of radiation therapy.    Prior to your first HDR treatment, you will see your primary care provider or Gyn nurse practitioner for a pre-op visit.  You will get a call from the surgery department with specific pre-op instructions including when to stop eating and drinking prior to surgery as well as check in instructions.  Preparing for HDR treatments:    You will be scheduled to have sedation for this procedure.  If so:    A. The nurse will tell you when to arrive and give you specific instructions to prepare for the sedation.  This will include not eating or drinking for six hours prior to arrival.   b.  You will check in on the scheduled day at the Endoscopy center on the first floor of   the hospital.Then you will be directed to the pre-surgery area to prepare.   c.  Your Radiation Oncology doctor and nurse will come to the procedure room to insert   the treatment tube and a urinary catheter.  You will be asleep for this procedure.   When you wake up you will be brought to the Radiation oncology department   for the radiation therapy treatment.  You will have an MRI (sometimes a CT scan also) and then wait for treatment planning.  This may take several hours. While the planning is being completed it is important for you to lie flat so that the vaginal treatment device stays in place.  When the planning is done, we will bring you to the treatment room to have your treatment.  You will be brought into an exam room.  The doctor or nurse will remove the vaginal treatment device and urinary catheter.  You will receive instructions for home and your next visit.   You MUST have a  to bring you home.  Post Sedation instructions:   1.  Get plenty of rest.  A responsible adult must stay with you for at least 24 hours after   you leave the hospital   2.  Do not drive or use heavy  equipment.  If you have weakness or tingling, don't drive   or use heavy equipment until this feeling goes away.   3.  Do not drink alcohol for 24 hours   4.  Avoid strenuous or risky activities.  Ask for help when climbing stairs   5.  You may feel lightheaded.  IF so, sit for a few minutes before standing.  Have    someone help you get up.   6.  If you have nausea: Drink only clear liquids.  Be sure to drink enough fluids.  Move   to a regular diet as you feel able.   7.  You may have a dry mouth, a sore throat, muscle aches or trouble sleeping.  These   should go away after 24 hours.   8.  Do not make important or legal decisions.  When to call your doctor:   Some minor bleeding /spotting is normal after this procedure. Call if you have bright red vaginal bleeding.    If vaginal discharge has a bad odor.  If you have a fever over 100.5 F.  If you are not able to urinate more than 6 hours after the urinary catheter is removed.  If you have increased urinary burning when using the bathroom. You may have some minor burning with urination for a few days after the catheter is removed.    [facility] Radiation Oncology: [phone]

## 2024-06-03 NOTE — OP NOTE
PREOPERATIVE DIAGNOSIS: Invasive squamous cell carcinoma of cervix, FIGO IB3  POSTOPERATIVE DIAGNOSIS: Invasive squamous cell carcinoma of cervix, FIGO IB3     PROCEDURE PERFORMED: Placement of tandem and ring apparatus for HDR brachytherapy, 1 of 5      SURGEON: Lucy Greenberg MD Radiation Oncology     ASSISTANT:  Linda Espino, PGY4 Radiation Oncology     ANESTHESIA: Conscious sedation       COMPLICATIONS: None      ESTIMATED BLOOD LOSS: None      INTRAVENOUS FLUIDS: Per anesthesia record       OPERATIVE FINDINGS: Normal external genitalia. Kevan sleeve in place.        DESCRIPTION OF OPERATIVE PROCEDURE:  Ms. Mills was brought back to the operating room and identified to be herself. A time-out was performed and the patient was given conscious sedation. She was placed in the dorsal lithotomy position. Using sterile technique, a garcia catheter was inserted into the bladder and 8 cc of saline was used to inflate the balloon. Speculum examination was performed with cervical os and Kevan sleeve easily identified. A 60 mm, 60 degree tandem applicator was placed into the Kevan sleeve and secured in the proper fashion. A 3.0 cm ring applicator was placed and secured. The implant secured with radiation panties and MRI compatible clamps.  Anesthesia was then reversed and the patient was brought to MRI.      Linda Espino MD PGY4  Radiation Oncology       I was present and supervised the entire procedure.     Lucy Greenberg MD

## 2024-06-04 ASSESSMENT — LIFESTYLE VARIABLES: TOBACCO_USE: 1

## 2024-06-04 ASSESSMENT — ENCOUNTER SYMPTOMS: SEIZURES: 0

## 2024-06-04 NOTE — ANESTHESIA PREPROCEDURE EVALUATION
Anesthesia Pre-Procedure Evaluation    Patient: Michelle Mills   MRN: [id number] : 1970        Procedure : Procedure(s):  ANESTHESIA OUT OF OR RAD THERAPY Tandem and Ring Procedure @0730          Seen in the preoperative assessment clinic on 24, please see this note for further details    Past Medical History:   Diagnosis Date    Anemia     HTN (hypertension)     Hypomagnesemia     Malignant neoplasm of overlapping sites of cervix (H)     Thrombocytopenia (H24)     Tobacco abuse       Past Surgical History:   Procedure Laterality Date    EXAM UNDER ANESTHESIA, INSERT KENDY SLEEVE, UTERINE PLACEMENT OF TANDEM AND RING FOR RAD, ULTRASOUND N/A 2024    Procedure: EXAM UNDER ANESTHESIA, GYNECOLOGIC, WITH INSERTION OF KENDY SLEEVE, WITH ULTRASOUND GUIDANCE;  Surgeon: Dede Henry MD;  Location: [facility] OR      Allergies   Allergen Reactions    Codeine      Other Reaction(s): Unknown      Social History     Tobacco Use    Smoking status: Every Day     Types: Cigarettes     Passive exposure: Current    Smokeless tobacco: Never   Substance Use Topics    Alcohol use: Yes     Comment: Occ      Wt Readings from Last 1 Encounters:   24 89.8 kg (198 lb)        Anesthesia Evaluation   Pt has not had prior anesthetic         ROS/MED HX  ENT/Pulmonary:     (+)     ANTONIA risk factors, snores loudly, hypertension, obese,        tobacco use, Current use,                       Neurologic:  - neg neurologic ROS  (-) no seizures, no CVA and no TIA   Cardiovascular:     (+)  hypertension- -   -  - -                                      METS/Exercise Tolerance: >4 METS    Hematologic: Comments: Thrombocytopenia     (+)      anemia,          Musculoskeletal:       GI/Hepatic: Comment: Pain with defecation     (+) GERD (food related), Other,                  Renal/Genitourinary: Comment: Dysuria       Endo: Comment: Hypomagnesemia     (+)            Chronic steroid usage for  Date most recently used: patient takes  "decadron with chemotherapy. Obesity,       Psychiatric/Substance Use:  - neg psychiatric ROS     Infectious Disease:  - neg infectious disease ROS     Malignancy:   (+) Malignancy, History of Other.Other CA cervical cancer status post Chemo and Radiation.    Other:  - neg other ROS    (+)  , H/O Chronic Pain,            OUTSIDE LABS:  CBC:   Lab Results   Component Value Date    WBC 2.4 (L) 05/28/2024    WBC 3.4 (L) 05/20/2024    HGB 9.7 (L) 05/28/2024    HGB 10.1 (L) 05/20/2024    HCT 28.8 (L) 05/28/2024    HCT 30.6 (L) 05/20/2024     (L) 05/28/2024     (L) 05/20/2024     BMP:   Lab Results   Component Value Date     (L) 05/28/2024     05/20/2024    POTASSIUM 3.9 05/28/2024    POTASSIUM 4.0 05/20/2024    CHLORIDE 92 (L) 05/28/2024    CHLORIDE 100 05/20/2024    CO2 24 05/28/2024    CO2 26 05/20/2024    BUN 15.8 05/28/2024    BUN 9.9 05/20/2024    CR 1.03 (H) 05/28/2024    CR 0.93 05/20/2024     (H) 05/28/2024     (H) 05/20/2024     COAGS: No results found for: \"PTT\", \"INR\", \"FIBR\"  POC: No results found for: \"BGM\", \"HCG\", \"HCGS\"  HEPATIC:   Lab Results   Component Value Date    ALBUMIN 3.8 05/28/2024    PROTTOTAL 7.2 05/28/2024    ALT 17 05/28/2024    AST 27 05/28/2024    ALKPHOS 105 05/28/2024    BILITOTAL 0.5 05/28/2024     OTHER:   Lab Results   Component Value Date    IVON 9.1 05/28/2024    MAG 1.2 (L) 05/20/2024    LIPASE 13 05/28/2024       Anesthesia Plan    ASA Status:  3       Anesthesia Type: MAC.     - Reason for MAC: straight local not clinically adequate              Consents            Postoperative Care       PONV prophylaxis: Background Propofol Infusion     Comments:               Rayn Rios MD    I have reviewed the pertinent notes and labs in the chart from the past 30 days and (re)examined the patient.  Any updates or changes from those notes are reflected in this note.     # Hyponatremia: Lowest Na = 130 mmol/L in last 30 days, will monitor as " appropriate    # Hypomagnesemia: Lowest Mg = 1.2 mg/dL in last 30 days, will replace as needed      # Thrombocytopenia: Lowest platelets = 116 in last 30 days, will monitor for bleeding  # Obesity: Estimated body mass index is 38.67 kg/m  as calculated from the following:    Height as of 5/21/24: 1.524 m (5').    Weight as of 5/28/24: 89.8 kg (198 lb).

## 2024-06-05 ENCOUNTER — OFFICE VISIT (OUTPATIENT)
Dept: RADIATION ONCOLOGY | Facility: CLINIC | Age: 54
End: 2024-06-05
Attending: RADIOLOGY
Payer: COMMERCIAL

## 2024-06-05 ENCOUNTER — HOSPITAL ENCOUNTER (OUTPATIENT)
Facility: CLINIC | Age: 54
Discharge: ANOTHER HOSPITAL | End: 2024-06-05
Attending: INTERNAL MEDICINE | Admitting: INTERNAL MEDICINE
Payer: COMMERCIAL

## 2024-06-05 ENCOUNTER — HOSPITAL ENCOUNTER (OUTPATIENT)
Dept: MRI IMAGING | Facility: CLINIC | Age: 54
Discharge: HOME | End: 2024-06-05
Attending: RADIOLOGY | Admitting: INTERNAL MEDICINE
Payer: COMMERCIAL

## 2024-06-05 VITALS
OXYGEN SATURATION: 100 % | TEMPERATURE: 98.3 F | SYSTOLIC BLOOD PRESSURE: 138 MMHG | RESPIRATION RATE: 16 BRPM | DIASTOLIC BLOOD PRESSURE: 87 MMHG

## 2024-06-05 DIAGNOSIS — C53.1 MALIGNANT NEOPLASM OF EXOCERVIX (H): Primary | ICD-10-CM

## 2024-06-05 DIAGNOSIS — C53.1 MALIGNANT NEOPLASM OF EXOCERVIX (H): ICD-10-CM

## 2024-06-05 PROCEDURE — 57155 INSERT UTERI TANDEM/OVOIDS: CPT | Performed by: RADIOLOGY

## 2024-06-05 PROCEDURE — 77771 HDR RDNCL NTRSTL/ICAV BRCHTX: CPT | Performed by: RADIOLOGY

## 2024-06-05 PROCEDURE — 77290 THER RAD SIMULAJ FIELD CPLX: CPT | Performed by: RADIOLOGY

## 2024-06-05 PROCEDURE — 76498 UNLISTED MR PROCEDURE: CPT | Mod: TC

## 2024-06-05 PROCEDURE — 271N000005 HC IMPLANT RADIATION BRIEF: Performed by: RADIOLOGY

## 2024-06-05 PROCEDURE — C1717 BRACHYTX, NON-STR,HDR IR-192: HCPCS | Performed by: RADIOLOGY

## 2024-06-05 PROCEDURE — 57155 INSERT UTERI TANDEM/OVOIDS: CPT | Performed by: ANESTHESIOLOGY

## 2024-06-05 PROCEDURE — 370N000017 HC ANESTHESIA TECHNICAL FEE, PER MIN

## 2024-06-05 PROCEDURE — 250N000009 HC RX 250: Performed by: STUDENT IN AN ORGANIZED HEALTH CARE EDUCATION/TRAINING PROGRAM

## 2024-06-05 PROCEDURE — 77771 HDR RDNCL NTRSTL/ICAV BRCHTX: CPT | Mod: 26 | Performed by: RADIOLOGY

## 2024-06-05 PROCEDURE — 250N000011 HC RX IP 250 OP 636: Performed by: STUDENT IN AN ORGANIZED HEALTH CARE EDUCATION/TRAINING PROGRAM

## 2024-06-05 PROCEDURE — 77290 THER RAD SIMULAJ FIELD CPLX: CPT | Mod: 26 | Performed by: RADIOLOGY

## 2024-06-05 RX ORDER — LIDOCAINE 40 MG/G
CREAM TOPICAL
Status: DISCONTINUED | OUTPATIENT
Start: 2024-06-05 | End: 2024-06-05 | Stop reason: HOSPADM

## 2024-06-05 RX ORDER — LIDOCAINE HYDROCHLORIDE 20 MG/ML
INJECTION, SOLUTION INFILTRATION; PERINEURAL PRN
Status: DISCONTINUED | OUTPATIENT
Start: 2024-06-05 | End: 2024-06-05

## 2024-06-05 RX ORDER — PROPOFOL 10 MG/ML
INJECTION, EMULSION INTRAVENOUS PRN
Status: DISCONTINUED | OUTPATIENT
Start: 2024-06-05 | End: 2024-06-05

## 2024-06-05 RX ORDER — PROPOFOL 10 MG/ML
INJECTION, EMULSION INTRAVENOUS CONTINUOUS PRN
Status: DISCONTINUED | OUTPATIENT
Start: 2024-06-05 | End: 2024-06-05

## 2024-06-05 RX ORDER — KETOROLAC TROMETHAMINE 30 MG/ML
INJECTION, SOLUTION INTRAMUSCULAR; INTRAVENOUS PRN
Status: DISCONTINUED | OUTPATIENT
Start: 2024-06-05 | End: 2024-06-05

## 2024-06-05 RX ORDER — SODIUM CHLORIDE, SODIUM LACTATE, POTASSIUM CHLORIDE, CALCIUM CHLORIDE 600; 310; 30; 20 MG/100ML; MG/100ML; MG/100ML; MG/100ML
INJECTION, SOLUTION INTRAVENOUS CONTINUOUS
Status: DISCONTINUED | OUTPATIENT
Start: 2024-06-05 | End: 2024-06-05 | Stop reason: HOSPADM

## 2024-06-05 RX ORDER — ACETAMINOPHEN 325 MG/1
975 TABLET ORAL ONCE
Status: DISCONTINUED | OUTPATIENT
Start: 2024-06-05 | End: 2024-06-05 | Stop reason: HOSPADM

## 2024-06-05 RX ADMIN — PROPOFOL 60 MG: 10 INJECTION, EMULSION INTRAVENOUS at 07:55

## 2024-06-05 RX ADMIN — KETOROLAC TROMETHAMINE 30 MG: 30 INJECTION, SOLUTION INTRAMUSCULAR at 07:59

## 2024-06-05 RX ADMIN — HYDROMORPHONE HYDROCHLORIDE 0.3 MG: 1 INJECTION, SOLUTION INTRAMUSCULAR; INTRAVENOUS; SUBCUTANEOUS at 08:07

## 2024-06-05 RX ADMIN — LIDOCAINE HYDROCHLORIDE 60 MG: 20 INJECTION, SOLUTION INFILTRATION; PERINEURAL at 07:54

## 2024-06-05 RX ADMIN — LIDOCAINE HYDROCHLORIDE 40 MG: 20 INJECTION, SOLUTION INFILTRATION; PERINEURAL at 08:03

## 2024-06-05 RX ADMIN — PROPOFOL 100 MCG/KG/MIN: 10 INJECTION, EMULSION INTRAVENOUS at 07:56

## 2024-06-05 RX ADMIN — HYDROMORPHONE HYDROCHLORIDE 0.2 MG: 1 INJECTION, SOLUTION INTRAMUSCULAR; INTRAVENOUS; SUBCUTANEOUS at 08:02

## 2024-06-05 ASSESSMENT — ACTIVITIES OF DAILY LIVING (ADL): ADLS_ACUITY_SCORE: 35

## 2024-06-05 NOTE — LETTER
6/5/2024      Michelle Mills  [address] [facility] [address]      Dear Colleague,    Thank you for referring your patient, Michelle Mills, to the [facility] RADIATION ONCOLOGY. Please see a copy of my visit note below.    Michelle Mills is here for scheduled HDR brachytherapy    HDR Tandem and Ring   2    Pre-procedure-  Time out done by Megan Valles RN and Dr Greenberg.   Patient identified, arm band applied, signed consent available   Medications taken by patient prior to procedure See OR notes  Pain assessment: 0/10      Post-procedure-  Pain assessment: 0/10   Device removed without difficulty, Education for possible side effects and management, Discharge teaching reviewed, questions answered, and Discharged to home         Again, thank you for allowing me to participate in the care of your patient.        Sincerely,        Lucy Greenberg MD

## 2024-06-05 NOTE — ANESTHESIA CARE TRANSFER NOTE
Patient: Michelle Mills    Procedure: Procedure(s):  ANESTHESIA OUT OF OR RAD THERAPY Tandem and Ring Procedure @0730       Diagnosis: Malignant neoplasm of overlapping sites of cervix (H) [C53.8]  Diagnosis Additional Information: No value filed.    Anesthesia Type:   MAC     Note:    Oropharynx: oropharynx clear of all foreign objects and spontaneously breathing  Level of Consciousness: awake  Oxygen Supplementation: face mask  Level of Supplemental Oxygen (L/min / FiO2): 6  Independent Airway: airway patency satisfactory and stable  Dentition: dentition unchanged  Vital Signs Stable: post-procedure vital signs reviewed and stable  Report to RN Given: handoff report given  Destination: To MRI.  Comments: Vitals stable, OR team assumed care and transferred to imaging        Vitals:  Vitals Value Taken Time   BP     Temp     Pulse     Resp     SpO2         Electronically Signed By: Ryan Rios MD  June 5, 2024  8:16 AM   Patient was seen in December, no notes to advise when to come back. When is patient due?

## 2024-06-05 NOTE — OP NOTE
PREOPERATIVE DIAGNOSIS: Invasive squamous cell carcinoma of cervix, FIGO IB3  POSTOPERATIVE DIAGNOSIS: Invasive squamous cell carcinoma of cervix, FIGO IB3     PROCEDURE PERFORMED: Placement of tandem and ring apparatus for HDR brachytherapy, 2 of 5      SURGEON: Lucy Greenberg MD Radiation Oncology     ANESTHESIA: Conscious sedation       COMPLICATIONS: None      ESTIMATED BLOOD LOSS: None      INTRAVENOUS FLUIDS: Per anesthesia record       OPERATIVE FINDINGS: Normal external genitalia. Kevan sleeve in place.        DESCRIPTION OF OPERATIVE PROCEDURE:  Ms. Mills was brought back to the operating room and identified to be herself. A time-out was performed and the patient was given conscious sedation. She was placed in the dorsal lithotomy position. Using sterile technique, a garcia catheter was inserted into the bladder and 5 cc of saline was used to inflate the balloon. Speculum examination was performed with cervical os and Kevan sleeve easily identified. A 60 mm, 60 degree tandem applicator was placed into the Kevan sleeve and secured in the proper fashion. A 3.4 cm ring applicator was placed and secured. The implant secured with radiation panties and MRI compatible clamps.  Anesthesia was then reversed and the patient was brought to MRI.       Lucy Greenberg MD  Radiation Oncology

## 2024-06-05 NOTE — ANESTHESIA POSTPROCEDURE EVALUATION
Patient: Michelle Mills    Procedure: Procedure(s):  ANESTHESIA OUT OF OR RAD THERAPY Tandem and Ring Procedure @5432       Anesthesia Type:  MAC    Note:  Disposition: Outpatient   Postop Pain Control: Uneventful            Sign Out: Well controlled pain   PONV:    Neuro/Psych: Uneventful            Sign Out: Acceptable/Baseline neuro status   Airway/Respiratory: Uneventful            Sign Out: Acceptable/Baseline resp. status   CV/Hemodynamics: Uneventful            Sign Out: Acceptable CV status; No obvious hypovolemia; No obvious fluid overload   Other NRE: NONE   DID A NON-ROUTINE EVENT OCCUR?            Last vitals:  Vitals:    06/05/24 0738   BP: 138/87   Resp: 16   Temp: 36.8  C (98.3  F)   SpO2: 100%       Electronically Signed By: Maryse Mukherjee MD  June 5, 2024  10:06 AM

## 2024-06-05 NOTE — PROGRESS NOTES
Michelle THOM Mills is here for scheduled HDR brachytherapy    HDR Tandem and Ring   2    Pre-procedure-  Time out done by Megan Valles RN and Dr Greenberg.   Patient identified, arm band applied, signed consent available   Medications taken by patient prior to procedure See OR notes  Pain assessment: 0/10      Post-procedure-  Pain assessment: 0/10   Device removed without difficulty, Education for possible side effects and management, Discharge teaching reviewed, questions answered, and Discharged to home

## 2024-06-06 ENCOUNTER — ANESTHESIA EVENT (OUTPATIENT)
Dept: GASTROENTEROLOGY | Facility: CLINIC | Age: 54
End: 2024-06-06
Payer: COMMERCIAL

## 2024-06-06 ASSESSMENT — LIFESTYLE VARIABLES: TOBACCO_USE: 1

## 2024-06-06 ASSESSMENT — ENCOUNTER SYMPTOMS: SEIZURES: 0

## 2024-06-06 NOTE — ANESTHESIA PREPROCEDURE EVALUATION
Anesthesia Pre-Procedure Evaluation    Patient: Michelle Mills   MRN: [id number] : 1970        Procedure : Procedure(s):  ANESTHESIA OUT OF OR RAD THERAPY Tandem and Ring Procedure @0730          Past Medical History:   Diagnosis Date    Anemia     HTN (hypertension)     Hypomagnesemia     Malignant neoplasm of overlapping sites of cervix (H)     Thrombocytopenia (H24)     Tobacco abuse       Past Surgical History:   Procedure Laterality Date    EXAM UNDER ANESTHESIA, INSERT KENDY SLEEVE, UTERINE PLACEMENT OF TANDEM AND RING FOR RAD, ULTRASOUND N/A 2024    Procedure: EXAM UNDER ANESTHESIA, GYNECOLOGIC, WITH INSERTION OF KENDY SLEEVE, WITH ULTRASOUND GUIDANCE;  Surgeon: Dede Henry MD;  Location: [facility] OR      Allergies   Allergen Reactions    Codeine      Other Reaction(s): Unknown      Social History     Tobacco Use    Smoking status: Every Day     Types: Cigarettes     Passive exposure: Current    Smokeless tobacco: Never   Substance Use Topics    Alcohol use: Yes     Comment: Occ      Wt Readings from Last 1 Encounters:   24 89.8 kg (198 lb)        Anesthesia Evaluation   Pt has had prior anesthetic.     No history of anesthetic complications       ROS/MED HX  ENT/Pulmonary:     (+)     ANTONIA risk factors, snores loudly, hypertension, obese,        tobacco use, Current use,                       Neurologic:  - neg neurologic ROS  (-) no seizures, no CVA and no TIA   Cardiovascular:     (+)  hypertension- -   -  - -                                      METS/Exercise Tolerance: >4 METS    Hematologic: Comments: # Thrombocytopenia     (+)      anemia,          Musculoskeletal:       GI/Hepatic: Comment: Pain with defecation     (+) GERD (food related), Asymptomatic on medication,                  Renal/Genitourinary: Comment: Dysuria       Endo: Comment: # Hypomagnesemia   # hyponatremia  # hypochloremia    (+)            Chronic steroid usage for  Date most recently used: decadron with  "chemotherapy. Obesity,       Psychiatric/Substance Use:  - neg psychiatric ROS     Infectious Disease:  - neg infectious disease ROS     Malignancy:   (+) Malignancy, History of Other.Other CA cervical cancer status post Chemo and Radiation.    Other:  - neg other ROS    (+)  , H/O Chronic Pain,         Physical Exam    Airway        Mallampati: III   TM distance: > 3 FB   Neck ROM: full   Mouth opening: > 3 cm    Respiratory Devices and Support         Dental       (+) Modest Abnormalities - crowns, retainers, 1 or 2 missing teeth      Cardiovascular          Rhythm and rate: regular and normal     Pulmonary           breath sounds clear to auscultation           OUTSIDE LABS:  CBC:   Lab Results   Component Value Date    WBC 2.4 (L) 05/28/2024    WBC 3.4 (L) 05/20/2024    HGB 9.7 (L) 05/28/2024    HGB 10.1 (L) 05/20/2024    HCT 28.8 (L) 05/28/2024    HCT 30.6 (L) 05/20/2024     (L) 05/28/2024     (L) 05/20/2024     BMP:   Lab Results   Component Value Date     (L) 05/28/2024     05/20/2024    POTASSIUM 3.9 05/28/2024    POTASSIUM 4.0 05/20/2024    CHLORIDE 92 (L) 05/28/2024    CHLORIDE 100 05/20/2024    CO2 24 05/28/2024    CO2 26 05/20/2024    BUN 15.8 05/28/2024    BUN 9.9 05/20/2024    CR 1.03 (H) 05/28/2024    CR 0.93 05/20/2024     (H) 05/28/2024     (H) 05/20/2024     COAGS: No results found for: \"PTT\", \"INR\", \"FIBR\"  POC: No results found for: \"BGM\", \"HCG\", \"HCGS\"  HEPATIC:   Lab Results   Component Value Date    ALBUMIN 3.8 05/28/2024    PROTTOTAL 7.2 05/28/2024    ALT 17 05/28/2024    AST 27 05/28/2024    ALKPHOS 105 05/28/2024    BILITOTAL 0.5 05/28/2024     OTHER:   Lab Results   Component Value Date    IVON 9.1 05/28/2024    MAG 1.2 (L) 05/20/2024    LIPASE 13 05/28/2024       Anesthesia Plan    ASA Status:  3    NPO Status:  NPO Appropriate    Anesthesia Type: MAC.     - Reason for MAC: straight local not clinically adequate      Maintenance: TIVA.    "     Consents    Anesthesia Plan(s) and associated risks, benefits, and realistic alternatives discussed. Questions answered and patient/representative(s) expressed understanding.     - Discussed:     - Discussed with:  Patient            Postoperative Care    Pain management: IV analgesics, Oral pain medications, Multi-modal analgesia.   PONV prophylaxis: Background Propofol Infusion, Ondansetron (or other 5HT-3)     Comments:               Ryan Rios MD    I have reviewed the pertinent notes and labs in the chart from the past 30 days and (re)examined the patient.  Any updates or changes from those notes are reflected in this note.     # Hyponatremia: Lowest Na = 130 mmol/L in last 30 days, will monitor as appropriate    # Hypomagnesemia: Lowest Mg = 1.2 mg/dL in last 30 days, will replace as needed      # Thrombocytopenia: Lowest platelets = 116 in last 30 days, will monitor for bleeding  # Obesity: Estimated body mass index is 38.67 kg/m  as calculated from the following:    Height as of 5/21/24: 1.524 m (5').    Weight as of 5/28/24: 89.8 kg (198 lb).

## 2024-06-07 ENCOUNTER — HOSPITAL ENCOUNTER (OUTPATIENT)
Facility: CLINIC | Age: 54
Discharge: HOME | End: 2024-06-07
Attending: INTERNAL MEDICINE | Admitting: RADIOLOGY
Payer: COMMERCIAL

## 2024-06-07 ENCOUNTER — HOSPITAL ENCOUNTER (OUTPATIENT)
Dept: MRI IMAGING | Facility: CLINIC | Age: 54
Discharge: HOME | End: 2024-06-07
Attending: RADIOLOGY | Admitting: INTERNAL MEDICINE
Payer: COMMERCIAL

## 2024-06-07 ENCOUNTER — ANESTHESIA (OUTPATIENT)
Dept: GASTROENTEROLOGY | Facility: CLINIC | Age: 54
End: 2024-06-07
Payer: COMMERCIAL

## 2024-06-07 ENCOUNTER — OFFICE VISIT (OUTPATIENT)
Dept: RADIATION ONCOLOGY | Facility: CLINIC | Age: 54
End: 2024-06-07
Attending: RADIOLOGY
Payer: COMMERCIAL

## 2024-06-07 VITALS
RESPIRATION RATE: 16 BRPM | DIASTOLIC BLOOD PRESSURE: 94 MMHG | SYSTOLIC BLOOD PRESSURE: 126 MMHG | TEMPERATURE: 98 F | OXYGEN SATURATION: 100 % | HEART RATE: 95 BPM

## 2024-06-07 DIAGNOSIS — C53.1 MALIGNANT NEOPLASM OF EXOCERVIX (H): ICD-10-CM

## 2024-06-07 DIAGNOSIS — C53.1 MALIGNANT NEOPLASM OF EXOCERVIX (H): Primary | ICD-10-CM

## 2024-06-07 PROCEDURE — 77771 HDR RDNCL NTRSTL/ICAV BRCHTX: CPT | Mod: 26 | Performed by: RADIOLOGY

## 2024-06-07 PROCEDURE — 370N000017 HC ANESTHESIA TECHNICAL FEE, PER MIN

## 2024-06-07 PROCEDURE — 77771 HDR RDNCL NTRSTL/ICAV BRCHTX: CPT | Performed by: RADIOLOGY

## 2024-06-07 PROCEDURE — 77290 THER RAD SIMULAJ FIELD CPLX: CPT | Performed by: RADIOLOGY

## 2024-06-07 PROCEDURE — 271N000005 HC IMPLANT RADIATION BRIEF: Performed by: RADIOLOGY

## 2024-06-07 PROCEDURE — 57155 INSERT UTERI TANDEM/OVOIDS: CPT | Performed by: RADIOLOGY

## 2024-06-07 PROCEDURE — 57155 INSERT UTERI TANDEM/OVOIDS: CPT | Performed by: ANESTHESIOLOGY

## 2024-06-07 PROCEDURE — 76498 UNLISTED MR PROCEDURE: CPT | Mod: TC

## 2024-06-07 PROCEDURE — 258N000003 HC RX IP 258 OP 636: Mod: JZ | Performed by: STUDENT IN AN ORGANIZED HEALTH CARE EDUCATION/TRAINING PROGRAM

## 2024-06-07 PROCEDURE — 250N000009 HC RX 250: Performed by: STUDENT IN AN ORGANIZED HEALTH CARE EDUCATION/TRAINING PROGRAM

## 2024-06-07 PROCEDURE — 250N000011 HC RX IP 250 OP 636: Performed by: STUDENT IN AN ORGANIZED HEALTH CARE EDUCATION/TRAINING PROGRAM

## 2024-06-07 PROCEDURE — 77290 THER RAD SIMULAJ FIELD CPLX: CPT | Mod: 26 | Performed by: RADIOLOGY

## 2024-06-07 PROCEDURE — C1717 BRACHYTX, NON-STR,HDR IR-192: HCPCS | Performed by: RADIOLOGY

## 2024-06-07 RX ORDER — LOPERAMIDE HCL 2 MG
2 CAPSULE ORAL 4 TIMES DAILY PRN
COMMUNITY

## 2024-06-07 RX ORDER — PROPOFOL 10 MG/ML
INJECTION, EMULSION INTRAVENOUS PRN
Status: DISCONTINUED | OUTPATIENT
Start: 2024-06-07 | End: 2024-06-07

## 2024-06-07 RX ORDER — ACETAMINOPHEN 325 MG/1
975 TABLET ORAL ONCE
Status: DISCONTINUED | OUTPATIENT
Start: 2024-06-07 | End: 2024-06-07 | Stop reason: HOSPADM

## 2024-06-07 RX ORDER — SODIUM CHLORIDE, SODIUM LACTATE, POTASSIUM CHLORIDE, CALCIUM CHLORIDE 600; 310; 30; 20 MG/100ML; MG/100ML; MG/100ML; MG/100ML
INJECTION, SOLUTION INTRAVENOUS CONTINUOUS
Status: DISCONTINUED | OUTPATIENT
Start: 2024-06-07 | End: 2024-06-07 | Stop reason: HOSPADM

## 2024-06-07 RX ORDER — PROPOFOL 10 MG/ML
INJECTION, EMULSION INTRAVENOUS CONTINUOUS PRN
Status: DISCONTINUED | OUTPATIENT
Start: 2024-06-07 | End: 2024-06-07

## 2024-06-07 RX ORDER — KETOROLAC TROMETHAMINE 30 MG/ML
INJECTION, SOLUTION INTRAMUSCULAR; INTRAVENOUS PRN
Status: DISCONTINUED | OUTPATIENT
Start: 2024-06-07 | End: 2024-06-07

## 2024-06-07 RX ORDER — SODIUM CHLORIDE 9 MG/ML
INJECTION, SOLUTION INTRAVENOUS CONTINUOUS PRN
Status: DISCONTINUED | OUTPATIENT
Start: 2024-06-07 | End: 2024-06-07

## 2024-06-07 RX ORDER — LIDOCAINE HYDROCHLORIDE 20 MG/ML
INJECTION, SOLUTION INFILTRATION; PERINEURAL PRN
Status: DISCONTINUED | OUTPATIENT
Start: 2024-06-07 | End: 2024-06-07

## 2024-06-07 RX ORDER — LIDOCAINE 40 MG/G
CREAM TOPICAL
Status: DISCONTINUED | OUTPATIENT
Start: 2024-06-07 | End: 2024-06-07 | Stop reason: HOSPADM

## 2024-06-07 RX ADMIN — KETOROLAC TROMETHAMINE 15 MG: 30 INJECTION, SOLUTION INTRAMUSCULAR at 07:41

## 2024-06-07 RX ADMIN — SODIUM CHLORIDE: 0.9 INJECTION, SOLUTION INTRAVENOUS at 07:31

## 2024-06-07 RX ADMIN — PROPOFOL 50 MG: 10 INJECTION, EMULSION INTRAVENOUS at 07:35

## 2024-06-07 RX ADMIN — LIDOCAINE HYDROCHLORIDE 60 MG: 20 INJECTION, SOLUTION INFILTRATION; PERINEURAL at 07:34

## 2024-06-07 RX ADMIN — PROPOFOL 150 MCG/KG/MIN: 10 INJECTION, EMULSION INTRAVENOUS at 07:35

## 2024-06-07 RX ADMIN — HYDROMORPHONE HYDROCHLORIDE 0.25 MG: 1 INJECTION, SOLUTION INTRAMUSCULAR; INTRAVENOUS; SUBCUTANEOUS at 07:48

## 2024-06-07 RX ADMIN — HYDROMORPHONE HYDROCHLORIDE 0.25 MG: 1 INJECTION, SOLUTION INTRAMUSCULAR; INTRAVENOUS; SUBCUTANEOUS at 07:44

## 2024-06-07 ASSESSMENT — ACTIVITIES OF DAILY LIVING (ADL)
ADLS_ACUITY_SCORE: 35

## 2024-06-07 NOTE — ANESTHESIA CARE TRANSFER NOTE
Patient: Michelle Mills    Procedure: Procedure(s):  ANESTHESIA OUT OF OR RAD THERAPY Tandem and Ring Procedure @0730       Diagnosis: Cervical cancer (H) [C53.9]  Diagnosis Additional Information: No value filed.    Anesthesia Type:   MAC     Note:    Oropharynx: oropharynx clear of all foreign objects and spontaneously breathing  Level of Consciousness: awake  Oxygen Supplementation: nasal cannula  Level of Supplemental Oxygen (L/min / FiO2): 2  Independent Airway: airway patency satisfactory and stable      Report to RN Given: handoff report given  Destination: Primary team transporting to MRI.        Vitals:  Vitals Value Taken Time   /80    Temp     Pulse 99    Resp 16    SpO2 100        Electronically Signed By: Ryan Rios MD  June 7, 2024  7:56 AM

## 2024-06-07 NOTE — ANESTHESIA POSTPROCEDURE EVALUATION
Patient: Michelle Mills    Procedure: Procedure(s):  ANESTHESIA OUT OF OR RAD THERAPY Tandem and Ring Procedure @0926       Anesthesia Type:  MAC    Note:  Disposition: Outpatient   Postop Pain Control: Uneventful            Sign Out: Well controlled pain   PONV: No   Neuro/Psych: Uneventful            Sign Out: Acceptable/Baseline neuro status   Airway/Respiratory: Uneventful            Sign Out: Acceptable/Baseline resp. status   CV/Hemodynamics: Uneventful            Sign Out: Acceptable CV status; No obvious hypovolemia; No obvious fluid overload   Other NRE: NONE   DID A NON-ROUTINE EVENT OCCUR? No           Last vitals:  Vitals:    06/07/24 0708   BP: (!) 126/94   Pulse: 95   Resp: 16   Temp: 36.7  C (98  F)   SpO2: 100%       Electronically Signed By: HECTOR GONZALEZ MD  June 7, 2024  9:36 AM

## 2024-06-07 NOTE — LETTER
6/7/2024      Michelle Mills  [address]  [facility] [address]      Dear Colleague,    Thank you for referring your patient, Michelle Mills, to the [facility] RADIATION ONCOLOGY. Please see a copy of my visit note below.    Michelle Mills is here for scheduled HDR brachytherapy    HDR Tandem and Ring   3    Pre-procedure-  Time out done by Megan Valles RN and Dr Henry.   Patient identified, arm band applied, signed consent available   Medications taken by patient prior to procedure See OR notes  Pain assessment: 0/10      Post-procedure-  Pain assessment: 0/10   Device removed without difficulty, Education for possible side effects and management, Discharge teaching reviewed, questions answered, and Discharged to home       A radiation therapy treatment planning simulation was performed.  HDR brachytherapy tandem and ring 3 of 5  Please see the [facility] record for documentation.    Dede Henry MD  Radiation Oncology       Again, thank you for allowing me to participate in the care of your patient.        Sincerely,        Dede Henry MD

## 2024-06-07 NOTE — OP NOTE
PREOPERATIVE DIAGNOSIS: Invasive squamous cell carcinoma of cervix, FIGO IB3    POSTOPERATIVE DIAGNOSIS: Invasive squamous cell carcinoma of cervix, FIGO IB3     PROCEDURE PERFORMED: Placement of tandem and ring apparatus for HDR brachytherapy, 3 of 5      SURGEON: Dede Henry MD Radiation Oncology    ASSISTANT: Linda Espino MD PGY4 Radiation Oncology     ANESTHESIA: Conscious sedation       COMPLICATIONS: None      ESTIMATED BLOOD LOSS: None      INTRAVENOUS FLUIDS: Per anesthesia record       OPERATIVE FINDINGS: Normal external genitalia. Kevan sleeve in place.        DESCRIPTION OF OPERATIVE PROCEDURE:  Ms. Mills was brought back to the operating room and identified to be herself. A time-out was performed and the patient was given conscious sedation. She was placed in the dorsal lithotomy position. Using sterile technique, a garcia catheter was inserted into the bladder and 5 cc of saline was used to inflate the balloon. Speculum examination was performed with cervical os and Kevan sleeve easily identified. A 60 mm, 60 degree tandem applicator was placed into the Kevan sleeve and secured in the proper fashion. A 3.4 cm ring applicator was placed and secured. The implant secured with radiation panties and MRI compatible clamps.  Anesthesia was then reversed and the patient was brought to MRI.     Dede Henry MD  Radiation Oncology

## 2024-06-07 NOTE — PROGRESS NOTES
Michelle THOM Mills is here for scheduled HDR brachytherapy    HDR Tandem and Ring   3    Pre-procedure-  Time out done by Megan Valles RN and Dr Henry.   Patient identified, arm band applied, signed consent available   Medications taken by patient prior to procedure See OR notes  Pain assessment: 0/10      Post-procedure-  Pain assessment: 0/10   Device removed without difficulty, Education for possible side effects and management, Discharge teaching reviewed, questions answered, and Discharged to home

## 2024-06-09 NOTE — PROGRESS NOTES
A radiation therapy treatment planning simulation was performed.  HDR brachytherapy tandem and ring 3 of 5  Please see the [facility] record for documentation.    Dede Henry MD  Radiation Oncology

## 2024-06-10 ENCOUNTER — ANESTHESIA EVENT (OUTPATIENT)
Dept: GASTROENTEROLOGY | Facility: CLINIC | Age: 54
End: 2024-06-10
Payer: COMMERCIAL

## 2024-06-10 ASSESSMENT — LIFESTYLE VARIABLES: TOBACCO_USE: 1

## 2024-06-10 ASSESSMENT — ENCOUNTER SYMPTOMS: SEIZURES: 0

## 2024-06-10 NOTE — ANESTHESIA PREPROCEDURE EVALUATION
Anesthesia Pre-Procedure Evaluation    Patient: Michelle Mills   MRN: [id number] : 1970        Procedure : Procedure(s):  ANESTHESIA OUT OF OR RAD THERAPY Tandem and Ring Procedure @1230          Past Medical History:   Diagnosis Date     Anemia      HTN (hypertension)      Hypomagnesemia      Malignant neoplasm of overlapping sites of cervix (H)      Thrombocytopenia (H24)      Tobacco abuse       Past Surgical History:   Procedure Laterality Date     EXAM UNDER ANESTHESIA, INSERT KENDY SLEEVE, UTERINE PLACEMENT OF TANDEM AND RING FOR RAD, ULTRASOUND N/A 2024    Procedure: EXAM UNDER ANESTHESIA, GYNECOLOGIC, WITH INSERTION OF KENDY SLEEVE, WITH ULTRASOUND GUIDANCE;  Surgeon: Dede Henry MD;  Location: [facility] OR      Allergies   Allergen Reactions     Codeine      Other Reaction(s): Unknown      Social History     Tobacco Use     Smoking status: Every Day     Types: Cigarettes     Passive exposure: Current     Smokeless tobacco: Never   Substance Use Topics     Alcohol use: Yes     Comment: Occ      Wt Readings from Last 1 Encounters:   24 89.8 kg (198 lb)        Anesthesia Evaluation   Pt has had prior anesthetic. Type: General.    No history of anesthetic complications       ROS/MED HX  ENT/Pulmonary:     (+)     ANTONIA risk factors, snores loudly, hypertension, obese,        tobacco use, Current use,                       Neurologic:  - neg neurologic ROS  (-) no seizures, no CVA and no TIA   Cardiovascular:     (+)  hypertension- -   -  - -                                      METS/Exercise Tolerance: >4 METS    Hematologic: Comments: # Thrombocytopenia     (+)      anemia,          Musculoskeletal:       GI/Hepatic: Comment: Pain with defecation     (+) GERD (food related), Asymptomatic on medication,                  Renal/Genitourinary: Comment: Dysuria       Endo: Comment: # Hypomagnesemia   # hyponatremia  # hypochloremia    (+)            Chronic steroid usage for  Date most recently  "used: decadron with chemotherapy. Obesity,       Psychiatric/Substance Use:  - neg psychiatric ROS     Infectious Disease:  - neg infectious disease ROS     Malignancy:   (+) Malignancy, History of Other.Other CA cervical cancer status post Chemo and Radiation.    Other:  - neg other ROS    (+)  , H/O Chronic Pain,         Physical Exam    Airway        Mallampati: II   TM distance: > 3 FB   Neck ROM: full   Mouth opening: > 3 cm    Respiratory Devices and Support         Dental       (+) Modest Abnormalities - crowns, retainers, 1 or 2 missing teeth      Cardiovascular          Rhythm and rate: regular and normal     Pulmonary   pulmonary exam normal        breath sounds clear to auscultation         OUTSIDE LABS:  CBC:   Lab Results   Component Value Date    WBC 2.4 (L) 05/28/2024    WBC 3.4 (L) 05/20/2024    HGB 9.7 (L) 05/28/2024    HGB 10.1 (L) 05/20/2024    HCT 28.8 (L) 05/28/2024    HCT 30.6 (L) 05/20/2024     (L) 05/28/2024     (L) 05/20/2024     BMP:   Lab Results   Component Value Date     (L) 05/28/2024     05/20/2024    POTASSIUM 3.9 05/28/2024    POTASSIUM 4.0 05/20/2024    CHLORIDE 92 (L) 05/28/2024    CHLORIDE 100 05/20/2024    CO2 24 05/28/2024    CO2 26 05/20/2024    BUN 15.8 05/28/2024    BUN 9.9 05/20/2024    CR 1.03 (H) 05/28/2024    CR 0.93 05/20/2024     (H) 05/28/2024     (H) 05/20/2024     COAGS: No results found for: \"PTT\", \"INR\", \"FIBR\"  POC: No results found for: \"BGM\", \"HCG\", \"HCGS\"  HEPATIC:   Lab Results   Component Value Date    ALBUMIN 3.8 05/28/2024    PROTTOTAL 7.2 05/28/2024    ALT 17 05/28/2024    AST 27 05/28/2024    ALKPHOS 105 05/28/2024    BILITOTAL 0.5 05/28/2024     OTHER:   Lab Results   Component Value Date    IVON 9.1 05/28/2024    MAG 1.2 (L) 05/20/2024    LIPASE 13 05/28/2024       Anesthesia Plan    ASA Status:  3    NPO Status:  NPO Appropriate    Anesthesia Type: MAC.     - Reason for MAC: straight local not clinically adequate "      Maintenance: TIVA.        Consents    Anesthesia Plan(s) and associated risks, benefits, and realistic alternatives discussed. Questions answered and patient/representative(s) expressed understanding.     - Discussed: Risks, Benefits and Alternatives for BOTH SEDATION and the PROCEDURE were discussed     - Discussed with:  Patient       Use of blood products discussed: Yes.     - Discussed with: Patient.     Postoperative Care    Pain management: IV analgesics, Oral pain medications, Multi-modal analgesia.   PONV prophylaxis: Background Propofol Infusion, Ondansetron (or other 5HT-3)     Comments:               Ryan Rios MD    I have reviewed the pertinent notes and labs in the chart from the past 30 days and (re)examined the patient.  Any updates or changes from those notes are reflected in this note.     # Hyponatremia: Lowest Na = 130 mmol/L in last 30 days, will monitor as appropriate    # Hypomagnesemia: Lowest Mg = 1.2 mg/dL in last 30 days, will replace as needed      # Thrombocytopenia: Lowest platelets = 116 in last 30 days, will monitor for bleeding  # Obesity: Estimated body mass index is 38.67 kg/m  as calculated from the following:    Height as of 5/21/24: 1.524 m (5').    Weight as of 5/28/24: 89.8 kg (198 lb).

## 2024-06-10 NOTE — ANESTHESIA POSTPROCEDURE EVALUATION
Patient: Michelle Mills    Procedure: Procedure(s):  ANESTHESIA OUT OF OR RAD THERAPY Tandem and Ring Procedure @7759       Anesthesia Type:  MAC    Note:  Disposition: Outpatient   Postop Pain Control: Uneventful            Sign Out: Well controlled pain   PONV: No   Neuro/Psych: Uneventful            Sign Out: Acceptable/Baseline neuro status   Airway/Respiratory: Uneventful            Sign Out: Acceptable/Baseline resp. status   CV/Hemodynamics: Uneventful            Sign Out: Acceptable CV status; No obvious hypovolemia; No obvious fluid overload   Other NRE:    DID A NON-ROUTINE EVENT OCCUR?            Last vitals:  Vitals:    06/03/24 0724   BP: 123/89   Temp: 37.1  C (98.8  F)   SpO2: 100%       Electronically Signed By: Zia Vargas MD  Ashley 10, 2024  1:21 PM

## 2024-06-11 ENCOUNTER — OFFICE VISIT (OUTPATIENT)
Dept: RADIATION ONCOLOGY | Facility: CLINIC | Age: 54
End: 2024-06-11
Attending: RADIOLOGY
Payer: COMMERCIAL

## 2024-06-11 ENCOUNTER — HOSPITAL ENCOUNTER (OUTPATIENT)
Facility: CLINIC | Age: 54
Discharge: ANOTHER HOSPITAL | End: 2024-06-11
Attending: INTERNAL MEDICINE | Admitting: RADIOLOGY
Payer: COMMERCIAL

## 2024-06-11 ENCOUNTER — HOSPITAL ENCOUNTER (OUTPATIENT)
Dept: MRI IMAGING | Facility: CLINIC | Age: 54
Discharge: HOME | End: 2024-06-11
Attending: RADIOLOGY | Admitting: INTERNAL MEDICINE
Payer: COMMERCIAL

## 2024-06-11 ENCOUNTER — ANESTHESIA (OUTPATIENT)
Dept: GASTROENTEROLOGY | Facility: CLINIC | Age: 54
End: 2024-06-11
Payer: COMMERCIAL

## 2024-06-11 VITALS
DIASTOLIC BLOOD PRESSURE: 86 MMHG | TEMPERATURE: 98.5 F | HEIGHT: 60 IN | HEART RATE: 100 BPM | SYSTOLIC BLOOD PRESSURE: 134 MMHG | RESPIRATION RATE: 16 BRPM | OXYGEN SATURATION: 100 % | WEIGHT: 195 LBS | BODY MASS INDEX: 38.28 KG/M2

## 2024-06-11 DIAGNOSIS — C53.1 MALIGNANT NEOPLASM OF EXOCERVIX (H): Primary | ICD-10-CM

## 2024-06-11 DIAGNOSIS — C53.1 MALIGNANT NEOPLASM OF EXOCERVIX (H): ICD-10-CM

## 2024-06-11 PROCEDURE — 77290 THER RAD SIMULAJ FIELD CPLX: CPT | Performed by: RADIOLOGY

## 2024-06-11 PROCEDURE — 76498 UNLISTED MR PROCEDURE: CPT | Mod: TC

## 2024-06-11 PROCEDURE — 271N000005 HC IMPLANT RADIATION BRIEF: Performed by: RADIOLOGY

## 2024-06-11 PROCEDURE — 57155 INSERT UTERI TANDEM/OVOIDS: CPT | Performed by: ANESTHESIOLOGY

## 2024-06-11 PROCEDURE — 77771 HDR RDNCL NTRSTL/ICAV BRCHTX: CPT | Mod: 26 | Performed by: RADIOLOGY

## 2024-06-11 PROCEDURE — 370N000017 HC ANESTHESIA TECHNICAL FEE, PER MIN

## 2024-06-11 PROCEDURE — 57155 INSERT UTERI TANDEM/OVOIDS: CPT | Performed by: NURSE ANESTHETIST, CERTIFIED REGISTERED

## 2024-06-11 PROCEDURE — 250N000011 HC RX IP 250 OP 636: Mod: JZ | Performed by: NURSE ANESTHETIST, CERTIFIED REGISTERED

## 2024-06-11 PROCEDURE — 77290 THER RAD SIMULAJ FIELD CPLX: CPT | Mod: 26 | Performed by: RADIOLOGY

## 2024-06-11 PROCEDURE — 57155 INSERT UTERI TANDEM/OVOIDS: CPT | Performed by: RADIOLOGY

## 2024-06-11 PROCEDURE — 250N000009 HC RX 250: Performed by: NURSE ANESTHETIST, CERTIFIED REGISTERED

## 2024-06-11 PROCEDURE — 258N000003 HC RX IP 258 OP 636: Mod: JZ | Performed by: NURSE ANESTHETIST, CERTIFIED REGISTERED

## 2024-06-11 PROCEDURE — C1717 BRACHYTX, NON-STR,HDR IR-192: HCPCS | Performed by: RADIOLOGY

## 2024-06-11 PROCEDURE — 77771 HDR RDNCL NTRSTL/ICAV BRCHTX: CPT | Performed by: RADIOLOGY

## 2024-06-11 RX ORDER — DEXAMETHASONE SODIUM PHOSPHATE 4 MG/ML
4 INJECTION, SOLUTION INTRA-ARTICULAR; INTRALESIONAL; INTRAMUSCULAR; INTRAVENOUS; SOFT TISSUE
Status: CANCELLED | OUTPATIENT
Start: 2024-06-11

## 2024-06-11 RX ORDER — ONDANSETRON 2 MG/ML
4 INJECTION INTRAMUSCULAR; INTRAVENOUS EVERY 30 MIN PRN
Status: CANCELLED | OUTPATIENT
Start: 2024-06-11

## 2024-06-11 RX ORDER — OXYCODONE HYDROCHLORIDE 5 MG/1
5 TABLET ORAL
Status: CANCELLED | OUTPATIENT
Start: 2024-06-11

## 2024-06-11 RX ORDER — FENTANYL CITRATE 50 UG/ML
25 INJECTION, SOLUTION INTRAMUSCULAR; INTRAVENOUS EVERY 5 MIN PRN
Status: CANCELLED | OUTPATIENT
Start: 2024-06-11

## 2024-06-11 RX ORDER — HYDROMORPHONE HCL IN WATER/PF 6 MG/30 ML
0.2 PATIENT CONTROLLED ANALGESIA SYRINGE INTRAVENOUS EVERY 5 MIN PRN
Status: CANCELLED | OUTPATIENT
Start: 2024-06-11

## 2024-06-11 RX ORDER — ONDANSETRON 4 MG/1
4 TABLET, ORALLY DISINTEGRATING ORAL EVERY 30 MIN PRN
Status: CANCELLED | OUTPATIENT
Start: 2024-06-11

## 2024-06-11 RX ORDER — NALOXONE HYDROCHLORIDE 0.4 MG/ML
0.1 INJECTION, SOLUTION INTRAMUSCULAR; INTRAVENOUS; SUBCUTANEOUS
Status: CANCELLED | OUTPATIENT
Start: 2024-06-11

## 2024-06-11 RX ORDER — LIDOCAINE HYDROCHLORIDE 20 MG/ML
INJECTION, SOLUTION INFILTRATION; PERINEURAL PRN
Status: DISCONTINUED | OUTPATIENT
Start: 2024-06-11 | End: 2024-06-11

## 2024-06-11 RX ORDER — SODIUM CHLORIDE, SODIUM LACTATE, POTASSIUM CHLORIDE, CALCIUM CHLORIDE 600; 310; 30; 20 MG/100ML; MG/100ML; MG/100ML; MG/100ML
INJECTION, SOLUTION INTRAVENOUS CONTINUOUS
Status: CANCELLED | OUTPATIENT
Start: 2024-06-11

## 2024-06-11 RX ORDER — OXYCODONE HYDROCHLORIDE 10 MG/1
10 TABLET ORAL
Status: CANCELLED | OUTPATIENT
Start: 2024-06-11

## 2024-06-11 RX ORDER — SODIUM CHLORIDE, SODIUM LACTATE, POTASSIUM CHLORIDE, CALCIUM CHLORIDE 600; 310; 30; 20 MG/100ML; MG/100ML; MG/100ML; MG/100ML
INJECTION, SOLUTION INTRAVENOUS CONTINUOUS PRN
Status: DISCONTINUED | OUTPATIENT
Start: 2024-06-11 | End: 2024-06-11

## 2024-06-11 RX ORDER — ACETAMINOPHEN 325 MG/1
975 TABLET ORAL ONCE
Status: DISCONTINUED | OUTPATIENT
Start: 2024-06-11 | End: 2024-06-11 | Stop reason: HOSPADM

## 2024-06-11 RX ORDER — PROPOFOL 10 MG/ML
INJECTION, EMULSION INTRAVENOUS PRN
Status: DISCONTINUED | OUTPATIENT
Start: 2024-06-11 | End: 2024-06-11

## 2024-06-11 RX ORDER — KETOROLAC TROMETHAMINE 30 MG/ML
INJECTION, SOLUTION INTRAMUSCULAR; INTRAVENOUS PRN
Status: DISCONTINUED | OUTPATIENT
Start: 2024-06-11 | End: 2024-06-11

## 2024-06-11 RX ORDER — HYDROMORPHONE HCL IN WATER/PF 6 MG/30 ML
0.4 PATIENT CONTROLLED ANALGESIA SYRINGE INTRAVENOUS EVERY 5 MIN PRN
Status: CANCELLED | OUTPATIENT
Start: 2024-06-11

## 2024-06-11 RX ORDER — SODIUM CHLORIDE, SODIUM LACTATE, POTASSIUM CHLORIDE, CALCIUM CHLORIDE 600; 310; 30; 20 MG/100ML; MG/100ML; MG/100ML; MG/100ML
INJECTION, SOLUTION INTRAVENOUS CONTINUOUS
Status: DISCONTINUED | OUTPATIENT
Start: 2024-06-11 | End: 2024-06-11 | Stop reason: HOSPADM

## 2024-06-11 RX ORDER — ONDANSETRON 2 MG/ML
INJECTION INTRAMUSCULAR; INTRAVENOUS PRN
Status: DISCONTINUED | OUTPATIENT
Start: 2024-06-11 | End: 2024-06-11

## 2024-06-11 RX ORDER — LIDOCAINE 40 MG/G
CREAM TOPICAL
Status: CANCELLED | OUTPATIENT
Start: 2024-06-11

## 2024-06-11 RX ORDER — FENTANYL CITRATE 50 UG/ML
50 INJECTION, SOLUTION INTRAMUSCULAR; INTRAVENOUS EVERY 5 MIN PRN
Status: CANCELLED | OUTPATIENT
Start: 2024-06-11

## 2024-06-11 RX ORDER — PROPOFOL 10 MG/ML
INJECTION, EMULSION INTRAVENOUS CONTINUOUS PRN
Status: DISCONTINUED | OUTPATIENT
Start: 2024-06-11 | End: 2024-06-11

## 2024-06-11 RX ORDER — LIDOCAINE 40 MG/G
CREAM TOPICAL
Status: DISCONTINUED | OUTPATIENT
Start: 2024-06-11 | End: 2024-06-11 | Stop reason: HOSPADM

## 2024-06-11 RX ADMIN — SODIUM CHLORIDE, POTASSIUM CHLORIDE, SODIUM LACTATE AND CALCIUM CHLORIDE: 600; 310; 30; 20 INJECTION, SOLUTION INTRAVENOUS at 13:04

## 2024-06-11 RX ADMIN — KETOROLAC TROMETHAMINE 15 MG: 30 INJECTION, SOLUTION INTRAMUSCULAR at 13:20

## 2024-06-11 RX ADMIN — PROPOFOL 30 MG: 10 INJECTION, EMULSION INTRAVENOUS at 13:20

## 2024-06-11 RX ADMIN — LIDOCAINE HYDROCHLORIDE 80 MG: 20 INJECTION, SOLUTION INFILTRATION; PERINEURAL at 13:08

## 2024-06-11 RX ADMIN — HYDROMORPHONE HYDROCHLORIDE 0.25 MG: 1 INJECTION, SOLUTION INTRAMUSCULAR; INTRAVENOUS; SUBCUTANEOUS at 13:18

## 2024-06-11 RX ADMIN — ONDANSETRON 4 MG: 2 INJECTION INTRAMUSCULAR; INTRAVENOUS at 13:20

## 2024-06-11 RX ADMIN — PROPOFOL 150 MCG/KG/MIN: 10 INJECTION, EMULSION INTRAVENOUS at 13:10

## 2024-06-11 RX ADMIN — HYDROMORPHONE HYDROCHLORIDE 0.25 MG: 1 INJECTION, SOLUTION INTRAMUSCULAR; INTRAVENOUS; SUBCUTANEOUS at 13:22

## 2024-06-11 RX ADMIN — PROPOFOL 50 MG: 10 INJECTION, EMULSION INTRAVENOUS at 13:15

## 2024-06-11 ASSESSMENT — ACTIVITIES OF DAILY LIVING (ADL)
ADLS_ACUITY_SCORE: 33
ADLS_ACUITY_SCORE: 33
ADLS_ACUITY_SCORE: 35

## 2024-06-11 NOTE — PROGRESS NOTES
Michelle THOM Mills is here for scheduled HDR brachytherapy    HDR Tandem and Ring   4    Pre-procedure-  Time out done by Megan Valles RN and Dr Greenberg.   Patient identified, arm band applied, signed consent available   Medications taken by patient prior to procedure See OR notes  Pain assessment: 0/10      Post-procedure-  Pain assessment: 0/10   Device removed without difficulty, Education for possible side effects and management, Discharge teaching reviewed, questions answered, and Discharged to home

## 2024-06-11 NOTE — LETTER
6/11/2024      Michelle Mills  [address] [facility] [address]      Dear Colleague,    Thank you for referring your patient, Michelle Mills, to the [facility] RADIATION ONCOLOGY. Please see a copy of my visit note below.    Michelle Mills is here for scheduled HDR brachytherapy    HDR Tandem and Ring   4    Pre-procedure-  Time out done by Megan Valles RN and Dr Greenberg.   Patient identified, arm band applied, signed consent available   Medications taken by patient prior to procedure See OR notes  Pain assessment: 0/10      Post-procedure-  Pain assessment: 0/10   Device removed without difficulty, Education for possible side effects and management, Discharge teaching reviewed, questions answered, and Discharged to home         Again, thank you for allowing me to participate in the care of your patient.        Sincerely,        Lucy Greenberg MD

## 2024-06-11 NOTE — OP NOTE
PREOPERATIVE DIAGNOSIS: Invasive squamous cell carcinoma of cervix, FIGO IB3  POSTOPERATIVE DIAGNOSIS: Invasive squamous cell carcinoma of cervix, FIGO IB3     PROCEDURE PERFORMED: Placement of tandem and ring apparatus for HDR brachytherapy, 4 of 5      SURGEON: Lucy Greenberg MD Radiation Oncology     ANESTHESIA: Conscious sedation       COMPLICATIONS: None      ESTIMATED BLOOD LOSS: None      INTRAVENOUS FLUIDS: Per anesthesia record       OPERATIVE FINDINGS: Normal external genitalia. Kevan sleeve in place.        DESCRIPTION OF OPERATIVE PROCEDURE:  Ms. Mills was brought back to the operating room and identified to be herself. A time-out was performed and the patient was given conscious sedation. She was placed in the dorsal lithotomy position. Using sterile technique, a garcia catheter was inserted into the bladder and 3.3 cc of saline was used to inflate the balloon. Speculum examination was performed with cervical os and Kevan sleeve easily identified. A 60 mm, 60 degree tandem applicator was placed into the Kevan sleeve and secured in the proper fashion. A 3.4 cm ring applicator was placed and secured. The implant secured with radiation panties and MRI compatible clamps.  Anesthesia was then reversed and the patient was brought to MRI.         Lucy Greenberg MD  Radiation Oncology

## 2024-06-11 NOTE — ANESTHESIA CARE TRANSFER NOTE
Patient: Michelle Mills    Procedure: Procedure(s):  ANESTHESIA OUT OF OR RAD THERAPY Tandem and Ring Procedure @1230       Diagnosis: Cervical cancer (H) [C53.9]  Diagnosis Additional Information: No value filed.    Anesthesia Type:   MAC     Note:    Oropharynx: spontaneously breathing  Level of Consciousness: awake and drowsy  Oxygen Supplementation: room air      Dentition: dentition unchanged  Vital Signs Stable: post-procedure vital signs reviewed and stable  Report to RN Given: handoff report given  Destination: Report given to rad onc RN.          Vitals:  Vitals Value Taken Time   /68    Temp     Pulse 89    Resp     SpO2 99%        Electronically Signed By: ANDREW Shah CRNA  June 11, 2024  1:27 PM

## 2024-06-11 NOTE — ANESTHESIA POSTPROCEDURE EVALUATION
Patient: Michelle Mills    Procedure: Procedure(s):  ANESTHESIA OUT OF OR RAD THERAPY Tandem and Ring Procedure @1230       Anesthesia Type:  MAC    Note:  Disposition: Outpatient   Postop Pain Control: Uneventful            Sign Out: Well controlled pain   PONV: No   Neuro/Psych: Uneventful            Sign Out: Acceptable/Baseline neuro status   Airway/Respiratory: Uneventful            Sign Out: Acceptable/Baseline resp. status   CV/Hemodynamics: Uneventful            Sign Out: Acceptable CV status; No obvious hypovolemia; No obvious fluid overload   Other NRE: NONE   DID A NON-ROUTINE EVENT OCCUR? No       Last vitals:  Vitals:    06/11/24 1200   BP: 134/86   Pulse: 100   Resp: 16   Temp: 36.9  C (98.5  F)   SpO2: 100%       Electronically Signed By: Cristopher Sherman MD  June 11, 2024  1:42 PM

## 2024-06-14 ENCOUNTER — ANESTHESIA EVENT (OUTPATIENT)
Dept: GASTROENTEROLOGY | Facility: CLINIC | Age: 54
End: 2024-06-14
Payer: COMMERCIAL

## 2024-06-14 ENCOUNTER — ANESTHESIA (OUTPATIENT)
Dept: GASTROENTEROLOGY | Facility: CLINIC | Age: 54
End: 2024-06-14
Payer: COMMERCIAL

## 2024-06-14 ENCOUNTER — HOSPITAL ENCOUNTER (OUTPATIENT)
Dept: MRI IMAGING | Facility: CLINIC | Age: 54
Discharge: HOME | End: 2024-06-14
Attending: RADIOLOGY | Admitting: INTERNAL MEDICINE
Payer: COMMERCIAL

## 2024-06-14 ENCOUNTER — HOSPITAL ENCOUNTER (OUTPATIENT)
Facility: CLINIC | Age: 54
Discharge: ANOTHER HOSPITAL | End: 2024-06-14
Attending: INTERNAL MEDICINE | Admitting: INTERNAL MEDICINE
Payer: COMMERCIAL

## 2024-06-14 ENCOUNTER — OFFICE VISIT (OUTPATIENT)
Dept: RADIATION ONCOLOGY | Facility: CLINIC | Age: 54
End: 2024-06-14
Attending: RADIOLOGY
Payer: COMMERCIAL

## 2024-06-14 VITALS — HEART RATE: 88 BPM | SYSTOLIC BLOOD PRESSURE: 127 MMHG | DIASTOLIC BLOOD PRESSURE: 82 MMHG

## 2024-06-14 VITALS — RESPIRATION RATE: 24 BRPM | DIASTOLIC BLOOD PRESSURE: 78 MMHG | SYSTOLIC BLOOD PRESSURE: 123 MMHG | HEART RATE: 98 BPM

## 2024-06-14 DIAGNOSIS — C53.1 MALIGNANT NEOPLASM OF EXOCERVIX (H): Primary | ICD-10-CM

## 2024-06-14 DIAGNOSIS — C53.1 MALIGNANT NEOPLASM OF EXOCERVIX (H): ICD-10-CM

## 2024-06-14 PROCEDURE — 76498 UNLISTED MR PROCEDURE: CPT | Mod: TC

## 2024-06-14 PROCEDURE — 77771 HDR RDNCL NTRSTL/ICAV BRCHTX: CPT | Mod: 26 | Performed by: RADIOLOGY

## 2024-06-14 PROCEDURE — 57155 INSERT UTERI TANDEM/OVOIDS: CPT | Performed by: RADIOLOGY

## 2024-06-14 PROCEDURE — 370N000017 HC ANESTHESIA TECHNICAL FEE, PER MIN

## 2024-06-14 PROCEDURE — 57155 INSERT UTERI TANDEM/OVOIDS: CPT | Performed by: ANESTHESIOLOGY

## 2024-06-14 PROCEDURE — 250N000011 HC RX IP 250 OP 636: Mod: JZ | Performed by: NURSE ANESTHETIST, CERTIFIED REGISTERED

## 2024-06-14 PROCEDURE — C1717 BRACHYTX, NON-STR,HDR IR-192: HCPCS | Performed by: RADIOLOGY

## 2024-06-14 PROCEDURE — 77771 HDR RDNCL NTRSTL/ICAV BRCHTX: CPT | Performed by: RADIOLOGY

## 2024-06-14 PROCEDURE — 77290 THER RAD SIMULAJ FIELD CPLX: CPT | Mod: 26 | Performed by: RADIOLOGY

## 2024-06-14 PROCEDURE — 258N000003 HC RX IP 258 OP 636: Mod: JZ | Performed by: NURSE ANESTHETIST, CERTIFIED REGISTERED

## 2024-06-14 PROCEDURE — 57155 INSERT UTERI TANDEM/OVOIDS: CPT | Performed by: NURSE ANESTHETIST, CERTIFIED REGISTERED

## 2024-06-14 PROCEDURE — 77336 RADIATION PHYSICS CONSULT: CPT | Performed by: RADIOLOGY

## 2024-06-14 PROCEDURE — 77290 THER RAD SIMULAJ FIELD CPLX: CPT | Performed by: RADIOLOGY

## 2024-06-14 PROCEDURE — 271N000005 HC IMPLANT RADIATION BRIEF: Performed by: RADIOLOGY

## 2024-06-14 RX ORDER — ONDANSETRON 4 MG/1
4 TABLET, ORALLY DISINTEGRATING ORAL EVERY 30 MIN PRN
Status: CANCELLED | OUTPATIENT
Start: 2024-06-14

## 2024-06-14 RX ORDER — ONDANSETRON 2 MG/ML
INJECTION INTRAMUSCULAR; INTRAVENOUS PRN
Status: DISCONTINUED | OUTPATIENT
Start: 2024-06-14 | End: 2024-06-14

## 2024-06-14 RX ORDER — SODIUM CHLORIDE 9 MG/ML
INJECTION, SOLUTION INTRAVENOUS CONTINUOUS PRN
Status: DISCONTINUED | OUTPATIENT
Start: 2024-06-14 | End: 2024-06-14

## 2024-06-14 RX ORDER — OXYCODONE HYDROCHLORIDE 5 MG/1
5 TABLET ORAL
Status: CANCELLED | OUTPATIENT
Start: 2024-06-14

## 2024-06-14 RX ORDER — PROPOFOL 10 MG/ML
INJECTION, EMULSION INTRAVENOUS PRN
Status: DISCONTINUED | OUTPATIENT
Start: 2024-06-14 | End: 2024-06-14

## 2024-06-14 RX ORDER — NALOXONE HYDROCHLORIDE 0.4 MG/ML
0.1 INJECTION, SOLUTION INTRAMUSCULAR; INTRAVENOUS; SUBCUTANEOUS
Status: CANCELLED | OUTPATIENT
Start: 2024-06-14

## 2024-06-14 RX ORDER — OXYCODONE HYDROCHLORIDE 10 MG/1
10 TABLET ORAL
Status: CANCELLED | OUTPATIENT
Start: 2024-06-14

## 2024-06-14 RX ORDER — ONDANSETRON 2 MG/ML
4 INJECTION INTRAMUSCULAR; INTRAVENOUS EVERY 30 MIN PRN
Status: CANCELLED | OUTPATIENT
Start: 2024-06-14

## 2024-06-14 RX ORDER — DEXAMETHASONE SODIUM PHOSPHATE 4 MG/ML
4 INJECTION, SOLUTION INTRA-ARTICULAR; INTRALESIONAL; INTRAMUSCULAR; INTRAVENOUS; SOFT TISSUE
Status: CANCELLED | OUTPATIENT
Start: 2024-06-14

## 2024-06-14 RX ORDER — KETOROLAC TROMETHAMINE 30 MG/ML
INJECTION, SOLUTION INTRAMUSCULAR; INTRAVENOUS PRN
Status: DISCONTINUED | OUTPATIENT
Start: 2024-06-14 | End: 2024-06-14

## 2024-06-14 RX ORDER — PROPOFOL 10 MG/ML
INJECTION, EMULSION INTRAVENOUS CONTINUOUS PRN
Status: DISCONTINUED | OUTPATIENT
Start: 2024-06-14 | End: 2024-06-14

## 2024-06-14 RX ADMIN — PROPOFOL 150 MCG/KG/MIN: 10 INJECTION, EMULSION INTRAVENOUS at 11:32

## 2024-06-14 RX ADMIN — SODIUM CHLORIDE: 9 INJECTION, SOLUTION INTRAVENOUS at 11:30

## 2024-06-14 RX ADMIN — ONDANSETRON 4 MG: 2 INJECTION INTRAMUSCULAR; INTRAVENOUS at 11:32

## 2024-06-14 RX ADMIN — KETOROLAC TROMETHAMINE 15 MG: 30 INJECTION, SOLUTION INTRAMUSCULAR at 11:36

## 2024-06-14 RX ADMIN — PROPOFOL 50 MG: 10 INJECTION, EMULSION INTRAVENOUS at 11:32

## 2024-06-14 RX ADMIN — MIDAZOLAM 2 MG: 1 INJECTION INTRAMUSCULAR; INTRAVENOUS at 11:30

## 2024-06-14 RX ADMIN — HYDROMORPHONE HYDROCHLORIDE 0.5 MG: 1 INJECTION, SOLUTION INTRAMUSCULAR; INTRAVENOUS; SUBCUTANEOUS at 11:46

## 2024-06-14 ASSESSMENT — ENCOUNTER SYMPTOMS: SEIZURES: 0

## 2024-06-14 ASSESSMENT — ACTIVITIES OF DAILY LIVING (ADL)
ADLS_ACUITY_SCORE: 35
ADLS_ACUITY_SCORE: 35

## 2024-06-14 ASSESSMENT — LIFESTYLE VARIABLES: TOBACCO_USE: 1

## 2024-06-14 NOTE — OP NOTE
PREOPERATIVE DIAGNOSIS: Invasive squamous cell carcinoma of cervix, FIGO IB3    POSTOPERATIVE DIAGNOSIS: Invasive squamous cell carcinoma of cervix, FIGO IB3     PROCEDURE PERFORMED: Placement of tandem and ring apparatus for HDR brachytherapy, 5 of 5      SURGEON: Dede Henry MD Radiation Oncology    ANESTHESIA: Conscious sedation       COMPLICATIONS: None      ESTIMATED BLOOD LOSS: None      INTRAVENOUS FLUIDS: Per anesthesia record       OPERATIVE FINDINGS: Normal external genitalia. Kevan sleeve in place.        DESCRIPTION OF OPERATIVE PROCEDURE:  Ms. Mills was brought back to the operating room and identified to be herself. A time-out was performed and the patient was given conscious sedation. She was placed in the dorsal lithotomy position. Using sterile technique, a garcia catheter was inserted into the bladder and 5 cc of saline was used to inflate the balloon. Speculum examination was performed with cervical os and Kevan sleeve easily identified. The sutures holding the Kevan sleeve were removed. A 60 mm, 60 degree tandem applicator was placed into the Kevan sleeve and secured in the proper fashion. A 3.4 cm ring applicator was placed and secured. The implant secured with radiation panties and MRI compatible clamps.  Anesthesia was then reversed and the patient was brought to MRI.     Dede Henry MD  Radiation Oncology

## 2024-06-14 NOTE — ANESTHESIA PREPROCEDURE EVALUATION
Anesthesia Pre-Procedure Evaluation    Patient: Michelle Mills   MRN: [id number] : 1970        Procedure : Procedure(s):  ANESTHESIA OUT OF OR RAD THERAPY Tandem and Ring Procedure @1130          Past Medical History:   Diagnosis Date    Anemia     HTN (hypertension)     Hypomagnesemia     Malignant neoplasm of overlapping sites of cervix (H)     Thrombocytopenia (H24)     Tobacco abuse       Past Surgical History:   Procedure Laterality Date    EXAM UNDER ANESTHESIA, INSERT KENDY SLEEVE, UTERINE PLACEMENT OF TANDEM AND RING FOR RAD, ULTRASOUND N/A 2024    Procedure: EXAM UNDER ANESTHESIA, GYNECOLOGIC, WITH INSERTION OF KENDY SLEEVE, WITH ULTRASOUND GUIDANCE;  Surgeon: Dede Henry MD;  Location: [facility] OR      Allergies   Allergen Reactions    Codeine      Other Reaction(s): Unknown      Social History     Tobacco Use    Smoking status: Every Day     Types: Cigarettes     Passive exposure: Current    Smokeless tobacco: Never   Substance Use Topics    Alcohol use: Yes     Comment: Occ      Wt Readings from Last 1 Encounters:   24 88.5 kg (195 lb)        Anesthesia Evaluation   Pt has not had prior anesthetic         ROS/MED HX  ENT/Pulmonary:     (+)     ANTONIA risk factors, snores loudly, hypertension, obese,        tobacco use, Current use,                       Neurologic:  - neg neurologic ROS  (-) no seizures, no CVA and no TIA   Cardiovascular:     (+)  hypertension- -   -  - -                                      METS/Exercise Tolerance: >4 METS    Hematologic: Comments: Thrombocytopenia     (+)      anemia,          Musculoskeletal:       GI/Hepatic: Comment: Pain with defecation     (+) GERD (food related), Other,                  Renal/Genitourinary: Comment: Dysuria       Endo: Comment: Hypomagnesemia     (+)            Chronic steroid usage for  Date most recently used: patient takes decadron with chemotherapy. Obesity,       Psychiatric/Substance Use:  - neg psychiatric ROS    "  Infectious Disease:  - neg infectious disease ROS     Malignancy:   (+) Malignancy, History of Other.Other CA cervical cancer status post Chemo and Radiation.    Other:  - neg other ROS    (+)  , H/O Chronic Pain,         Physical Exam    Airway        Mallampati: II   TM distance: > 3 FB   Neck ROM: full   Mouth opening: > 3 cm    Respiratory Devices and Support         Dental       (+) Completely normal teeth      Cardiovascular          Rhythm and rate: regular and normal     Pulmonary           breath sounds clear to auscultation           OUTSIDE LABS:  CBC:   Lab Results   Component Value Date    WBC 2.4 (L) 05/28/2024    WBC 3.4 (L) 05/20/2024    HGB 9.7 (L) 05/28/2024    HGB 10.1 (L) 05/20/2024    HCT 28.8 (L) 05/28/2024    HCT 30.6 (L) 05/20/2024     (L) 05/28/2024     (L) 05/20/2024     BMP:   Lab Results   Component Value Date     (L) 05/28/2024     05/20/2024    POTASSIUM 3.9 05/28/2024    POTASSIUM 4.0 05/20/2024    CHLORIDE 92 (L) 05/28/2024    CHLORIDE 100 05/20/2024    CO2 24 05/28/2024    CO2 26 05/20/2024    BUN 15.8 05/28/2024    BUN 9.9 05/20/2024    CR 1.03 (H) 05/28/2024    CR 0.93 05/20/2024     (H) 05/28/2024     (H) 05/20/2024     COAGS: No results found for: \"PTT\", \"INR\", \"FIBR\"  POC: No results found for: \"BGM\", \"HCG\", \"HCGS\"  HEPATIC:   Lab Results   Component Value Date    ALBUMIN 3.8 05/28/2024    PROTTOTAL 7.2 05/28/2024    ALT 17 05/28/2024    AST 27 05/28/2024    ALKPHOS 105 05/28/2024    BILITOTAL 0.5 05/28/2024     OTHER:   Lab Results   Component Value Date    IVON 9.1 05/28/2024    MAG 1.2 (L) 05/20/2024    LIPASE 13 05/28/2024       Anesthesia Plan    ASA Status:  3    NPO Status:  NPO Appropriate    Anesthesia Type: MAC.     - Reason for MAC: immobility needed   Induction: Intravenous.   Maintenance: TIVA.        Consents    Anesthesia Plan(s) and associated risks, benefits, and realistic alternatives discussed. Questions answered and " patient/representative(s) expressed understanding.     - Discussed: Risks, Benefits and Alternatives for BOTH SEDATION and the PROCEDURE were discussed     - Discussed with:  Patient      - Extended Intubation/Ventilatory Support Discussed: No.      - Patient is DNR/DNI Status: No     Use of blood products discussed: No .     Postoperative Care    Pain management: IV analgesics.   PONV prophylaxis: Ondansetron (or other 5HT-3)     Comments:               Jb Hoyos DO    I have reviewed the pertinent notes and labs in the chart from the past 30 days and (re)examined the patient.  Any updates or changes from those notes are reflected in this note.     # Hyponatremia: Lowest Na = 130 mmol/L in last 30 days, will monitor as appropriate    # Hypomagnesemia: Lowest Mg = 1.2 mg/dL in last 30 days, will replace as needed      # Thrombocytopenia: Lowest platelets = 116 in last 30 days, will monitor for bleeding  # Obesity: Estimated body mass index is 38.08 kg/m  as calculated from the following:    Height as of 6/11/24: 1.524 m (5').    Weight as of 6/11/24: 88.5 kg (195 lb).

## 2024-06-14 NOTE — PROGRESS NOTES
A radiation therapy treatment planning simulation was performed.  HDR brachytherapy tandem and ring 5 of 5  Please see the [facility] record for documentation.    Dede Henry MD  Radiation Oncology

## 2024-06-14 NOTE — LETTER
6/14/2024      Michelle Mills  [address] [facility] [address]      Dear Colleague,    Thank you for referring your patient, Michelle Mills, to the [facility] RADIATION ONCOLOGY. Please see a copy of my visit note below.    Michelle Mills is here for scheduled HDR brachytherapy    HDR Tandem and Ring   5    Pre-procedure-  Time out done by Megan Valles RN and Dr Henry.   Patient identified, arm band applied, signed consent available   Medications taken by patient prior to procedure See OR notes  Pain assessment: 0/10  /82   Pulse 88     Post-procedure-  Pain assessment: 0/10   Device removed without difficulty, Education for possible side effects and management, Discharge teaching reviewed, questions answered, Vaginal dilator use reviewed, Follow-up scheduled, and Discharged to home       A radiation therapy treatment planning simulation was performed.  HDR brachytherapy tandem and ring 5 of 5  Please see the [facility] record for documentation.    Dede Henry MD  Radiation Oncology       Again, thank you for allowing me to participate in the care of your patient.        Sincerely,        Dede Henry MD

## 2024-06-14 NOTE — ANESTHESIA CARE TRANSFER NOTE
Patient: Michelle Mills    Procedure: Procedure(s):  ANESTHESIA OUT OF OR RAD THERAPY Tandem and Ring Procedure @1130       Diagnosis: Cervical cancer (H) [C53.9]  Diagnosis Additional Information: No value filed.    Anesthesia Type:   MAC     Note:    Oropharynx: spontaneously breathing  Level of Consciousness: awake  Oxygen Supplementation: room air    Independent Airway: airway patency satisfactory and stable  Dentition: dentition unchanged  Vital Signs Stable: post-procedure vital signs reviewed and stable  Report to RN Given: handoff report given  Destination: to mri with RN.          Vitals:  Vitals Value Taken Time   BP     Temp     Pulse     Resp     SpO2         Electronically Signed By: ANDREW Miguel CRNA  June 14, 2024  11:54 AM

## 2024-06-14 NOTE — PROGRESS NOTES
Michelle THOM Mills is here for scheduled HDR brachytherapy    HDR Tandem and Ring   5    Pre-procedure-  Time out done by Megan Valles RN and Dr Henry.   Patient identified, arm band applied, signed consent available   Medications taken by patient prior to procedure See OR notes  Pain assessment: 0/10  /82   Pulse 88     Post-procedure-  Pain assessment: 0/10   Device removed without difficulty, Education for possible side effects and management, Discharge teaching reviewed, questions answered, Vaginal dilator use reviewed, Follow-up scheduled, and Discharged to home

## 2024-06-19 NOTE — ANESTHESIA POSTPROCEDURE EVALUATION
Patient: Michelle Mills    Procedure: Procedure(s):  ANESTHESIA OUT OF OR RAD THERAPY Tandem and Ring Procedure @1130       Anesthesia Type:  MAC    Note:  Disposition: Outpatient   Postop Pain Control: Uneventful            Sign Out: Well controlled pain   PONV: No   Neuro/Psych: Uneventful            Sign Out: Acceptable/Baseline neuro status   Airway/Respiratory: Uneventful            Sign Out: Acceptable/Baseline resp. status   CV/Hemodynamics: Uneventful            Sign Out: Acceptable CV status; No obvious hypovolemia; No obvious fluid overload   Other NRE: NONE   DID A NON-ROUTINE EVENT OCCUR? No           Last vitals:  Vitals:    06/14/24 1110   BP: 123/78   Pulse: 98   Resp: 24       Electronically Signed By: Jb Hoyos DO  June 19, 2024  2:05 PM

## 2024-06-28 ENCOUNTER — ONCOLOGY VISIT (OUTPATIENT)
Dept: RADIATION ONCOLOGY | Facility: CLINIC | Age: 54
End: 2024-06-28
Payer: COMMERCIAL

## 2024-06-28 NOTE — LETTER
6/28/2024      Michelle Mills  [address]  [facility] [address]      Dear Colleague,    Thank you for referring your patient, Michelle Mills, to the [facility] RADIATION ONCOLOGY. Please see a copy of my visit note below.    No notes on file    Again, thank you for allowing me to participate in the care of your patient.        Sincerely,        Dede Henry MD

## 2024-07-05 ENCOUNTER — ONCOLOGY VISIT (OUTPATIENT)
Dept: ONCOLOGY | Facility: CLINIC | Age: 54
End: 2024-07-05
Attending: NURSE PRACTITIONER
Payer: COMMERCIAL

## 2024-07-05 DIAGNOSIS — Z91.199 NO-SHOW FOR APPOINTMENT: Primary | ICD-10-CM

## 2024-07-05 NOTE — LETTER
7/5/2024      Michelle Mills  [address] [facility] [address]      Dear Colleague,    Thank you for referring your patient, Michelle Mills, to the [facility] CANCER CLINIC. Please see a copy of my visit note below.    She did not show for her scheduled visit today.    ANDREW Connelly CNP       Again, thank you for allowing me to participate in the care of your patient.        Sincerely,        ANDREW Connelly CNP

## 2024-07-11 NOTE — PROCEDURES
Radiotherapy Treatment Summary          Date of Report: 2024     PATIENT: JOVANNY MILLS  MEDICAL RECORD NO: [id number]  : 1970     DIAGNOSIS: C53.8 Malignant neoplasm of overlapping sites of cervix uteri  INTENT OF RADIOTHERAPY: Cure  PATHOLOGY: squamous cell carcinoma of cervix  STAGE: FIGO IB3  CONCURRENT SYSTEMIC THERAPY: Cisplatin 40mg/m2     Details of the treatments summarized below are found in records kept in the Department of Radiation Oncology at [facility].  Treatment Summary:  Radiation Oncology - Course: 1:   Treatment Site           Current Dose       Modality From                      To                Elapsed Days Fx.  Pelvis                     4,500 cGy       10 X  4/22/2024  2024       36             25  Bilat parametria     5,200cGy  Cervix                    2,750 cGy        2024       11               5          Dose per Fraction:   Pelvis  180 cGy  Parametria 208 cGy  Cervix  550 cGy     COMMENTS:   Ms. Mills is a 52yo woman with squamous cell carcinoma of the cervix, who initially presented with abnormal   pap. Cervical biopsy showed SCC. MRI and PET showed no parametrial, vaginal or denilson involvement or   distant metastases. Chemoradiation was recommended for local control.      The pelvis including the cervix received 45Gy in 25 daily fractions with 10MV photons using VMAT   technique. 2 coplanar arcs were used. The bilateral parametria received higher daily dose with simultaneous   integrated boost technique to a dose of 52Gy.  The HRCTV including the cervix and gross residual disease   received a boost of 27.5Gy with tandem and ring brachytherapy using Iridium-192 source.     During treatment, she developed diarrhea for which she used Imodium. She developed pain with defecation, for   which hemorrhoid cream and PO analgesics were recommended. Cavilon was recommended for skin   protection. She tolerated brachytherapy boost without issues.     ED  visits/hospitalizations: 5-28 for body aches  Missed treatments: 5-14-24 and [facility]  Acute Toxicity Profile by CTC v5.0: fatigue, grade 1; pain, grade 2; diarrhea, grade 1; frequency, grade 1;   dysuria, grade 1; dermatitis, grade 1     PAIN MANAGEMENT: PO analgesics     FOLLOW UP PLAN: See Dr. Edward on 9-19-24     Resident Physician:  Linda Espino M.D.  Staff Physician: Dede Henry M.D.     CC:  Katiuska Edward MD                                  Radiation Oncology:  [facility] [address], [address] [place] [id number]

## 2024-09-04 ENCOUNTER — PATIENT OUTREACH (OUTPATIENT)
Dept: ONCOLOGY | Facility: CLINIC | Age: 54
End: 2024-09-04
Payer: COMMERCIAL

## 2024-09-04 NOTE — PROGRESS NOTES
RN received the following:  Michelle was approved for her PET scan. We had to withdraw the case and start over because the approval date  before the actual scan date. NOW it is denied doctor can initiate a P2P.     If you would like to set that up, please use the [phone]     Case id# [id number]     RN reached out to the number above and spoke with Dyana     There was no documentation on why this was approved the first time and denied the second time. It was confirmed that things were done correctly on our end in regard to withdrawing and re-requesting     Policy says there are only 10 days to accomplish p2p so new case had to be started     All needed information reviewed and PET scan approved     Approval number  [id number]    Approval Date  -2024    PA team updated     Xiao Boateng RN

## 2024-09-13 ENCOUNTER — ANCILLARY PROCEDURE (OUTPATIENT)
Dept: PET IMAGING | Facility: CLINIC | Age: 54
End: 2024-09-13
Attending: OBSTETRICS & GYNECOLOGY
Payer: COMMERCIAL

## 2024-09-13 DIAGNOSIS — C53.1 MALIGNANT NEOPLASM OF EXOCERVIX (H): ICD-10-CM

## 2024-09-13 LAB
CREAT BLD-MCNC: 1.3 MG/DL (ref 0.5–1.2)
GFR SERPL CREATININE-BSD FRML MDRD: 49 ML/MIN/{1.73_M2}
GLUCOSE SERPL-MCNC: 107 MG/DL (ref 70–99)

## 2024-09-13 RX ORDER — IOPAMIDOL 755 MG/ML
50-125 INJECTION, SOLUTION INTRAVASCULAR ONCE
Status: COMPLETED | OUTPATIENT
Start: 2024-09-13 | End: 2024-09-13

## 2024-09-13 RX ORDER — FUROSEMIDE 10 MG/ML
40 INJECTION INTRAMUSCULAR; INTRAVENOUS ONCE
Status: COMPLETED | OUTPATIENT
Start: 2024-09-13 | End: 2024-09-13

## 2024-09-13 RX ORDER — FLUDEOXYGLUCOSE F 18 200 MCI/ML
8-18 INJECTION, SOLUTION INTRAVENOUS ONCE
Status: COMPLETED | OUTPATIENT
Start: 2024-09-13 | End: 2024-09-13

## 2024-09-13 RX ADMIN — FUROSEMIDE 40 MG: 10 INJECTION INTRAMUSCULAR; INTRAVENOUS at 08:08

## 2024-09-13 RX ADMIN — IOPAMIDOL 105 ML: 755 INJECTION, SOLUTION INTRAVASCULAR at 08:08

## 2024-09-13 RX ADMIN — FLUDEOXYGLUCOSE F 18 13.76 MILLICURIE: 200 INJECTION, SOLUTION INTRAVENOUS at 07:06

## 2024-09-13 NOTE — DISCHARGE INSTRUCTIONS

## 2024-09-19 ENCOUNTER — ONCOLOGY VISIT (OUTPATIENT)
Dept: ONCOLOGY | Facility: CLINIC | Age: 54
End: 2024-09-19
Attending: OBSTETRICS & GYNECOLOGY
Payer: COMMERCIAL

## 2024-09-19 VITALS
DIASTOLIC BLOOD PRESSURE: 89 MMHG | OXYGEN SATURATION: 98 % | HEART RATE: 88 BPM | TEMPERATURE: 98.2 F | RESPIRATION RATE: 12 BRPM | SYSTOLIC BLOOD PRESSURE: 156 MMHG | BODY MASS INDEX: 38.14 KG/M2 | WEIGHT: 195.3 LBS

## 2024-09-19 DIAGNOSIS — C53.1 MALIGNANT NEOPLASM OF EXOCERVIX (H): Primary | ICD-10-CM

## 2024-09-19 PROCEDURE — 99213 OFFICE O/P EST LOW 20 MIN: CPT | Performed by: OBSTETRICS & GYNECOLOGY

## 2024-09-19 ASSESSMENT — PAIN SCALES - GENERAL: PAINLEVEL: SEVERE PAIN (6)

## 2024-09-19 NOTE — NURSING NOTE
Oncology Rooming Note    September 19, 2024 4:03 PM   Michelle Mills is a 54 year old female who presents for:    Chief Complaint   Patient presents with    Oncology Clinic Visit     Malignant neoplasm of exocervix     Initial Vitals: BP (!) 176/111 (BP Location: Right arm, Patient Position: Sitting, Cuff Size: Adult Large)   Pulse 88   Temp 98.2  F (36.8  C) (Oral)   Resp 12   Wt 88.6 kg (195 lb 4.8 oz)   SpO2 98%   BMI 38.14 kg/m   Estimated body mass index is 38.14 kg/m  as calculated from the following:    Height as of 6/11/24: 1.524 m (5').    Weight as of this encounter: 88.6 kg (195 lb 4.8 oz). Body surface area is 1.94 meters squared.  Severe Pain (6) Comment: Data Unavailable   No LMP recorded. Patient is postmenopausal.  Allergies reviewed: Yes  Medications reviewed: Yes    Medications: Medication refills not needed today.  Pharmacy name entered into [facility]:    [facility] [place], [place] - [address] [place] [address].  [facility] [place], [place] - [address] [facility]    Frailty Screening:   Is the patient here for a new oncology consult visit in cancer care? 2. No      Clinical concerns: none      Domonique Gonzalez

## 2024-09-19 NOTE — LETTER
2024      Michelle Mills  [address]  [facility] [address]      Dear Colleague,    Thank you for referring your patient, Michelle Mills, to the [facility] CANCER CLINIC. Please see a copy of my visit note below.    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      ONCOLOGY HISTORY:  24 noted a friable cervix  Pap: ASC-US, hrHPV 16 positive     24 Pelvic US - endometrial stripe 9 mm     3/1/24 Colposcopy - anterior cervix enlarged, cx biopsy taken and endometrial biopsy  Pathology cervix at 1 o'clock squamous cell carcinoma extending to resection margin  ECC:fragments of squamous cell carcinoma, papillary variant  EMBx: special fragment of squamous cell carcinoma     3/22/24 PET CT  CERVIX 7.4 X 4.6 cm, SUV 19.8, right common vivian 1.3 cm x 1.1 cm, low level SUV 3.1 cm,     3/28/24 MRI Pelvic  Large mass confined to the cervix, without extension to parametrial or lower vagina    24 to 24 EBRT 4,500 cGy with cisplatin 40mg/m2 IV weekly  6/3 and  HDR Brachytherapy 5 doses    24 PET CT scan  1. Complete resolution of hypermetabolism within the cervix.  2. No evidence of denilson or metastatic disease.        Interval History  Reports radiation was difficult, but is feeling better. She is not using vaginal dilator, notes some vaginal discharge    Past Medical History:   Diagnosis Date     Anemia      HTN (hypertension)      Hypomagnesemia      Malignant neoplasm of overlapping sites of cervix (H)      Thrombocytopenia (H24)      Tobacco abuse       Past Surgical History:   Procedure Laterality Date     EXAM UNDER ANESTHESIA, INSERT KENDY SLEEVE, UTERINE PLACEMENT OF TANDEM AND RING FOR RAD, ULTRASOUND N/A 2024    Procedure: EXAM UNDER ANESTHESIA, GYNECOLOGIC,  WITH INSERTION OF KENDY SLEEVE, WITH ULTRASOUND GUIDANCE;  Surgeon: Dede Henry MD;  Location: [facility] OR     Social History     Socioeconomic History     Marital status: Single   Tobacco Use     Smoking status: Every Day     Types: Cigarettes     Passive exposure: Current     Smokeless tobacco: Never      Family History   Problem Relation Age of Onset     Anesthesia Reaction Granddaughter         difficulty coming out of anesthesia, PONV, slow to wake     Malignant Hyperthermia No family hx of      Venous thrombosis No family hx of       Current Outpatient Medications   Medication Sig Dispense Refill     acetaminophen (TYLENOL) 500 MG tablet Take 500-1,000 mg by mouth every 6 hours as needed for mild pain       chlorthalidone (HYGROTON) 25 MG tablet Take 1 tablet (25 mg) by mouth daily (Patient taking differently: Take 25 mg by mouth every morning.) 60 tablet 0     dexAMETHasone (DECADRON) 4 MG tablet Take 2 tablets (8 mg) by mouth daily Take for 3 days, starting the day after chemo on day 2 and 9. Take with food. If no nausea and vomiting with first cisplatin doses, may stop dexamethasone. (Patient not taking: Reported on 9/19/2024) 12 tablet 2     hydrocortisone (ANUSOL-HC) 25 MG suppository Place 1 suppository (25 mg) rectally 2 times daily (Patient not taking: Reported on 9/19/2024) 30 suppository 4     hydrocortisone-pramoxine (ANALPRAM-HC) rectal cream Place rectally 2 times daily (Patient not taking: Reported on 9/19/2024) 30 g 1     ibuprofen (ADVIL/MOTRIN) 200 MG capsule Take 1-2 capsules (200-400 mg) by mouth every 6 hours as needed for moderate pain, fever or inflammatory pain (Patient not taking: Reported on 9/19/2024) 30 capsule 0     loperamide (IMODIUM) 2 MG capsule Take 2 mg by mouth 4 times daily as needed for diarrhea (Patient not taking: Reported on 9/19/2024)       multivitamin, therapeutic (THERA-VIT) TABS tablet Take 1 tablet by mouth daily (Patient not taking: Reported on 9/19/2024)        ondansetron (ZOFRAN) 8 MG tablet Take 1 tablet (8 mg) by mouth every 8 hours as needed for nausea (vomiting) (Patient not taking: Reported on 9/19/2024) 30 tablet 2     oxyCODONE (ROXICODONE) 5 MG tablet Take 1 tablet (5 mg) by mouth every 8 hours as needed for severe pain (Patient not taking: Reported on 9/19/2024) 10 tablet 0     prochlorperazine (COMPAZINE) 10 MG tablet Take 1 tablet (10 mg) by mouth every 6 hours as needed for nausea or vomiting (Patient not taking: Reported on 9/19/2024) 30 tablet 2     senna-docusate (SENOKOT-S/PERICOLACE) 8.6-50 MG tablet Take 1-2 tablets by mouth 2 times daily as needed for constipation (Patient not taking: Reported on 9/19/2024) 30 tablet 0     vitamin D2 (ERGOCALCIFEROL) 09501 units (1250 mcg) capsule  (Patient not taking: Reported on 9/19/2024)       witch hazel-glycerin (TUCKS) pad Apply topically as needed for hemorrhoids (Patient not taking: Reported on 9/19/2024) 50 each 1     No current facility-administered medications for this visit.      Allergies   Allergen Reactions     Codeine      Other Reaction(s): Unknown          PHYSICAL EXAM:  BP (!) 156/89 (BP Location: Left arm, Patient Position: Sitting, Cuff Size: Adult Large)   Pulse 88   Temp 98.2  F (36.8  C) (Oral)   Resp 12   Wt 88.6 kg (195 lb 4.8 oz)   SpO2 98%   BMI 38.14 kg/m        Constitutional: appears healthy and in no acute distress.  Neurological: alert and oriented x 3. normal gait, no tremor.  Psychological: appropriate mood and affect.  Gastrointestinal: soft, nontender, no hepatosplenomegaly, no masses.    Genitourinary: normal external genitalia and urethral meatus. Vagina smooth without nodularity or masses. Radiation changes along upper vagina, no adhesions, cervix is small, no nodularity  Bimanual exam reveals normal sized uterus and no apparent adnexal masses.   Musculoskeletal: extremities are non-tender and without edema.  Lymphatic: no inguinal lymphadenopathy.         ASSESSMENT:  Stage IB3 squamous cell carcinoma of the cervix, completed primary chemoradiation therapy on 6/14/24.    Post treatment imaging shows complete disease response      DISEASE:   Cervical Cancer- discussed plan for cancer surveillance visit, next visit in 3 months with NP clinic. She is aware of plan for every 3 months visit for next 2 years and then to every 6 months to complete 5 year follow up.    She agrees to using the vaginal dilator 2-3 per week  Follow up with PCP for routine health care        Katiuska Edward M.D., MPH,  F.A.C.O.G.  Division of Gynecologic Oncology  [facility]  [phone]    Time: total time spent today, 25 , including preparation, review of outside records, face to face counseling, and documentation.              Again, thank you for allowing me to participate in the care of your patient.        Sincerely,        Katiuska Edward MD

## 2024-09-19 NOTE — PROGRESS NOTES
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      ONCOLOGY HISTORY:  24 noted a friable cervix  Pap: ASC-US, hrHPV 16 positive     24 Pelvic US - endometrial stripe 9 mm     3/1/24 Colposcopy - anterior cervix enlarged, cx biopsy taken and endometrial biopsy  Pathology cervix at 1 o'clock squamous cell carcinoma extending to resection margin  ECC:fragments of squamous cell carcinoma, papillary variant  EMBx: special fragment of squamous cell carcinoma     3/22/24 PET CT  CERVIX 7.4 X 4.6 cm, SUV 19.8, right common vivian 1.3 cm x 1.1 cm, low level SUV 3.1 cm,     3/28/24 MRI Pelvic  Large mass confined to the cervix, without extension to parametrial or lower vagina    24 to 24 EBRT 4,500 cGy with cisplatin 40mg/m2 IV weekly  6/3 and  HDR Brachytherapy 5 doses    24 PET CT scan  1. Complete resolution of hypermetabolism within the cervix.  2. No evidence of denilson or metastatic disease.        Interval History  Reports radiation was difficult, but is feeling better. She is not using vaginal dilator, notes some vaginal discharge    Past Medical History:   Diagnosis Date    Anemia     HTN (hypertension)     Hypomagnesemia     Malignant neoplasm of overlapping sites of cervix (H)     Thrombocytopenia (H24)     Tobacco abuse       Past Surgical History:   Procedure Laterality Date    EXAM UNDER ANESTHESIA, INSERT KENDY SLEEVE, UTERINE PLACEMENT OF TANDEM AND RING FOR RAD, ULTRASOUND N/A 2024    Procedure: EXAM UNDER ANESTHESIA, GYNECOLOGIC, WITH INSERTION OF KENDY SLEEVE, WITH ULTRASOUND GUIDANCE;  Surgeon: Dede Henry MD;  Location:  OR     Social History     Socioeconomic History    Marital status: Single   Tobacco Use    Smoking status: Every Day     Types: Cigarettes      Passive exposure: Current    Smokeless tobacco: Never      Family History   Problem Relation Age of Onset    Anesthesia Reaction Granddaughter         difficulty coming out of anesthesia, PONV, slow to wake    Malignant Hyperthermia No family hx of     Venous thrombosis No family hx of       Current Outpatient Medications   Medication Sig Dispense Refill    acetaminophen (TYLENOL) 500 MG tablet Take 500-1,000 mg by mouth every 6 hours as needed for mild pain      chlorthalidone (HYGROTON) 25 MG tablet Take 1 tablet (25 mg) by mouth daily (Patient taking differently: Take 25 mg by mouth every morning.) 60 tablet 0    dexAMETHasone (DECADRON) 4 MG tablet Take 2 tablets (8 mg) by mouth daily Take for 3 days, starting the day after chemo on day 2 and 9. Take with food. If no nausea and vomiting with first cisplatin doses, may stop dexamethasone. (Patient not taking: Reported on 9/19/2024) 12 tablet 2    hydrocortisone (ANUSOL-HC) 25 MG suppository Place 1 suppository (25 mg) rectally 2 times daily (Patient not taking: Reported on 9/19/2024) 30 suppository 4    hydrocortisone-pramoxine (ANALPRAM-HC) rectal cream Place rectally 2 times daily (Patient not taking: Reported on 9/19/2024) 30 g 1    ibuprofen (ADVIL/MOTRIN) 200 MG capsule Take 1-2 capsules (200-400 mg) by mouth every 6 hours as needed for moderate pain, fever or inflammatory pain (Patient not taking: Reported on 9/19/2024) 30 capsule 0    loperamide (IMODIUM) 2 MG capsule Take 2 mg by mouth 4 times daily as needed for diarrhea (Patient not taking: Reported on 9/19/2024)      multivitamin, therapeutic (THERA-VIT) TABS tablet Take 1 tablet by mouth daily (Patient not taking: Reported on 9/19/2024)      ondansetron (ZOFRAN) 8 MG tablet Take 1 tablet (8 mg) by mouth every 8 hours as needed for nausea (vomiting) (Patient not taking: Reported on 9/19/2024) 30 tablet 2    oxyCODONE (ROXICODONE) 5 MG tablet Take 1 tablet (5 mg) by mouth every 8 hours as needed for  severe pain (Patient not taking: Reported on 9/19/2024) 10 tablet 0    prochlorperazine (COMPAZINE) 10 MG tablet Take 1 tablet (10 mg) by mouth every 6 hours as needed for nausea or vomiting (Patient not taking: Reported on 9/19/2024) 30 tablet 2    senna-docusate (SENOKOT-S/PERICOLACE) 8.6-50 MG tablet Take 1-2 tablets by mouth 2 times daily as needed for constipation (Patient not taking: Reported on 9/19/2024) 30 tablet 0    vitamin D2 (ERGOCALCIFEROL) 83880 units (1250 mcg) capsule  (Patient not taking: Reported on 9/19/2024)      witch hazel-glycerin (TUCKS) pad Apply topically as needed for hemorrhoids (Patient not taking: Reported on 9/19/2024) 50 each 1     No current facility-administered medications for this visit.      Allergies   Allergen Reactions    Codeine      Other Reaction(s): Unknown          PHYSICAL EXAM:  BP (!) 156/89 (BP Location: Left arm, Patient Position: Sitting, Cuff Size: Adult Large)   Pulse 88   Temp 98.2  F (36.8  C) (Oral)   Resp 12   Wt 88.6 kg (195 lb 4.8 oz)   SpO2 98%   BMI 38.14 kg/m        Constitutional: appears healthy and in no acute distress.  Neurological: alert and oriented x 3. normal gait, no tremor.  Psychological: appropriate mood and affect.  Gastrointestinal: soft, nontender, no hepatosplenomegaly, no masses.    Genitourinary: normal external genitalia and urethral meatus. Vagina smooth without nodularity or masses. Radiation changes along upper vagina, no adhesions, cervix is small, no nodularity  Bimanual exam reveals normal sized uterus and no apparent adnexal masses.   Musculoskeletal: extremities are non-tender and without edema.  Lymphatic: no inguinal lymphadenopathy.         ASSESSMENT: Stage IB3 squamous cell carcinoma of the cervix, completed primary chemoradiation therapy on 6/14/24.    Post treatment imaging shows complete disease response      DISEASE:   Cervical Cancer- discussed plan for cancer surveillance visit, next visit in 3 months with NP  clinic. She is aware of plan for every 3 months visit for next 2 years and then to every 6 months to complete 5 year follow up.    She agrees to using the vaginal dilator 2-3 per week  Follow up with PCP for routine health care        Katiuska Edward M.D., MPH,  F.A.C.O.G.  Division of Gynecologic Oncology  [facility]  [phone]    Time: total time spent today, 25 , including preparation, review of outside records, face to face counseling, and documentation.

## 2024-12-18 NOTE — PROGRESS NOTES
She did not show for her scheduled visit today.  ANDREW Connelly CNP       Oncology History:  1/26/24 noted a friable cervix  Pap: ASC-US, hrHPV 16 positive     2/12/24 Pelvic US - endometrial stripe 9 mm     3/1/24 Colposcopy - anterior cervix enlarged, cx biopsy taken and endometrial biopsy  Pathology cervix at 1 o'clock squamous cell carcinoma extending to resection margin  ECC:fragments of squamous cell carcinoma, papillary variant  EMBx: special fragment of squamous cell carcinoma     3/22/24 PET CT  CERVIX 7.4 X 4.6 cm, SUV 19.8, right common vivian 1.3 cm x 1.1 cm, low level SUV 3.1 cm,     3/28/24 MRI Pelvic  Large mass confined to the cervix, without extension to parametrial or lower vagina     4/23/24 to 5/28/24 EBRT 4,500 cGy with cisplatin 40mg/m2 IV weekly  6/3/24 and 6/14/24 HDR Brachytherapy 5 doses     9/13/24 PET CT scan  1. Complete resolution of hypermetabolism within the cervix.  2. No evidence of denilson or metastatic disease.

## 2024-12-19 ENCOUNTER — ONCOLOGY VISIT (OUTPATIENT)
Dept: ONCOLOGY | Facility: CLINIC | Age: 54
End: 2024-12-19
Attending: NURSE PRACTITIONER
Payer: COMMERCIAL

## 2024-12-19 DIAGNOSIS — Z91.199 NO-SHOW FOR APPOINTMENT: Primary | ICD-10-CM

## (undated) DEVICE — SUCTION MANIFOLD NEPTUNE 2 SYS 4 PORT 0702-020-000

## (undated) DEVICE — LINEN TOWEL PACK X5 5464

## (undated) DEVICE — SYR 30ML SLIP TIP W/O NDL 302833

## (undated) DEVICE — DRAPE SHEET REV FOLD 3/4 9349

## (undated) DEVICE — SPONGE RAY-TEC 4X8" 7318

## (undated) DEVICE — TUBING SUCTION 10'X3/16" N510

## (undated) DEVICE — PACK SET-UP STD 9102

## (undated) DEVICE — LIGHT HANDLE X1 31140133

## (undated) DEVICE — NDL COUNTER 20CT 31142493

## (undated) DEVICE — LABEL MEDICATION SYSTEM 3303-P

## (undated) DEVICE — JELLY LUBRICATING SURGILUBE 2OZ TUBE

## (undated) DEVICE — CATH FOLEY 14FR 5ML SILVER COAT LATEX 0165SI14

## (undated) DEVICE — DRAPE LEGGINGS CLEAR 8430

## (undated) DEVICE — PAD CHUX UNDERPAD 23X24" 7136

## (undated) DEVICE — MINOR SINGLE BASIN KIT CSR WRAPX2 7QT SSK3002

## (undated) DEVICE — PREP SKIN SCRUB TRAY 4461A

## (undated) DEVICE — SUCTION TIP YANKAUER STR K87

## (undated) DEVICE — SOL WATER IRRIG 1000ML BOTTLE 2F7114

## (undated) DEVICE — SYR PISTON URETHRAL 60ML 68000

## (undated) DEVICE — GOWN XLG DISP 9545

## (undated) DEVICE — GLOVE SENSICARE 7.0 MSG1070 LATEX FREE

## (undated) DEVICE — GLOVE BIOGEL PI MICRO SZ 6.5 48565

## (undated) DEVICE — CATH FOLEY 24FR 5ML SILICONE LUBRI-SIL 175824

## (undated) DEVICE — PREP DYNA-HEX 4% CHG SCRUB 4OZ BOTTLE MDS098710

## (undated) DEVICE — BASIN EMESIS STERILE  SSK9005A

## (undated) RX ORDER — DEXAMETHASONE SODIUM PHOSPHATE 4 MG/ML
INJECTION, SOLUTION INTRA-ARTICULAR; INTRALESIONAL; INTRAMUSCULAR; INTRAVENOUS; SOFT TISSUE
Status: DISPENSED
Start: 2024-05-21

## (undated) RX ORDER — PROPOFOL 10 MG/ML
INJECTION, EMULSION INTRAVENOUS
Status: DISPENSED
Start: 2024-05-21

## (undated) RX ORDER — LIDOCAINE HYDROCHLORIDE 20 MG/ML
JELLY TOPICAL
Status: DISPENSED
Start: 2024-05-21

## (undated) RX ORDER — ONDANSETRON 2 MG/ML
INJECTION INTRAMUSCULAR; INTRAVENOUS
Status: DISPENSED
Start: 2024-05-21

## (undated) RX ORDER — HYDROMORPHONE HYDROCHLORIDE 1 MG/ML
INJECTION, SOLUTION INTRAMUSCULAR; INTRAVENOUS; SUBCUTANEOUS
Status: DISPENSED
Start: 2024-06-07

## (undated) RX ORDER — ESMOLOL HYDROCHLORIDE 10 MG/ML
INJECTION INTRAVENOUS
Status: DISPENSED
Start: 2024-06-11

## (undated) RX ORDER — PROPOFOL 10 MG/ML
INJECTION, EMULSION INTRAVENOUS
Status: DISPENSED
Start: 2024-06-14

## (undated) RX ORDER — KETOROLAC TROMETHAMINE 30 MG/ML
INJECTION, SOLUTION INTRAMUSCULAR; INTRAVENOUS
Status: DISPENSED
Start: 2024-05-21

## (undated) RX ORDER — ONDANSETRON 2 MG/ML
INJECTION INTRAMUSCULAR; INTRAVENOUS
Status: DISPENSED
Start: 2024-06-11

## (undated) RX ORDER — HYDROMORPHONE HYDROCHLORIDE 1 MG/ML
INJECTION, SOLUTION INTRAMUSCULAR; INTRAVENOUS; SUBCUTANEOUS
Status: DISPENSED
Start: 2024-06-11

## (undated) RX ORDER — PROPOFOL 10 MG/ML
INJECTION, EMULSION INTRAVENOUS
Status: DISPENSED
Start: 2024-06-11

## (undated) RX ORDER — ESTRADIOL 0.1 MG/G
CREAM VAGINAL
Status: DISPENSED
Start: 2024-05-21

## (undated) RX ORDER — PROPOFOL 10 MG/ML
INJECTION, EMULSION INTRAVENOUS
Status: DISPENSED
Start: 2024-06-05

## (undated) RX ORDER — KETOROLAC TROMETHAMINE 30 MG/ML
INJECTION, SOLUTION INTRAMUSCULAR; INTRAVENOUS
Status: DISPENSED
Start: 2024-06-11

## (undated) RX ORDER — FENTANYL CITRATE 50 UG/ML
INJECTION, SOLUTION INTRAMUSCULAR; INTRAVENOUS
Status: DISPENSED
Start: 2024-06-03

## (undated) RX ORDER — PROPOFOL 10 MG/ML
INJECTION, EMULSION INTRAVENOUS
Status: DISPENSED
Start: 2024-06-03

## (undated) RX ORDER — HYDROMORPHONE HYDROCHLORIDE 1 MG/ML
INJECTION, SOLUTION INTRAMUSCULAR; INTRAVENOUS; SUBCUTANEOUS
Status: DISPENSED
Start: 2024-06-05

## (undated) RX ORDER — HYDROMORPHONE HYDROCHLORIDE 1 MG/ML
INJECTION, SOLUTION INTRAMUSCULAR; INTRAVENOUS; SUBCUTANEOUS
Status: DISPENSED
Start: 2024-06-14

## (undated) RX ORDER — KETOROLAC TROMETHAMINE 30 MG/ML
INJECTION, SOLUTION INTRAMUSCULAR; INTRAVENOUS
Status: DISPENSED
Start: 2024-06-03

## (undated) RX ORDER — FENTANYL CITRATE-0.9 % NACL/PF 10 MCG/ML
PLASTIC BAG, INJECTION (ML) INTRAVENOUS
Status: DISPENSED
Start: 2024-06-11

## (undated) RX ORDER — ONDANSETRON 2 MG/ML
INJECTION INTRAMUSCULAR; INTRAVENOUS
Status: DISPENSED
Start: 2024-06-03

## (undated) RX ORDER — ONDANSETRON 2 MG/ML
INJECTION INTRAMUSCULAR; INTRAVENOUS
Status: DISPENSED
Start: 2024-06-14

## (undated) RX ORDER — FENTANYL CITRATE 50 UG/ML
INJECTION, SOLUTION INTRAMUSCULAR; INTRAVENOUS
Status: DISPENSED
Start: 2024-05-21